# Patient Record
Sex: MALE | Race: OTHER | Employment: PART TIME | URBAN - METROPOLITAN AREA
[De-identification: names, ages, dates, MRNs, and addresses within clinical notes are randomized per-mention and may not be internally consistent; named-entity substitution may affect disease eponyms.]

---

## 2018-02-15 ENCOUNTER — OFFICE VISIT (OUTPATIENT)
Dept: FAMILY MEDICINE CLINIC | Age: 48
End: 2018-02-15

## 2018-02-15 VITALS
BODY MASS INDEX: 25.06 KG/M2 | DIASTOLIC BLOOD PRESSURE: 76 MMHG | TEMPERATURE: 98.2 F | RESPIRATION RATE: 20 BRPM | HEIGHT: 71 IN | SYSTOLIC BLOOD PRESSURE: 102 MMHG | WEIGHT: 179 LBS | HEART RATE: 86 BPM | OXYGEN SATURATION: 96 %

## 2018-02-15 DIAGNOSIS — R09.81 NASAL CONGESTION: ICD-10-CM

## 2018-02-15 DIAGNOSIS — F33.9 RECURRENT MAJOR DEPRESSIVE DISORDER, REMISSION STATUS UNSPECIFIED (HCC): ICD-10-CM

## 2018-02-15 DIAGNOSIS — J06.9 URTI (ACUTE UPPER RESPIRATORY INFECTION): Primary | ICD-10-CM

## 2018-02-15 DIAGNOSIS — R05.9 COUGH: ICD-10-CM

## 2018-02-15 RX ORDER — DIPHENHYDRAMINE HCL 25 MG
25 CAPSULE ORAL DAILY
COMMUNITY
End: 2018-04-12

## 2018-02-15 RX ORDER — BISMUTH SUBSALICYLATE 262 MG
1 TABLET,CHEWABLE ORAL DAILY
COMMUNITY
End: 2018-12-05 | Stop reason: SDUPTHER

## 2018-02-15 RX ORDER — ARIPIPRAZOLE 10 MG/1
TABLET ORAL
Refills: 5 | COMMUNITY
Start: 2018-01-23 | End: 2020-09-10

## 2018-02-15 RX ORDER — TEMAZEPAM 30 MG/1
15 CAPSULE ORAL
Refills: 5 | COMMUNITY
Start: 2017-12-20 | End: 2020-09-10 | Stop reason: SDUPTHER

## 2018-02-15 RX ORDER — GUAIFENESIN 600 MG/1
600 TABLET, EXTENDED RELEASE ORAL 2 TIMES DAILY
Qty: 20 TAB | Refills: 0 | Status: SHIPPED | OUTPATIENT
Start: 2018-02-15 | End: 2018-06-13

## 2018-02-15 RX ORDER — FLUOXETINE HYDROCHLORIDE 20 MG/1
CAPSULE ORAL
Refills: 5 | COMMUNITY
Start: 2018-01-23

## 2018-02-15 NOTE — PROGRESS NOTES
Marita Brochure     Chief Complaint   Patient presents with   [facility] Establish Care    Cold Symptoms     Vitals:    02/15/18 1552   BP: 102/76   Pulse: 86   Resp: 20   Temp: 98.2 °F (36.8 °C)   TempSrc: Oral   SpO2: 96%   Weight: 179 lb (81.2 kg)   Height: 5' 11\" (1.803 m)   PainSc:   0 - No pain         HPI:***    Past Medical History:   Diagnosis Date    Brain anoxic injury ([address]     Depression 02/01/2005    recurrent episode, Major Depression    Psychotic disorder 12/07/2007    Intellectual disability      Past Surgical History:   Procedure Laterality Date    HX TRACHEOSTOMY N/A 1985     Social History   Substance Use Topics    Smoking status: Never Smoker    Smokeless tobacco: Never Used    Alcohol use Yes       History reviewed. No pertinent family history. Review of Systems   Constitutional: Negative for chills, fever, malaise/fatigue and weight loss. HENT: Positive for congestion. Negative for ear discharge, ear pain, hearing loss, nosebleeds, sinus pain and sore throat. Eyes: Negative for blurred vision, double vision and discharge. Respiratory: Positive for cough and sputum production. Negative for hemoptysis, shortness of breath and wheezing. Cardiovascular: Negative for chest pain, palpitations, claudication and leg swelling. Gastrointestinal: Negative for abdominal pain, constipation, diarrhea, nausea and vomiting. Genitourinary: Negative for dysuria, frequency and urgency. Musculoskeletal: Negative for back pain, falls, joint pain, myalgias and neck pain. Skin: Negative for itching and rash. Neurological: Negative for dizziness, tingling, sensory change, speech change, focal weakness, weakness and headaches. Psychiatric/Behavioral: Positive for depression. Negative for hallucinations, substance abuse and suicidal ideas. The patient is nervous/anxious. The patient does not have insomnia. Physical Exam   Constitutional: He is oriented to person, place, and time.  He appears well-developed and well-nourished. No distress. HENT:   Head: Normocephalic and atraumatic. Mouth/Throat: No oropharyngeal exudate. Eyes: Conjunctivae are normal. Pupils are equal, round, and reactive to light. Right eye exhibits no discharge. Left eye exhibits no discharge. No scleral icterus. Neck: Normal range of motion. Neck supple. No thyromegaly present. Cardiovascular: Normal rate, regular rhythm and normal heart sounds. Pulmonary/Chest: Effort normal and breath sounds normal. No respiratory distress. He has no rales. Abdominal: Soft. Bowel sounds are normal. He exhibits no distension and no mass. There is no tenderness. There is no rebound. Musculoskeletal: Normal range of motion. He exhibits no edema, tenderness or deformity. Lymphadenopathy:     He has no cervical adenopathy. Neurological: He is alert and oriented to person, place, and time. No cranial nerve deficit. Coordination normal.   Skin: Skin is warm and dry. No rash noted. He is not diaphoretic. No erythema. Psychiatric: He has a normal mood and affect. Judgment and thought content normal.        Assessment and plan       Current Outpatient Prescriptions   Medication Sig Dispense Refill    temazepam (RESTORIL) 30 mg capsule TK ONE C PO QHS PRN  5    FLUoxetine (PROZAC) 20 mg capsule TK 1 C PO QD  5    ARIPiprazole (ABILIFY) 10 mg tablet TK 1 T PO QAM  5    diphenhydrAMINE (BENADRYL) 25 mg capsule Take 25 mg by mouth daily. Indications: Allergic Conjunctivitis      multivitamin (ONE A DAY) tablet Take 1 Tab by mouth daily. There are no active problems to display for this patient. No results found for this or any previous visit. No results found for any previous visit.        Follow-up Disposition: Not on File

## 2018-02-15 NOTE — PROGRESS NOTES
Shameka Skelton     Chief Complaint   Patient presents with    [facility] Care    Cold Symptoms     Vitals:    02/15/18 1552   BP: 102/76   Pulse: 86   Resp: 20   Temp: 98.2 °F (36.8 °C)   TempSrc: Oral   SpO2: 96%   Weight: 179 lb (81.2 kg)   Height: 5' 11\" (1.803 m)   PainSc:   0 - No pain         HPI: Is coming for the first time to complain about cough and cold for the day, no fever no chills he has some nasal congestion which she is using nasal spray for, no shortness of breath no chest pain no nausea no vomiting no diarrhea no abdominal pain. He does have anxiety and depression and occasionally depressed and anxious according to his caregiver. He did have a  Traumatic  Brain injury over 30 years ago resulted in slurred speech and affect is also his memory and balance  Past Medical History:   Diagnosis Date    [address] an[facility] ([facility]     Depression 02/01/2005    recurrent episode, Major Depression    Psychotic disorder 12/07/2007    Intellectual disability      Past Surgical History:   Procedure Laterality Date    HX TRACHEOSTOMY N/A 1985     Social History   Substance Use Topics    Smoking status: Never Smoker    Smokeless tobacco: Never Used    Alcohol use Yes       History reviewed. No pertinent family history. Review of Systems   Constitutional: Negative for chills, fever, malaise/fatigue and weight loss. HENT: Positive for congestion. Negative for ear discharge, ear pain, hearing loss, nosebleeds, sinus pain and sore throat. Eyes: Negative for blurred vision, double vision and discharge. Respiratory: Positive for cough and sputum production. Negative for shortness of breath and wheezing. Cardiovascular: Negative for chest pain, palpitations, claudication and leg swelling. Gastrointestinal: Negative for abdominal pain, constipation, diarrhea, nausea and vomiting. Genitourinary: Negative for dysuria, frequency and urgency.    Musculoskeletal: Negative for back pain, falls, joint pain, myalgias and neck pain. Skin: Negative for itching and rash. Neurological: Negative for dizziness, tingling, sensory change, speech change, focal weakness, weakness and headaches. Psychiatric/Behavioral: Positive for depression. Negative for suicidal ideas. Physical Exam   Constitutional: He is oriented to person, place, and time. He appears well-developed and well-nourished. No distress. HENT:   Head: Normocephalic and atraumatic. Mouth/Throat: No oropharyngeal exudate. Eyes: Conjunctivae are normal. Pupils are equal, round, and reactive to light. Right eye exhibits no discharge. Left eye exhibits no discharge. No scleral icterus. Neck: Normal range of motion. Neck supple. No thyromegaly present. Cardiovascular: Normal rate, regular rhythm and normal heart sounds. Pulmonary/Chest: Effort normal and breath sounds normal. No respiratory distress. He has no rales. Abdominal: Soft. Bowel sounds are normal. He exhibits no distension and no mass. There is no tenderness. There is no rebound. Musculoskeletal: Normal range of motion. He exhibits no edema, tenderness or deformity. Lymphadenopathy:     He has no cervical adenopathy. Neurological: He is alert and oriented to person, place, and time. No cranial nerve deficit. Coordination normal.   Skin: Skin is warm and dry. No rash noted. He is not diaphoretic. No erythema. Psychiatric: He has a normal mood and affect. Judgment and thought content normal.        Assessment and plan     1. Recurrent major depressive disorder, remission status unspecified ([facility]  Stable continue same medication    2. URTI (acute upper respiratory infection)    - guaiFENesin ER (MUCINEX) 600 mg ER tablet; Take 1 Tab by mouth two (2) times a day. Dispense: 20 Tab; Refill: 0    3. Cough    - guaiFENesin ER (MUCINEX) 600 mg ER tablet; Take 1 Tab by mouth two (2) times a day. Dispense: 20 Tab; Refill: 0    4.  Nasal congestion  Treatment symptomatic, increase hydration    Current Outpatient Prescriptions   Medication Sig Dispense Refill    temazepam (RESTORIL) 30 mg capsule TK ONE C PO QHS PRN  5    FLUoxetine (PROZAC) 20 mg capsule TK 1 C PO QD  5    ARIPiprazole (ABILIFY) 10 mg tablet TK 1 T PO QAM  5    diphenhydrAMINE (BENADRYL) 25 mg capsule Take 25 mg by mouth daily. Indications: Allergic Conjunctivitis      multivitamin (ONE A DAY) tablet Take 1 Tab by mouth daily.  guaiFENesin ER (MUCINEX) 600 mg ER tablet Take 1 Tab by mouth two (2) times a day. 20 Tab 0       There are no active problems to display for this patient. No results found for this or any previous visit. No results found for any previous visit.        Follow-up Disposition: Not on File

## 2018-02-15 NOTE — PROGRESS NOTES
Dandre Hwang is a 49 y.o. male (: 1970) presenting to address:    Chief Complaint   Patient presents with    Establish Care    Cold Symptoms       Vitals:    02/15/18 1552   Weight: 179 lb (81.2 kg)   Height: 5' 11\" (1.803 m)   PainSc:   0 - No pain       Hearing/Vision:   No exam data present    Learning Assessment:   No flowsheet data found. Depression Screening:     PHQ over the last two weeks 2/15/2018   PHQ Not Done Active Diagnosis of Depression or Bipolar Disorder   Little interest or pleasure in doing things Not at all   Feeling down, depressed or hopeless Not at all   Total Score PHQ 2 0     Fall Risk Assessment:   No flowsheet data found. Abuse Screening:   No flowsheet data found. Coordination of Care Questionaire:   1. Have you been to the ER, urgent care clinic since your last visit? Hospitalized since your last visit? No    2. Have you seen or consulted any other health care providers outside of the [address] [place] [address] since your last visit? Include any pap smears or colon screening. [facility]    Advanced Directive:   1. Do you have an Advanced Directive? no  2. Would you like information on Advanced Directives?  no

## 2018-03-13 ENCOUNTER — HOSPITAL ENCOUNTER (OUTPATIENT)
Dept: LAB | Age: 48
Discharge: HOME | End: 2018-03-13
Payer: COMMERCIAL

## 2018-03-13 ENCOUNTER — OFFICE VISIT (OUTPATIENT)
Dept: FAMILY MEDICINE CLINIC | Age: 48
End: 2018-03-13

## 2018-03-13 VITALS
SYSTOLIC BLOOD PRESSURE: 103 MMHG | DIASTOLIC BLOOD PRESSURE: 71 MMHG | TEMPERATURE: 97.7 F | RESPIRATION RATE: 12 BRPM | WEIGHT: 189 LBS | HEIGHT: 71 IN | BODY MASS INDEX: 26.46 KG/M2

## 2018-03-13 DIAGNOSIS — R29.6 FREQUENT FALLS: ICD-10-CM

## 2018-03-13 DIAGNOSIS — R42 DIZZINESS: ICD-10-CM

## 2018-03-13 DIAGNOSIS — R42 DIZZINESS: Primary | ICD-10-CM

## 2018-03-13 DIAGNOSIS — R63.1 POLYDIPSIA: ICD-10-CM

## 2018-03-13 DIAGNOSIS — Z87.820 HISTORY OF TRAUMATIC BRAIN INJURY: ICD-10-CM

## 2018-03-13 LAB
ALBUMIN SERPL-MCNC: 4.1 G/DL (ref 3.4–5)
ALBUMIN/GLOB SERPL: 1.2 {RATIO} (ref 0.8–1.7)
ALP SERPL-CCNC: 52 U/L (ref 45–117)
ALT SERPL-CCNC: 24 U/L (ref 16–61)
ANION GAP SERPL CALC-SCNC: 7 MMOL/L (ref 3–18)
AST SERPL-CCNC: 19 U/L (ref 15–37)
BASOPHILS # BLD: 0 K/UL (ref 0–0.06)
BASOPHILS NFR BLD: 0 % (ref 0–2)
BILIRUB SERPL-MCNC: 0.5 MG/DL (ref 0.2–1)
BUN SERPL-MCNC: 16 MG/DL (ref 7–18)
BUN/CREAT SERPL: 15 (ref 12–20)
CALCIUM SERPL-MCNC: 8.9 MG/DL (ref 8.5–10.1)
CHLORIDE SERPL-SCNC: 105 MMOL/L (ref 100–108)
CO2 SERPL-SCNC: 29 MMOL/L (ref 21–32)
CREAT SERPL-MCNC: 1.06 MG/DL (ref 0.6–1.3)
DIFFERENTIAL METHOD BLD: NORMAL
EOSINOPHIL # BLD: 0.1 K/UL (ref 0–0.4)
EOSINOPHIL NFR BLD: 1 % (ref 0–5)
ERYTHROCYTE [DISTWIDTH] IN BLOOD BY AUTOMATED COUNT: 13 % (ref 11.6–14.5)
GLOBULIN SER CALC-MCNC: 3.4 G/DL (ref 2–4)
GLUCOSE SERPL-MCNC: 87 MG/DL (ref 74–99)
HCT VFR BLD AUTO: 44.5 % (ref 36–48)
HGB BLD-MCNC: 15 G/DL (ref 13–16)
LYMPHOCYTES # BLD: 2 K/UL (ref 0.9–3.6)
LYMPHOCYTES NFR BLD: 35 % (ref 21–52)
MCH RBC QN AUTO: 31.8 PG (ref 24–34)
MCHC RBC AUTO-ENTMCNC: 33.7 G/DL (ref 31–37)
MCV RBC AUTO: 94.5 FL (ref 74–97)
MONOCYTES # BLD: 0.5 K/UL (ref 0.05–1.2)
MONOCYTES NFR BLD: 8 % (ref 3–10)
NEUTS SEG # BLD: 3.2 K/UL (ref 1.8–8)
NEUTS SEG NFR BLD: 56 % (ref 40–73)
PLATELET # BLD AUTO: 253 K/UL (ref 135–420)
PMV BLD AUTO: 9.6 FL (ref 9.2–11.8)
POTASSIUM SERPL-SCNC: 4.3 MMOL/L (ref 3.5–5.5)
PROT SERPL-MCNC: 7.5 G/DL (ref 6.4–8.2)
RBC # BLD AUTO: 4.71 M/UL (ref 4.7–5.5)
SODIUM SERPL-SCNC: 141 MMOL/L (ref 136–145)
TSH SERPL DL<=0.05 MIU/L-ACNC: 3.79 UIU/ML (ref 0.36–3.74)
WBC # BLD AUTO: 5.7 K/UL (ref 4.6–13.2)

## 2018-03-13 PROCEDURE — 80053 COMPREHEN METABOLIC PANEL: CPT | Performed by: LEGAL MEDICINE

## 2018-03-13 PROCEDURE — 36415 COLL VENOUS BLD VENIPUNCTURE: CPT | Performed by: LEGAL MEDICINE

## 2018-03-13 PROCEDURE — 84443 ASSAY THYROID STIM HORMONE: CPT | Performed by: LEGAL MEDICINE

## 2018-03-13 PROCEDURE — 85025 COMPLETE CBC W/AUTO DIFF WBC: CPT | Performed by: LEGAL MEDICINE

## 2018-03-13 RX ORDER — HYDROCORTISONE 1 %
CREAM (GRAM) TOPICAL
COMMUNITY
End: 2018-06-13

## 2018-03-13 RX ORDER — BENZTROPINE MESYLATE 1 MG/1
TABLET ORAL
Refills: 3 | COMMUNITY
Start: 2018-03-07 | End: 2018-04-12

## 2018-03-13 RX ORDER — TETRAHYDROZOLINE HCL 0.05 %
DROPS OPHTHALMIC (EYE)
COMMUNITY
End: 2018-06-13

## 2018-03-13 RX ORDER — IBUPROFEN 200 MG
200 TABLET ORAL
COMMUNITY
End: 2018-06-13

## 2018-03-13 NOTE — PROGRESS NOTES
Juliana Carballo is a [address]  y.o. male (: 1970) presenting to address:    Chief Complaint   Patient presents with    Brain Injury     depth perception and balance off after taking Benztropine    Polyuria       Vitals:    18 1059   BP: 103/71   Resp: 12   Temp: 97.7 °F (36.5 °C)   TempSrc: Oral   Weight: 189 lb (85.7 kg)   Height: 5' 11\" (1.803 m)   PainSc:   0 - No pain       Hearing/Vision:   No exam data present    Learning Assessment:     Learning Assessment 3/13/2018   PRIMARY LEARNER Patient   HIGHEST LEVEL OF EDUCATION - PRIMARY LEARNER  GRADUATED HIGH SCHOOL OR GED   BARRIERS PRIMARY LEARNER COGNITIVE   CO-LEARNER CAREGIVER Yes   PRIMARY LANGUAGE ENGLISH   LEARNER PREFERENCE PRIMARY LISTENING     DEMONSTRATION     READING   ANSWERED BY patient   RELATIONSHIP SELF     Depression Screening:     PHQ over the last two weeks 3/13/2018   PHQ Not Done Active Diagnosis of Depression or Bipolar Disorder   Little interest or pleasure in doing things -   Feeling down, depressed or hopeless -   Total Score PHQ 2 -     Fall Risk Assessment:     Fall Risk Assessment, last 12 mths 3/13/2018   Able to walk? Yes   Fall in past 12 months? Yes   Fall with injury? Yes   Number of falls in past 12 months 3   Fall Risk Score 4     Abuse Screening:     Abuse Screening Questionnaire 3/13/2018   Do you ever feel afraid of your partner? N   Are you in a relationship with someone who physically or mentally threatens you? N   Is it safe for you to go home? Y     Coordination of Care Questionaire:   1. Have you been to the ER, urgent care clinic since your last visit? Hospitalized since your last visit? NO    2. Have you seen or consulted any other health care providers outside of the [address] [place] [address] since your last visit? Include any pap smears or colon screening.  NO

## 2018-03-14 ENCOUNTER — TELEPHONE (OUTPATIENT)
Dept: FAMILY MEDICINE CLINIC | Age: 48
End: 2018-03-14

## 2018-03-14 NOTE — TELEPHONE ENCOUNTER
----- Message from Vickie Brennan MD sent at 3/14/2018 10:06 AM EDT -----  Results are normal ,only minimal elevation of TSH might need to be checked in 6 month

## 2018-03-14 NOTE — LETTER
3/14/2018 4:58 PM 
 
Mr. Meek King [address] [facility] [address] [facility] [id number] Dear Meek King I have reviewed your recent lab tests and have found the results to be within normal ranges. Please call if you have any questions [phone] . Sincerely, Leticia Tobar MD

## 2018-03-22 ENCOUNTER — OFFICE VISIT (OUTPATIENT)
Dept: FAMILY MEDICINE CLINIC | Age: 48
End: 2018-03-22

## 2018-03-22 VITALS
HEIGHT: 71 IN | TEMPERATURE: 98.9 F | SYSTOLIC BLOOD PRESSURE: 101 MMHG | RESPIRATION RATE: 20 BRPM | DIASTOLIC BLOOD PRESSURE: 64 MMHG | OXYGEN SATURATION: 98 % | WEIGHT: 178.8 LBS | HEART RATE: 89 BPM | BODY MASS INDEX: 25.03 KG/M2

## 2018-03-22 DIAGNOSIS — G93.1 ANOXIC BRAIN DAMAGE (HCC): ICD-10-CM

## 2018-03-22 DIAGNOSIS — R26.81 UNSTEADY GAIT: ICD-10-CM

## 2018-03-22 DIAGNOSIS — R42 DIZZINESS: Primary | ICD-10-CM

## 2018-03-22 RX ORDER — MECLIZINE HCL 12.5 MG 12.5 MG/1
12.5 TABLET ORAL
COMMUNITY
Start: 2018-03-17 | End: 2018-08-27 | Stop reason: SDUPTHER

## 2018-03-22 NOTE — PROGRESS NOTES
Tariq Delgado is a 49 y.o. male (: 1970) presenting to address:    Chief Complaint   Patient presents with   [facility] Follow Up     discharged from [place] [address] 3/17 after fall       Vitals:    18 1317   BP: 101/64   Pulse: 89   Resp: 20   Temp: 98.9 °F (37.2 °C)   TempSrc: Oral   SpO2: 98%   Weight: 178 lb 12.8 oz (81.1 kg)   Height: 5' 11\" (1.803 m)   PainSc:   0 - No pain       Hearing/Vision:   No exam data present    Learning Assessment:     Learning Assessment 3/13/2018   PRIMARY LEARNER Patient   HIGHEST LEVEL OF EDUCATION - PRIMARY LEARNER  GRADUATED HIGH SCHOOL OR GED   BARRIERS PRIMARY LEARNER COGNITIVE   CO-LEARNER CAREGIVER Yes   PRIMARY LANGUAGE ENGLISH   LEARNER PREFERENCE PRIMARY LISTENING     DEMONSTRATION     READING   ANSWERED BY patient   RELATIONSHIP SELF     Depression Screening:     PHQ over the last two weeks 3/22/2018   PHQ Not Done -   Little interest or pleasure in doing things Not at all   Feeling down, depressed or hopeless Not at all   Total Score PHQ 2 0     Fall Risk Assessment:     Fall Risk Assessment, last 12 mths 3/22/2018   Able to walk? Yes   Fall in past 12 months? Yes   Fall with injury? Yes   Number of falls in past 12 months 4   Fall Risk Score 5     Abuse Screening:     Abuse Screening Questionnaire 3/22/2018   Do you ever feel afraid of your partner? N   Are you in a relationship with someone who physically or mentally threatens you? N   Is it safe for you to go home? Y     Coordination of Care Questionaire:   1. Have you been to the ER, urgent care clinic since your last visit? Hospitalized since your last visit? YES; 3/1/18 [facility], Vertigo    2. Have you seen or consulted any other health care providers outside of the [address] [place] [address] since your last visit? Include any pap smears or colon screening.  NO

## 2018-03-22 NOTE — PROGRESS NOTES
Lucia Carranza     Chief Complaint   Patient presents with   [facility] Follow Up     discharged from [place] [id number] 3/17 after fall     Vitals:    03/22/18 1317   BP: 101/64   Pulse: 89   Resp: 20   Temp: 98.9 °F (37.2 °C)   TempSrc: Oral   SpO2: 98%   Weight: 178 lb 12.8 oz (81.1 kg)   Height: 5' 11\" (1.803 m)   PainSc:   0 - No pain         HPI: Patient is here to follow-up after admission to the hospital for 1 day for possible dizziness or vertigo he fell at home and then ambulance was called he was transferred to the hospital.  MRI was done and other blood work there is no acute diagnosis was found discharge diagnosis was dizziness and ataxia. Last visit patient was referred to neurology because of same reason that patient has multiple fall he need to be evaluated for possible seizure disorder. Today the patient does not have a dizziness he took meclizine that was prescribed him in the ER this morning, he is not nausea does not have any vomiting no chest pain no no other symptoms. He came today with his caregiver when she was wondering what meclizine treatment with is able to continue it or not ! Patient lives at his home with a caregiver that comes in June today but nowadays because of his falling situation they are doing a 24 hours patient family does not want him to move to skilled nursing facility because he gets very agitated around new environment. Patient is known to have anoxic brain injury. Past Medical History:   Diagnosis Date    [facility] ([facility]     Depression 02/01/2005    recurrent episode, Major Depression    Psychotic disorder 12/07/2007    Intellectual disability     Vertigo 03/17/2018     Past Surgical History:   Procedure Laterality Date    HX TRACHEOSTOMY N/A 1985     Social History   Substance Use Topics    Smoking status: Never Smoker    Smokeless tobacco: Never Used    Alcohol use Yes       No family history on file.     Review of Systems Constitutional: Negative for chills, fever, malaise/fatigue and weight loss. HENT: Negative for congestion, ear discharge, ear pain, hearing loss and nosebleeds. Eyes: Negative for blurred vision, double vision and discharge. Respiratory: Negative for cough, hemoptysis, sputum production and shortness of breath. Cardiovascular: Negative for chest pain, palpitations, claudication and leg swelling. Gastrointestinal: Negative for abdominal pain, constipation, diarrhea, nausea and vomiting. Genitourinary: Negative for dysuria, frequency and urgency. Musculoskeletal: Negative for joint pain and myalgias. Skin: Negative for itching and rash. Neurological: Positive for dizziness. Negative for tingling, sensory change, speech change, focal weakness, weakness and headaches. Psychiatric/Behavioral: Negative for substance abuse and suicidal ideas. Physical Exam   Constitutional: He is oriented to person, place, and time. He appears well-developed and well-nourished. No distress. HENT:   Head: Normocephalic and atraumatic. Mouth/Throat: No oropharyngeal exudate. Eyes: Conjunctivae are normal. Pupils are equal, round, and reactive to light. Right eye exhibits no discharge. Left eye exhibits no discharge. No scleral icterus. Neck: Normal range of motion. Neck supple. No thyromegaly present. Cardiovascular: Normal rate, regular rhythm and normal heart sounds. Pulmonary/Chest: Effort normal and breath sounds normal. No respiratory distress. He has no rales. Abdominal: Soft. Bowel sounds are normal. He exhibits no distension and no mass. There is no tenderness. There is no rebound. Musculoskeletal: Normal range of motion. He exhibits no edema, tenderness or deformity. Lymphadenopathy:     He has no cervical adenopathy. Neurological: He is alert and oriented to person, place, and time. No cranial nerve deficit.    Patient is able to squat and get up able to bend down and when he closes his eyes and structures are he feels slightly dizzy   Skin: Skin is warm and dry. No rash noted. He is not diaphoretic. No erythema. Psychiatric: He has a normal mood and affect. Judgment and thought content normal.        Assessment and plan     1. Dizziness  Continue meclizine only as needed    2. Unsteady gait  Continue physical therapy and    Patient was seen the neurologist on April 11 for evaluation for seizure or any other neurological disorder    3. Anoxic brain damage (HCC)      Current Outpatient Prescriptions   Medication Sig Dispense Refill    meclizine (ANTIVERT) 12.5 mg tablet 12.5 mg.      hydrocortisone (CORTAID) 1 % topical cream Use 1 Application to affected area Twice Daily.  hydrocortisone probutate (PANDEL) 0.1 % topical cream Use 1 Application to affected area Once a Day.  ibuprofen (MOTRIN) 200 mg tablet 200 mg.  tetrahydrozoline (OPTI-CLEAR) 0.05 % ophthalmic solution Instill 1 Drop in affected eye(s) 4 Times Daily.  temazepam (RESTORIL) 30 mg capsule TK ONE C PO QHS PRN  5    FLUoxetine (PROZAC) 20 mg capsule TK 1 C PO QD  5    ARIPiprazole (ABILIFY) 10 mg tablet TK 1 T PO QAM  5    multivitamin (ONE A DAY) tablet Take 1 Tab by mouth daily.  guaiFENesin ER (MUCINEX) 600 mg ER tablet Take 1 Tab by mouth two (2) times a day. 20 Tab 0    benztropine (COGENTIN) 1 mg tablet TK 1 T PO BID  3    diphenhydrAMINE (BENADRYL) 25 mg capsule Take 25 mg by mouth daily. Indications: Allergic Conjunctivitis         There are no active problems to display for this patient.     Results for orders placed or performed during the hospital encounter of 03/13/18   CBC WITH AUTOMATED DIFF   Result Value Ref Range    WBC 5.7 4.6 - 13.2 K/uL    RBC 4.71 4.70 - 5.50 M/uL    HGB 15.0 13.0 - 16.0 g/dL    HCT 44.5 36.0 - 48.0 %    MCV 94.5 74.0 - 97.0 FL    MCH 31.8 24.0 - 34.0 PG    MCHC 33.7 31.0 - 37.0 g/dL    RDW 13.0 11.6 - 14.5 %    PLATELET 970 454 - 437 K/uL    MPV 9.6 9.2 - 11.8 FL    NEUTROPHILS 56 40 - 73 %    LYMPHOCYTES 35 21 - 52 %    MONOCYTES 8 3 - 10 %    EOSINOPHILS 1 0 - 5 %    BASOPHILS 0 0 - 2 %    ABS. NEUTROPHILS 3.2 1.8 - 8.0 K/UL    ABS. LYMPHOCYTES 2.0 0.9 - 3.6 K/UL    ABS. MONOCYTES 0.5 0.05 - 1.2 K/UL    ABS. EOSINOPHILS 0.1 0.0 - 0.4 K/UL    ABS. BASOPHILS 0.0 0.0 - 0.06 K/UL    DF AUTOMATED     METABOLIC PANEL, COMPREHENSIVE   Result Value Ref Range    Sodium 141 136 - 145 mmol/L    Potassium 4.3 3.5 - 5.5 mmol/L    Chloride 105 100 - 108 mmol/L    CO2 29 21 - 32 mmol/L    Anion gap 7 3.0 - 18 mmol/L    Glucose 87 74 - 99 mg/dL    BUN 16 7.0 - 18 MG/DL    Creatinine 1.06 0.6 - 1.3 MG/DL    BUN/Creatinine ratio 15 12 - 20      GFR est AA >60 >60 ml/min/1.73m2    GFR est non-AA >60 >60 ml/min/1.73m2    Calcium 8.9 8.5 - 10.1 MG/DL    Bilirubin, total 0.5 0.2 - 1.0 MG/DL    ALT (SGPT) 24 16 - 61 U/L    AST (SGOT) 19 15 - 37 U/L    Alk. phosphatase 52 45 - 117 U/L    Protein, total 7.5 6.4 - 8.2 g/dL    Albumin 4.1 3.4 - 5.0 g/dL    Globulin 3.4 2.0 - 4.0 g/dL    A-G Ratio 1.2 0.8 - 1.7     TSH 3RD GENERATION   Result Value Ref Range    TSH 3.79 (H) 0.36 - 3.74 uIU/mL     Hospital Outpatient Visit on 03/13/2018   Component Date Value Ref Range Status    WBC 03/13/2018 5.7  4.6 - 13.2 K/uL Final    RBC 03/13/2018 4.71  4.70 - 5.50 M/uL Final    HGB 03/13/2018 15.0  13.0 - 16.0 g/dL Final    HCT 03/13/2018 44.5  36.0 - 48.0 % Final    MCV 03/13/2018 94.5  74.0 - 97.0 FL Final    MCH 03/13/2018 31.8  24.0 - 34.0 PG Final    MCHC 03/13/2018 33.7  31.0 - 37.0 g/dL Final    RDW 03/13/2018 13.0  11.6 - 14.5 % Final    PLATELET 63/31/8972 161  135 - 420 K/uL Final    MPV 03/13/2018 9.6  9.2 - 11.8 FL Final    NEUTROPHILS 03/13/2018 56  40 - 73 % Final    LYMPHOCYTES 03/13/2018 35  21 - 52 % Final    MONOCYTES 03/13/2018 8  3 - 10 % Final    EOSINOPHILS 03/13/2018 1  0 - 5 % Final    BASOPHILS 03/13/2018 0  0 - 2 % Final    ABS.  NEUTROPHILS 03/13/2018 3.2 1.8 - 8.0 K/UL Final    ABS. LYMPHOCYTES 03/13/2018 2.0  0.9 - 3.6 K/UL Final    ABS. MONOCYTES 03/13/2018 0.5  0.05 - 1.2 K/UL Final    ABS. EOSINOPHILS 03/13/2018 0.1  0.0 - 0.4 K/UL Final    ABS. BASOPHILS 03/13/2018 0.0  0.0 - 0.06 K/UL Final    DF 03/13/2018 AUTOMATED    Final    Sodium 03/13/2018 141  136 - 145 mmol/L Final    Potassium 03/13/2018 4.3  3.5 - 5.5 mmol/L Final    Chloride 03/13/2018 105  100 - 108 mmol/L Final    CO2 03/13/2018 29  21 - 32 mmol/L Final    Anion gap 03/13/2018 7  3.0 - 18 mmol/L Final    Glucose 03/13/2018 87  74 - 99 mg/dL Final    BUN 03/13/2018 16  7.0 - 18 MG/DL Final    Creatinine 03/13/2018 1.06  0.6 - 1.3 MG/DL Final    BUN/Creatinine ratio 03/13/2018 15  12 - 20   Final    GFR est AA 03/13/2018 >60  >60 ml/min/1.73m2 Final    GFR est non-AA 03/13/2018 >60  >60 ml/min/1.73m2 Final    Comment: (NOTE)  Estimated GFR is calculated using the Modification of Diet in Renal   Disease (MDRD) Study equation, reported for both  Americans   (GFRAA) and non- Americans (GFRNA), and normalized to 1.73m2   body surface area. The physician must decide which value applies to   the patient. The MDRD study equation should only be used in   individuals age 25 or older. It has not been validated for the   following: pregnant women, patients with serious comorbid conditions,   or on certain medications, or persons with extremes of body size,   muscle mass, or nutritional status.  Calcium 03/13/2018 8.9  8.5 - 10.1 MG/DL Final    Bilirubin, total 03/13/2018 0.5  0.2 - 1.0 MG/DL Final    ALT (SGPT) 03/13/2018 24  16 - 61 U/L Final    AST (SGOT) 03/13/2018 19  15 - 37 U/L Final    Alk.  phosphatase 03/13/2018 52  45 - 117 U/L Final    Protein, total 03/13/2018 7.5  6.4 - 8.2 g/dL Final    Albumin 03/13/2018 4.1  3.4 - 5.0 g/dL Final    Globulin 03/13/2018 3.4  2.0 - 4.0 g/dL Final    A-G Ratio 03/13/2018 1.2  0.8 - 1.7   Final    TSH 03/13/2018 3.79* 0.36 - 3.74 uIU/mL Final          Follow-up Disposition:  Return if symptoms worsen or fail to improve.

## 2018-04-12 ENCOUNTER — OFFICE VISIT (OUTPATIENT)
Dept: FAMILY MEDICINE CLINIC | Age: 48
End: 2018-04-12

## 2018-04-12 VITALS
TEMPERATURE: 96.1 F | WEIGHT: 175.6 LBS | BODY MASS INDEX: 24.58 KG/M2 | HEIGHT: 71 IN | SYSTOLIC BLOOD PRESSURE: 111 MMHG | HEART RATE: 85 BPM | DIASTOLIC BLOOD PRESSURE: 82 MMHG

## 2018-04-12 DIAGNOSIS — G93.1 ANOXIC BRAIN DAMAGE (HCC): ICD-10-CM

## 2018-04-12 DIAGNOSIS — G47.8 SLEEP AROUSAL DISORDER: Primary | ICD-10-CM

## 2018-04-12 DIAGNOSIS — R42 DIZZINESS: ICD-10-CM

## 2018-04-12 DIAGNOSIS — R55 SYNCOPE, UNSPECIFIED SYNCOPE TYPE: ICD-10-CM

## 2018-04-12 NOTE — MR AVS SNAPSHOT
[address] [facility] 
 
 
 [address] [facility] [address] [place] [address] 
[phone] Patient: Linda Michelle MRN: [id number] CAH:[birth year] Visit Information Date & Time Provider Department Dept. Phone Encounter #  
 4/12/2018 11:00 AM Conchita Busby MD [facility] [phone] [id number] Your Appointments 5/29/2018 10:00 AM  
PHYSICAL with Conchita Busby MD  
[facility] Appt Note: possibly a MWV (pt will call once ins is straight) [address] [facility] [address] [place] [address]  
[phone]  
  
   
 [address] [place] [id number] Upcoming Health Maintenance Date Due DTaP/Tdap/Td series (1 - Tdap) 7/5/1990 MEDICARE YEARLY EXAM 3/22/2018 Allergies as of 4/12/2018  Review Complete On: 4/12/2018 By: Dwayne Holley No Known Allergies Current Immunizations  Never Reviewed No immunizations on file. Not reviewed this visit You Were Diagnosed With   
  
 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 
ICD-9-CM: 780.2 Dizziness     ICD-10-CM: M73 ICD-9-CM: 780.4 Anoxic brain damage (HCC)     ICD-10-CM: G93.1 ICD-9-CM: 390. 1 Vitals BP Pulse Temp Height(growth percentile) Weight(growth percentile) BMI  
 111/82 (BP 1 Location: Left arm, BP Patient Position: Sitting) 85 96.1 °F (35.6 °C) (Oral) 5' 11\" (1.803 m) 175 lb 9.6 oz (79.7 kg) 24.49 kg/m2 Smoking Status Never Smoker Vitals History BMI and BSA Data Body Mass Index Body Surface Area  
 24.49 kg/m 2 2 m 2 Your Updated Medication List  
  
   
This list is accurate as of 4/12/18 12:02 PM.  Always use your most recent med list.  
  
  
  
  
 ARIPiprazole 10 mg tablet Commonly known as:  ABILIFY TK 1 T PO QAM  
  
 BENADRYL 25 mg capsule Generic drug:  diphenhydrAMINE Take 25 mg by mouth daily. Indications: Allergic Conjunctivitis  
  
 benztropine 1 mg tablet Commonly known as:  COGENTIN  
TK 1 T PO BID FLUoxetine 20 mg capsule Commonly known as:  PROzac TK 1 C PO QD  
  
 guaiFENesin  mg ER tablet Commonly known as:  Theo & Theo Take 1 Tab by mouth two (2) times a day. hydrocortisone 1 % topical cream  
Commonly known as:  CORTAID Use 1 Application to affected area Twice Daily. hydrocortisone probutate 0.1 % topical cream  
Commonly known as:  PANDEL Use 1 Application to affected area Once a Day. ibuprofen 200 mg tablet Commonly known as:  MOTRIN  
200 mg.  
  
 meclizine 12.5 mg tablet Commonly known as:  ANTIVERT  
12.5 mg.  
  
 multivitamin tablet Commonly known as:  ONE A DAY Take 1 Tab by mouth daily. temazepam 30 mg capsule Commonly known as:  RESTORIL TK ONE C PO QHS PRN  
  
 tetrahydrozoline 0.05 % ophthalmic solution Commonly known as:  OPTI-CLEAR Instill 1 Drop in affected eye(s) 4 Times Daily. We Performed the Following SLEEP MEDICINE REFERRAL [GCF300 Custom] Comments:  
 Having interrupted sleep ,and he wakes up frequently Referral Information Referral ID Referred By Referred To  
  
 [id number] Bam SIMON Not Available Visits Status Start Date End Date 1 New Request 4/12/18 4/12/19 If your referral has a status of pending review or denied, additional information will be sent to support the outcome of this decision. Patient Instructions Home Blood Pressure Test: About This Test 
What is it? A home blood pressure test allows you to keep track of your blood pressure at home. Blood pressure is a measure of the force of blood against the walls of your arteries. Blood pressure readings include two numbers, such as 130/80 (say \"130 over 80\"). The first number is the systolic pressure. The second number is the diastolic pressure. Why is this test done? You may do this test at home to: · Find out if you have high blood pressure. · Track your blood pressure if you have high blood pressure. · Track how well medicine is working to reduce high blood pressure. · Check how lifestyle changes, such as weight loss and exercise, are affecting blood pressure. How can you prepare for the test? 
· Do not use caffeine, tobacco, or medicines known to raise blood pressure (such as nasal decongestant sprays) for at least 30 minutes before taking your blood pressure. · Do not exercise for at least 30 minutes before taking your blood pressure. What happens before the test? 
Take your blood pressure while you feel comfortable and relaxed. Sit quietly with both feet on the floor for at least 5 minutes before the test. 
What happens during the test? 
· Sit with your arm slightly bent and resting on a table so that your upper arm is at the same level as your heart. · Roll up your sleeve or take off your shirt to expose your upper arm. · Wrap the blood pressure cuff around your upper arm so that the lower edge of the cuff is about 1 inch above the bend of your elbow. Proceed with the following steps depending on if you are using an automatic or manual pressure monitor. Automatic blood pressure monitors · Press the on/off button on the automatic monitor and wait until the ready-to-measure \"heart\" symbol appears next to zero in the display window. · Press the start button. The cuff will inflate and deflate by itself. · Your blood pressure numbers will appear on the screen. · Write your numbers in your log book, along with the date and time. Manual blood pressure monitors · Place the earpieces of a stethoscope in your ears, and place the bell of the stethoscope over the artery, just below the cuff. · Close the valve on the rubber inflating bulb. · Squeeze the bulb rapidly with your opposite hand to inflate the cuff until the dial or column of mercury reads about 30 mm Hg higher than your usual systolic pressure. If you do not know your usual pressure, inflate the cuff to 210 mm Hg or until the pulse at your wrist disappears. · Open the pressure valve just slightly by twisting or pressing the valve on the bulb. · As you watch the pressure slowly fall, note the level on the dial at which you first start to hear a pulsing or tapping sound through the stethoscope. This is your systolic blood pressure. · Continue letting the air out slowly. The sounds will become muffled and will finally disappear. Note the pressure when the sounds completely disappear. This is your diastolic blood pressure. Let out all the remaining air. · Write your numbers in your log book, along with the date and time. What else should you know about the test? 
Results for adults ages 25 and older (mm Hg): · Normal (ideal): Systolic 071 or below. Diastolic 79 or below. · Prehypertension: Systolic 085 to 191. Diastolic 80 to 89. · Hypertension: Systolic 247 or above. Diastolic 90 or above. Follow-up care is a key part of your treatment and safety. Be sure to make and go to all appointments, and call your doctor if you are having problems. It's also a good idea to keep a list of the medicines you take. Where can you learn more? Go to http://edilma-hemant.info/. Enter C427 in the search box to learn more about \"Home Blood Pressure Test: About This Test.\" Current as of: September 21, 2016 Content Version: 11.4 © 3443-3540 Healthwise, [employer]. Care instructions adapted under license by [employer] (which disclaims liability or warranty for this information). If you have questions about a medical condition or this instruction, always ask your healthcare professional. [address] any warranty or liability for your use of this information. Elevated Blood Pressure: Care Instructions Your Care Instructions Blood pressure is a measure of how hard the blood pushes against the walls of your arteries. It's normal for blood pressure to go up and down throughout the day. But if it stays up over time, you have high blood pressure. Two numbers tell you your blood pressure. The first number is the systolic pressure. It shows how hard the blood pushes when your heart is pumping. The second number is the diastolic pressure. It shows how hard the blood pushes between heartbeats, when your heart is relaxed and filling with blood. An ideal blood pressure in adults is less than 120/80 (say \"120 over 80\"). High blood pressure is 140/90 or higher. You have high blood pressure if your top number is 140 or higher or your bottom number is 90 or higher, or both. The main test for high blood pressure is simple, fast, and painless. To diagnose high blood pressure, your doctor will test your blood pressure at different times. After testing your blood pressure, your doctor may ask you to test it again when you are home. If you are diagnosed with high blood pressure, you can work with your doctor to make a long-term plan to manage it. Follow-up care is a key part of your treatment and safety. Be sure to make and go to all appointments, and call your doctor if you are having problems. It's also a good idea to know your test results and keep a list of the medicines you take. How can you care for yourself at home? · Do not smoke. Smoking increases your risk for heart attack and stroke. If you need help quitting, talk to your doctor about stop-smoking programs and medicines. These can increase your chances of quitting for good. · Stay at a healthy weight. · Try to limit how much sodium you eat to less than 2,300 milligrams (mg) a day. Your doctor may ask you to try to eat less than 1,500 mg a day. · Be physically active. Get at least 30 minutes of exercise on most days of the week. Walking is a good choice. You also may want to do other activities, such as running, swimming, cycling, or playing tennis or team sports. · Avoid or limit alcohol. Talk to your doctor about whether you can drink any alcohol. · Eat plenty of fruits, vegetables, and low-fat dairy products. Eat less saturated and total fats. · Learn how to check your blood pressure at home. When should you call for help? Call your doctor now or seek immediate medical care if: 
? · Your blood pressure is much higher than normal (such as 180/110 or higher). ? · You think high blood pressure is causing symptoms such as: ¨ Severe headache. ¨ Blurry vision. ? Watch closely for changes in your health, and be sure to contact your doctor if: 
? · You do not get better as expected. Where can you learn more? Go to http://edilma-hemant.info/. Enter V717 in the search box to learn more about \"Elevated Blood Pressure: Care Instructions. \" Current as of: September 21, 2016 Content Version: 11.4 © 7354-8314 PostBeyond. Care instructions adapted under license by [employer] (which disclaims liability or warranty for this information). If you have questions about a medical condition or this instruction, always ask your healthcare professional. [address] any warranty or liability for your use of this information. High Blood Pressure: Care Instructions Your Care Instructions If your blood pressure is usually above 140/90, you have high blood pressure, or hypertension. That means the top number is 140 or higher or the bottom number is 90 or higher, or both. Despite what a lot of people think, high blood pressure usually doesn't cause headaches or make you feel dizzy or lightheaded. It usually has no symptoms. But it does increase your risk for heart attack, stroke, and kidney or eye damage. The higher your blood pressure, the more your risk increases. Your doctor will give you a goal for your blood pressure. Your goal will be based on your health and your age. An example of a goal is to keep your blood pressure below 140/90. Lifestyle changes, such as eating healthy and being active, are always important to help lower blood pressure. You might also take medicine to reach your blood pressure goal. 
Follow-up care is a key part of your treatment and safety. Be sure to make and go to all appointments, and call your doctor if you are having problems. It's also a good idea to know your test results and keep a list of the medicines you take. How can you care for yourself at home? Medical treatment · If you stop taking your medicine, your blood pressure will go back up. You may take one or more types of medicine to lower your blood pressure. Be safe with medicines. Take your medicine exactly as prescribed. Call your doctor if you think you are having a problem with your medicine. · Talk to your doctor before you start taking aspirin every day. Aspirin can help certain people lower their risk of a heart attack or stroke. But taking aspirin isn't right for everyone, because it can cause serious bleeding. · See your doctor regularly. You may need to see the doctor more often at first or until your blood pressure comes down. · If you are taking blood pressure medicine, talk to your doctor before you take decongestants or anti-inflammatory medicine, such as ibuprofen. Some of these medicines can raise blood pressure. · Learn how to check your blood pressure at home. Lifestyle changes · Stay at a healthy weight. This is especially important if you put on weight around the waist. Losing even 10 pounds can help you lower your blood pressure. · If your doctor recommends it, get more exercise. Walking is a good choice. Bit by bit, increase the amount you walk every day. Try for at least 30 minutes on most days of the week. You also may want to swim, bike, or do other activities. · Avoid or limit alcohol. Talk to your doctor about whether you can drink any alcohol. · Try to limit how much sodium you eat to less than 2,300 milligrams (mg) a day. Your doctor may ask you to try to eat less than 1,500 mg a day. · Eat plenty of fruits (such as bananas and oranges), vegetables, legumes, whole grains, and low-fat dairy products. · Lower the amount of saturated fat in your diet. Saturated fat is found in animal products such as milk, cheese, and meat. Limiting these foods may help you lose weight and also lower your risk for heart disease. · Do not smoke. Smoking increases your risk for heart attack and stroke. If you need help quitting, talk to your doctor about stop-smoking programs and medicines. These can increase your chances of quitting for good. When should you call for help? Call 911 anytime you think you may need emergency care. This may mean having symptoms that suggest that your blood pressure is causing a serious heart or blood vessel problem. Your blood pressure may be over 180/110. ? For example, call 911 if: 
? · You have symptoms of a heart attack. These may include: ¨ Chest pain or pressure, or a strange feeling in the chest. 
¨ Sweating. ¨ Shortness of breath. ¨ Nausea or vomiting. ¨ Pain, pressure, or a strange feeling in the back, neck, jaw, or upper belly or in one or both shoulders or arms. ¨ Lightheadedness or sudden weakness. ¨ A fast or irregular heartbeat. ? · You have symptoms of a stroke. These may include: 
¨ Sudden numbness, tingling, weakness, or loss of movement in your face, arm, or leg, especially on only one side of your body. ¨ Sudden vision changes. ¨ Sudden trouble speaking. ¨ Sudden confusion or trouble understanding simple statements. ¨ Sudden problems with walking or balance. ¨ A sudden, severe headache that is different from past headaches. ? · You have severe back or belly pain. ?Do not wait until your blood pressure comes down on its own. Get help right away. ?Call your doctor now or seek immediate care if: 
? · Your blood pressure is much higher than normal (such as 180/110 or higher), but you don't have symptoms. ? · You think high blood pressure is causing symptoms, such as: ¨ Severe headache. ¨ Blurry vision. ? Watch closely for changes in your health, and be sure to contact your doctor if: 
? · Your blood pressure measures 140/90 or higher at least 2 times. That means the top number is 140 or higher or the bottom number is 90 or higher, or both. ? · You think you may be having side effects from your blood pressure medicine. ? · Your blood pressure is usually normal, but it goes above normal at least 2 times. Where can you learn more? Go to http://edilma-hemant.info/. Enter W823 in the search box to learn more about \"High Blood Pressure: Care Instructions. \" Current as of: September 21, 2016 Content Version: 11.4 © 9882-2968 Healthwise, Incorporated. Care instructions adapted under license by [employer] (which disclaims liability or warranty for this information). If you have questions about a medical condition or this instruction, always ask your healthcare professional. [address] any warranty or liability for your use of this information. Introducing [address] Nicholas Barajas introduces [employer] patient portal. Now you can access parts of your medical record, email your doctor's office, and request medication refills online. 1. In your internet browser, go to https://"iReTron[employer] Inc". EdPuzzle/"[employer]Re[employer]ron[employer] Inc" 2. Click on the First Time User? Click Here link in the Sign In box. You will see the New Member Sign Up page. 3. Enter your [employer] Access Code exactly as it appears below. You will not need to use this code after youve completed the sign-up process. If you do not sign up before the expiration date, you must request a new code. · [employer] Access Code: [email or web address] Expires: 5/16/2018  5:22 PM 
 
4. Enter the last four digits of your Social Security Number (xxxx) and Date of Birth (mm/dd/yyyy) as indicated and click Submit. You will be taken to the next sign-up page. 5. Create a [employer] ID. This will be your [employer] login ID and cannot be changed, so think of one that is secure and easy to remember. 6. Create a [employer] password. You can change your password at any time. 7. Enter your Password Reset Question and Answer. This can be used at a later time if you forget your password. 8. Enter your e-mail address. You will receive e-mail notification when new information is available in [address] 9. Click Sign Up. You can now view and download portions of your medical record. 10. Click the Download Summary menu link to download a portable copy of your medical information. If you have questions, please visit the Frequently Asked Questions section of the ThirdSpaceLearningt website. Remember, [employer] is NOT to be used for urgent needs. For medical emergencies, dial 911. Now available from your iPhone and Android! Please provide this summary of care documentation to your next provider. Your primary care clinician is listed as Fay An. If you have any questions after today's visit, please call [phone].

## 2018-04-12 NOTE — PATIENT INSTRUCTIONS
Home Blood Pressure Test: About This Test  What is it? A home blood pressure test allows you to keep track of your blood pressure at home. Blood pressure is a measure of the force of blood against the walls of your arteries. Blood pressure readings include two numbers, such as 130/80 (say \"130 over 80\"). The first number is the systolic pressure. The second number is the diastolic pressure. Why is this test done? You may do this test at home to:  · Find out if you have high blood pressure. · Track your blood pressure if you have high blood pressure. · Track how well medicine is working to reduce high blood pressure. · Check how lifestyle changes, such as weight loss and exercise, are affecting blood pressure. How can you prepare for the test?  · Do not use caffeine, tobacco, or medicines known to raise blood pressure (such as nasal decongestant sprays) for at least 30 minutes before taking your blood pressure. · Do not exercise for at least 30 minutes before taking your blood pressure. What happens before the test?  Take your blood pressure while you feel comfortable and relaxed. Sit quietly with both feet on the floor for at least 5 minutes before the test.  What happens during the test?  · Sit with your arm slightly bent and resting on a table so that your upper arm is at the same level as your heart. · Roll up your sleeve or take off your shirt to expose your upper arm. · Wrap the blood pressure cuff around your upper arm so that the lower edge of the cuff is about 1 inch above the bend of your elbow. Proceed with the following steps depending on if you are using an automatic or manual pressure monitor. Automatic blood pressure monitors  · Press the on/off button on the automatic monitor and wait until the ready-to-measure \"heart\" symbol appears next to zero in the display window. · Press the start button. The cuff will inflate and deflate by itself.   · Your blood pressure numbers will appear on the screen. · Write your numbers in your log book, along with the date and time. Manual blood pressure monitors  · Place the earpieces of a stethoscope in your ears, and place the bell of the stethoscope over the artery, just below the cuff. · Close the valve on the rubber inflating bulb. · Squeeze the bulb rapidly with your opposite hand to inflate the cuff until the dial or column of mercury reads about 30 mm Hg higher than your usual systolic pressure. If you do not know your usual pressure, inflate the cuff to 210 mm Hg or until the pulse at your wrist disappears. · Open the pressure valve just slightly by twisting or pressing the valve on the bulb. · As you watch the pressure slowly fall, note the level on the dial at which you first start to hear a pulsing or tapping sound through the stethoscope. This is your systolic blood pressure. · Continue letting the air out slowly. The sounds will become muffled and will finally disappear. Note the pressure when the sounds completely disappear. This is your diastolic blood pressure. Let out all the remaining air. · Write your numbers in your log book, along with the date and time. What else should you know about the test?  Results for adults ages 25 and older (mm Hg):  · Normal (ideal): Systolic 209 or below. Diastolic 79 or below. · Prehypertension: Systolic 366 to 336. Diastolic 80 to 89. · Hypertension: Systolic 065 or above. Diastolic 90 or above. Follow-up care is a key part of your treatment and safety. Be sure to make and go to all appointments, and call your doctor if you are having problems. It's also a good idea to keep a list of the medicines you take. Where can you learn more? Go to http://edilma-hemant.info/. Enter C427 in the search box to learn more about \"Home Blood Pressure Test: About This Test.\"  Current as of: September 21, 2016  Content Version: 11.4  © 8048-1109 [employer].  Care instructions adapted under license by [employer] (which disclaims liability or warranty for this information). If you have questions about a medical condition or this instruction, always ask your healthcare professional. [address] any warranty or liability for your use of this information. Elevated Blood Pressure: Care Instructions  Your Care Instructions    Blood pressure is a measure of how hard the blood pushes against the walls of your arteries. It's normal for blood pressure to go up and down throughout the day. But if it stays up over time, you have high blood pressure. Two numbers tell you your blood pressure. The first number is the systolic pressure. It shows how hard the blood pushes when your heart is pumping. The second number is the diastolic pressure. It shows how hard the blood pushes between heartbeats, when your heart is relaxed and filling with blood. An ideal blood pressure in adults is less than 120/80 (say \"120 over 80\"). High blood pressure is 140/90 or higher. You have high blood pressure if your top number is 140 or higher or your bottom number is 90 or higher, or both. The main test for high blood pressure is simple, fast, and painless. To diagnose high blood pressure, your doctor will test your blood pressure at different times. After testing your blood pressure, your doctor may ask you to test it again when you are home. If you are diagnosed with high blood pressure, you can work with your doctor to make a long-term plan to manage it. Follow-up care is a key part of your treatment and safety. Be sure to make and go to all appointments, and call your doctor if you are having problems. It's also a good idea to know your test results and keep a list of the medicines you take. How can you care for yourself at home? · Do not smoke. Smoking increases your risk for heart attack and stroke.  If you need help quitting, talk to your doctor about stop-smoking programs and medicines. These can increase your chances of quitting for good. · Stay at a healthy weight. · Try to limit how much sodium you eat to less than 2,300 milligrams (mg) a day. Your doctor may ask you to try to eat less than 1,500 mg a day. · Be physically active. Get at least 30 minutes of exercise on most days of the week. Walking is a good choice. You also may want to do other activities, such as running, swimming, cycling, or playing tennis or team sports. · Avoid or limit alcohol. Talk to your doctor about whether you can drink any alcohol. · Eat plenty of fruits, vegetables, and low-fat dairy products. Eat less saturated and total fats. · Learn how to check your blood pressure at home. When should you call for help? Call your doctor now or seek immediate medical care if:  ? · Your blood pressure is much higher than normal (such as 180/110 or higher). ? · You think high blood pressure is causing symptoms such as:  ¨ Severe headache. ¨ Blurry vision. ? Watch closely for changes in your health, and be sure to contact your doctor if:  ? · You do not get better as expected. Where can you learn more? Go to http://edilma-hemant.info/. Enter P867 in the search box to learn more about \"Elevated Blood Pressure: Care Instructions. \"  Current as of: September 21, 2016  Content Version: 11.4  © 6407-3928 "[employer] Care instructions adapted under license by [employer] (which disclaims liability or warranty for this information). If you have questions about a medical condition or this instruction, always ask your healthcare professional. [place] [address] any warranty or liability for your use of this information. High Blood Pressure: Care Instructions  Your Care Instructions    If your blood pressure is usually above 140/90, you have high blood pressure, or hypertension.  That means the top number is 140 or higher or the bottom number is 90 or higher, or both. Despite what a lot of people think, high blood pressure usually doesn't cause headaches or make you feel dizzy or lightheaded. It usually has no symptoms. But it does increase your risk for heart attack, stroke, and kidney or eye damage. The higher your blood pressure, the more your risk increases. Your doctor will give you a goal for your blood pressure. Your goal will be based on your health and your age. An example of a goal is to keep your blood pressure below 140/90. Lifestyle changes, such as eating healthy and being active, are always important to help lower blood pressure. You might also take medicine to reach your blood pressure goal.  Follow-up care is a key part of your treatment and safety. Be sure to make and go to all appointments, and call your doctor if you are having problems. It's also a good idea to know your test results and keep a list of the medicines you take. How can you care for yourself at home? Medical treatment  · If you stop taking your medicine, your blood pressure will go back up. You may take one or more types of medicine to lower your blood pressure. Be safe with medicines. Take your medicine exactly as prescribed. Call your doctor if you think you are having a problem with your medicine. · Talk to your doctor before you start taking aspirin every day. Aspirin can help certain people lower their risk of a heart attack or stroke. But taking aspirin isn't right for everyone, because it can cause serious bleeding. · See your doctor regularly. You may need to see the doctor more often at first or until your blood pressure comes down. · If you are taking blood pressure medicine, talk to your doctor before you take decongestants or anti-inflammatory medicine, such as ibuprofen. Some of these medicines can raise blood pressure. · Learn how to check your blood pressure at home. Lifestyle changes  · Stay at a healthy weight.  This is especially important if you put on weight around the waist. Losing even 10 pounds can help you lower your blood pressure. · If your doctor recommends it, get more exercise. Walking is a good choice. Bit by bit, increase the amount you walk every day. Try for at least 30 minutes on most days of the week. You also may want to swim, bike, or do other activities. · Avoid or limit alcohol. Talk to your doctor about whether you can drink any alcohol. · Try to limit how much sodium you eat to less than 2,300 milligrams (mg) a day. Your doctor may ask you to try to eat less than 1,500 mg a day. · Eat plenty of fruits (such as bananas and oranges), vegetables, legumes, whole grains, and low-fat dairy products. · Lower the amount of saturated fat in your diet. Saturated fat is found in animal products such as milk, cheese, and meat. Limiting these foods may help you lose weight and also lower your risk for heart disease. · Do not smoke. Smoking increases your risk for heart attack and stroke. If you need help quitting, talk to your doctor about stop-smoking programs and medicines. These can increase your chances of quitting for good. When should you call for help? Call 911 anytime you think you may need emergency care. This may mean having symptoms that suggest that your blood pressure is causing a serious heart or blood vessel problem. Your blood pressure may be over 180/110. ? For example, call 911 if:  ? · You have symptoms of a heart attack. These may include:  ¨ Chest pain or pressure, or a strange feeling in the chest.  ¨ Sweating. ¨ Shortness of breath. ¨ Nausea or vomiting. ¨ Pain, pressure, or a strange feeling in the back, neck, jaw, or upper belly or in one or both shoulders or arms. ¨ Lightheadedness or sudden weakness. ¨ A fast or irregular heartbeat. ? · You have symptoms of a stroke.  These may include:  ¨ Sudden numbness, tingling, weakness, or loss of movement in your face, arm, or leg, especially on only one side of your body. ¨ Sudden vision changes. ¨ Sudden trouble speaking. ¨ Sudden confusion or trouble understanding simple statements. ¨ Sudden problems with walking or balance. ¨ A sudden, severe headache that is different from past headaches. ? · You have severe back or belly pain. ?Do not wait until your blood pressure comes down on its own. Get help right away. ?Call your doctor now or seek immediate care if:  ? · Your blood pressure is much higher than normal (such as 180/110 or higher), but you don't have symptoms. ? · You think high blood pressure is causing symptoms, such as:  ¨ Severe headache. ¨ Blurry vision. ? Watch closely for changes in your health, and be sure to contact your doctor if:  ? · Your blood pressure measures 140/90 or higher at least 2 times. That means the top number is 140 or higher or the bottom number is 90 or higher, or both. ? · You think you may be having side effects from your blood pressure medicine. ? · Your blood pressure is usually normal, but it goes above normal at least 2 times. Where can you learn more? Go to http://edilma-hemant.info/. Enter G855 in the search box to learn more about \"High Blood Pressure: Care Instructions. \"  Current as of: September 21, 2016  Content Version: 11.4  © 0663-6784 [employer]. Care instructions adapted under license by [employer] (which disclaims liability or warranty for this information). If you have questions about a medical condition or this instruction, always ask your healthcare professional. [place] [address] any warranty or liability for your use of this information.

## 2018-04-12 NOTE — PROGRESS NOTES
Roxana Saldana is a 49 y.o. male (: 1970) presenting to address:    Chief Complaint   Patient presents with    Hypertension     bp protocol    Sleep Study       Vitals:    18 1122   BP: 111/82   Pulse: 85   Temp: 96.1 °F (35.6 °C)   TempSrc: Oral   SpO2: (!) 85%   Weight: 175 lb 9.6 oz (79.7 kg)   Height: 5' 11\" (1.803 m)   PainSc:   0 - No pain       Hearing/Vision:   No exam data present    Learning Assessment:     Learning Assessment 3/13/2018   PRIMARY LEARNER Patient   HIGHEST LEVEL OF EDUCATION - PRIMARY LEARNER  GRADUATED HIGH SCHOOL OR GED   BARRIERS PRIMARY LEARNER COGNITIVE   CO-LEARNER CAREGIVER Yes   PRIMARY LANGUAGE ENGLISH   LEARNER PREFERENCE PRIMARY LISTENING     DEMONSTRATION     READING   ANSWERED BY patient   RELATIONSHIP SELF     Depression Screening:     PHQ over the last two weeks 2018   PHQ Not Done Active Diagnosis of Depression or Bipolar Disorder   Little interest or pleasure in doing things -   Feeling down, depressed or hopeless -   Total Score PHQ 2 -     Fall Risk Assessment:     Fall Risk Assessment, last 12 mths 3/22/2018   Able to walk? Yes   Fall in past 12 months? Yes   Fall with injury? Yes   Number of falls in past 12 months 4   Fall Risk Score 5     Abuse Screening:     Abuse Screening Questionnaire 3/22/2018   Do you ever feel afraid of your partner? N   Are you in a relationship with someone who physically or mentally threatens you? N   Is it safe for you to go home? Y     Coordination of Care Questionaire:   1. Have you been to the ER, urgent care clinic since your last visit? Hospitalized since your last visit? NO    2. Have you seen or consulted any other health care providers outside of the [address] [place] [address] since your last visit? Include any pap smears or colon screening. NO    Advanced Directive:   1. Do you have an Advanced Directive? YES    2. Would you like information on Advanced Directives?  NO

## 2018-04-12 NOTE — PROGRESS NOTES
Deepak Rodrigez     Chief Complaint   Patient presents with    Hypertension     bp protocol    Sleep Study     Vitals:    04/12/18 1122   BP: 111/82   Pulse: 85   Temp: 96.1 °F (35.6 °C)   TempSrc: Oral   Weight: 175 lb 9.6 oz (79.7 kg)   Height: 5' 11\" (1.803 m)   PainSc:   0 - No pain         HPI: Mr. Edilberto Uribe he is here for follow-up. He is a 49years old [de-identified] man with a history of anoxic brain injury when he was a teenager, he is functioning well he works at [employer] place as a , he lives in his own home with nursing help, he can do his non-and instrumental ADL. Today he has no complaint just to follow-up the nurse following him up noticed that variation his blood pressure. His systolic blood pressure can range anywhere from     And his diastolic blood pressure is ranging from 64 to  89. I discussed with the staff keep monitoring the blood pressure if the diastolic was above 554 or systolic of 744 or if the patient have any symptoms of dizziness shortness of breath chest pain blurred vision or headaches he need to go to ER. I do not feel at this motion point the patient should be on treatment for blood pressure medication. Past Medical History:   Diagnosis Date    Brain anoxic injury ([facility] [address]     Depression 02/01/2005    recurrent episode, Major Depression    Psychotic disorder 12/07/2007    Intellectual disability     Vertigo 03/17/2018     Past Surgical History:   Procedure Laterality Date    HX TRACHEOSTOMY N/A 1985     Social History   Substance Use Topics    Smoking status: Never Smoker    Smokeless tobacco: Never Used    Alcohol use Yes       History reviewed. No pertinent family history. Review of Systems   Constitutional: Negative for chills, fever, malaise/fatigue and weight loss. HENT: Negative for congestion, ear discharge, ear pain, hearing loss, nosebleeds, sinus pain and sore throat.     Eyes: Negative for blurred vision, double vision and discharge. Respiratory: Negative for cough, sputum production, shortness of breath and wheezing. Cardiovascular: Negative for chest pain, palpitations, claudication and leg swelling. Gastrointestinal: Negative for abdominal pain, constipation, diarrhea, nausea and vomiting. Genitourinary: Negative for dysuria, frequency and urgency. Musculoskeletal: Negative for back pain, joint pain, myalgias and neck pain. Skin: Negative for itching and rash. Neurological: Negative for dizziness, tingling, sensory change, speech change, focal weakness, weakness and headaches. Psychiatric/Behavioral: Negative for depression, hallucinations and suicidal ideas. The patient is nervous/anxious and has insomnia. Physical Exam   Constitutional: He is oriented to person, place, and time. He appears well-developed and well-nourished. No distress. HENT:   Head: Normocephalic and atraumatic. Mouth/Throat: No oropharyngeal exudate. Eyes: Conjunctivae are normal. Pupils are equal, round, and reactive to light. Right eye exhibits no discharge. Left eye exhibits no discharge. No scleral icterus. Neck: Normal range of motion. Neck supple. No thyromegaly present. Cardiovascular: Normal rate, regular rhythm and normal heart sounds. Pulmonary/Chest: Effort normal and breath sounds normal. No respiratory distress. He has no rales. Abdominal: Soft. Bowel sounds are normal. He exhibits no distension and no mass. There is no tenderness. There is no rebound. Musculoskeletal: Normal range of motion. He exhibits no edema, tenderness or deformity. Lymphadenopathy:     He has no cervical adenopathy. Neurological: He is alert and oriented to person, place, and time. No cranial nerve deficit. Coordination abnormal.   Skin: Skin is warm and dry. No rash noted. He is not diaphoretic. No erythema. Psychiatric: He has a normal mood and affect. Judgment and thought content normal.        Assessment and plan     1. Sleep arousal disorder  The nursing staff noted that he wakes up a lot from sleep and when he wakes up does not look like a normal waking up from sleep and is multiple times at night and he is being tested for sleep apnea, he would be referred to sleep medicine.  - SLEEP MEDICINE REFERRAL    2. Syncope, unspecified syncope type  He is undergoing workup by the neurologist  3. Dizziness  Dizziness is much better improved and he takes meclizine as needed    4. Anoxic brain damage [facility])  Patient condition has been stable    Current Outpatient Prescriptions   Medication Sig Dispense Refill    meclizine (ANTIVERT) 12.5 mg tablet 12.5 mg.      hydrocortisone (CORTAID) 1 % topical cream Use 1 Application to affected area Twice Daily.  hydrocortisone probutate (PANDEL) 0.1 % topical cream Use 1 Application to affected area Once a Day.  ibuprofen (MOTRIN) 200 mg tablet 200 mg.  tetrahydrozoline (OPTI-CLEAR) 0.05 % ophthalmic solution Instill 1 Drop in affected eye(s) 4 Times Daily.  temazepam (RESTORIL) 30 mg capsule TK ONE C PO QHS PRN  5    FLUoxetine (PROZAC) 20 mg capsule TK 1 C PO QD  5    ARIPiprazole (ABILIFY) 10 mg tablet TK 1 T PO QAM  5    multivitamin (ONE A DAY) tablet Take 1 Tab by mouth daily.  guaiFENesin ER (MUCINEX) 600 mg ER tablet Take 1 Tab by mouth two (2) times a day. 20 Tab 0       There are no active problems to display for this patient.     Results for orders placed or performed during the hospital encounter of 03/13/18   CBC WITH AUTOMATED DIFF   Result Value Ref Range    WBC 5.7 4.6 - 13.2 K/uL    RBC 4.71 4.70 - 5.50 M/uL    HGB 15.0 13.0 - 16.0 g/dL    HCT 44.5 36.0 - 48.0 %    MCV 94.5 74.0 - 97.0 FL    MCH 31.8 24.0 - 34.0 PG    MCHC 33.7 31.0 - 37.0 g/dL    RDW 13.0 11.6 - 14.5 %    PLATELET 687 050 - 648 K/uL    MPV 9.6 9.2 - 11.8 FL    NEUTROPHILS 56 40 - 73 %    LYMPHOCYTES 35 21 - 52 %    MONOCYTES 8 3 - 10 %    EOSINOPHILS 1 0 - 5 %    BASOPHILS 0 0 - 2 % ABS. NEUTROPHILS 3.2 1.8 - 8.0 K/UL    ABS. LYMPHOCYTES 2.0 0.9 - 3.6 K/UL    ABS. MONOCYTES 0.5 0.05 - 1.2 K/UL    ABS. EOSINOPHILS 0.1 0.0 - 0.4 K/UL    ABS. BASOPHILS 0.0 0.0 - 0.06 K/UL    DF AUTOMATED     METABOLIC PANEL, COMPREHENSIVE   Result Value Ref Range    Sodium 141 136 - 145 mmol/L    Potassium 4.3 3.5 - 5.5 mmol/L    Chloride 105 100 - 108 mmol/L    CO2 29 21 - 32 mmol/L    Anion gap 7 3.0 - 18 mmol/L    Glucose 87 74 - 99 mg/dL    BUN 16 7.0 - 18 MG/DL    Creatinine 1.06 0.6 - 1.3 MG/DL    BUN/Creatinine ratio 15 12 - 20      GFR est AA >60 >60 ml/min/1.73m2    GFR est non-AA >60 >60 ml/min/1.73m2    Calcium 8.9 8.5 - 10.1 MG/DL    Bilirubin, total 0.5 0.2 - 1.0 MG/DL    ALT (SGPT) 24 16 - 61 U/L    AST (SGOT) 19 15 - 37 U/L    Alk. phosphatase 52 45 - 117 U/L    Protein, total 7.5 6.4 - 8.2 g/dL    Albumin 4.1 3.4 - 5.0 g/dL    Globulin 3.4 2.0 - 4.0 g/dL    A-G Ratio 1.2 0.8 - 1.7     TSH 3RD GENERATION   Result Value Ref Range    TSH 3.79 (H) 0.36 - 3.74 uIU/mL     Hospital Outpatient Visit on 03/13/2018   Component Date Value Ref Range Status    WBC 03/13/2018 5.7  4.6 - 13.2 K/uL Final    RBC 03/13/2018 4.71  4.70 - 5.50 M/uL Final    HGB 03/13/2018 15.0  13.0 - 16.0 g/dL Final    HCT 03/13/2018 44.5  36.0 - 48.0 % Final    MCV 03/13/2018 94.5  74.0 - 97.0 FL Final    MCH 03/13/2018 31.8  24.0 - 34.0 PG Final    MCHC 03/13/2018 33.7  31.0 - 37.0 g/dL Final    RDW 03/13/2018 13.0  11.6 - 14.5 % Final    PLATELET 99/49/2501 644  135 - 420 K/uL Final    MPV 03/13/2018 9.6  9.2 - 11.8 FL Final    NEUTROPHILS 03/13/2018 56  40 - 73 % Final    LYMPHOCYTES 03/13/2018 35  21 - 52 % Final    MONOCYTES 03/13/2018 8  3 - 10 % Final    EOSINOPHILS 03/13/2018 1  0 - 5 % Final    BASOPHILS 03/13/2018 0  0 - 2 % Final    ABS. NEUTROPHILS 03/13/2018 3.2  1.8 - 8.0 K/UL Final    ABS. LYMPHOCYTES 03/13/2018 2.0  0.9 - 3.6 K/UL Final    ABS.  MONOCYTES 03/13/2018 0.5  0.05 - 1.2 K/UL Final    ABS. EOSINOPHILS 03/13/2018 0.1  0.0 - 0.4 K/UL Final    ABS. BASOPHILS 03/13/2018 0.0  0.0 - 0.06 K/UL Final    DF 03/13/2018 AUTOMATED    Final    Sodium 03/13/2018 141  136 - 145 mmol/L Final    Potassium 03/13/2018 4.3  3.5 - 5.5 mmol/L Final    Chloride 03/13/2018 105  100 - 108 mmol/L Final    CO2 03/13/2018 29  21 - 32 mmol/L Final    Anion gap 03/13/2018 7  3.0 - 18 mmol/L Final    Glucose 03/13/2018 87  74 - 99 mg/dL Final    BUN 03/13/2018 16  7.0 - 18 MG/DL Final    Creatinine 03/13/2018 1.06  0.6 - 1.3 MG/DL Final    BUN/Creatinine ratio 03/13/2018 15  12 - 20   Final    GFR est AA 03/13/2018 >60  >60 ml/min/1.73m2 Final    GFR est non-AA 03/13/2018 >60  >60 ml/min/1.73m2 Final    Comment: (NOTE)  Estimated GFR is calculated using the Modification of Diet in Renal   Disease (MDRD) Study equation, reported for both  Americans   (GFRAA) and non- Americans (GFRNA), and normalized to 1.73m2   body surface area. The physician must decide which value applies to   the patient. The MDRD study equation should only be used in   individuals age 25 or older. It has not been validated for the   following: pregnant women, patients with serious comorbid conditions,   or on certain medications, or persons with extremes of body size,   muscle mass, or nutritional status.  Calcium 03/13/2018 8.9  8.5 - 10.1 MG/DL Final    Bilirubin, total 03/13/2018 0.5  0.2 - 1.0 MG/DL Final    ALT (SGPT) 03/13/2018 24  16 - 61 U/L Final    AST (SGOT) 03/13/2018 19  15 - 37 U/L Final    Alk. phosphatase 03/13/2018 52  45 - 117 U/L Final    Protein, total 03/13/2018 7.5  6.4 - 8.2 g/dL Final    Albumin 03/13/2018 4.1  3.4 - 5.0 g/dL Final    Globulin 03/13/2018 3.4  2.0 - 4.0 g/dL Final    A-G Ratio 03/13/2018 1.2  0.8 - 1.7   Final    TSH 03/13/2018 3.79* 0.36 - 3.74 uIU/mL Final          Follow-up Disposition:  Return in about 2 days (around 4/14/2018).

## 2018-04-18 ENCOUNTER — TELEPHONE (OUTPATIENT)
Dept: FAMILY MEDICINE CLINIC | Age: 48
End: 2018-04-18

## 2018-04-18 NOTE — TELEPHONE ENCOUNTER
Received a call from Marycruz Nice 1936 Mrs. Jean Cole [phone] following up on the status on 2 home health orders that were faxed last week. Please advise.

## 2018-04-26 ENCOUNTER — TELEPHONE (OUTPATIENT)
Dept: FAMILY MEDICINE CLINIC | Age: 48
End: 2018-04-26

## 2018-04-26 NOTE — TELEPHONE ENCOUNTER
Mrs. Lauren Biggs has been called with non answer.  [facility] in regard to patient

## 2018-04-26 NOTE — TELEPHONE ENCOUNTER
Ms Darryl Rios called back today asking for Gabrielle because she received a call but was unclear what it was in regards to. Cristiano Matias was unavailable and relayed this to ms. Andrews, began to read through encounter about orders when she cut me off and stated \" I already have my signed orders so I dont know why she is calling me so when she figures it out she can call me back, okay. \"   and hung up.

## 2018-05-10 ENCOUNTER — HOSPITAL ENCOUNTER (OUTPATIENT)
Dept: PHYSICAL THERAPY | Age: 48
Discharge: HOME | End: 2018-05-10
Payer: MEDICARE

## 2018-05-10 PROCEDURE — 97112 NEUROMUSCULAR REEDUCATION: CPT

## 2018-05-10 PROCEDURE — 97161 PT EVAL LOW COMPLEX 20 MIN: CPT

## 2018-05-10 PROCEDURE — G8978 MOBILITY CURRENT STATUS: HCPCS

## 2018-05-10 PROCEDURE — G8979 MOBILITY GOAL STATUS: HCPCS

## 2018-05-10 NOTE — PROGRESS NOTES
PHYSICAL THERAPY - DAILY TREATMENT NOTE    Patient Name: Adrián Nice        Date: 5/10/2018  : 1970   yes Patient  Verified  Visit #:   1   of   8  Insurance: Payor: Branden Oelrichs / Plan: [employer] / Product Type: HMO /      In time: 1110 Out time: 1150   Total Treatment Time: 40     Medicare Time Tracking (below)   Total Timed Codes (min):  40 1:1 Treatment Time:  40     TREATMENT AREA =  Mobility poor [Z74.09]    SUBJECTIVE  Pain Level (on 0 to 10 scale):  0  / 10   Medication Changes/New allergies or changes in medical history, any new surgeries or procedures?    no  If yes, update Summary List   Subjective Functional Status/Changes:  []  No changes reported     See POC          OBJECTIVE    See exam on chart for details on objective findings        10 min Neuromuscular Re-ed: See flow sheet   Rationale:    improve coordination, improve balance and increase proprioception to improve the patients ability to perform functional mobility/ADLs and attain goals. min Patient Education:  yes  Reviewed HEP   []  Progressed/Changed HEP based on: Other Objective/Functional Measures:    See POC     Post Treatment Pain Level (on 0 to 10) scale:   0 / 10     ASSESSMENT  Assessment/Changes in Function:     See POC     []  See Progress Note/Recertification   Patient will continue to benefit from skilled PT services to modify and progress therapeutic interventions, address functional mobility deficits, analyze and cue movement patterns and address imbalance/dizziness to attain remaining goals. Progress toward goals / Updated goals:    See POC     PLAN  []  Upgrade activities as tolerated yes Continue plan of care   []  Discharge due to :    []  Other:      Therapist: Arpita Herbert, PT    Date: 5/10/2018 Time: 7:49 AM     Future Appointments  Date Time Provider Jem Ruiz   5/10/2018 11:00 AM Chantal Herbert, [facility]   2018 10:00 AM Viki SIMON Renny Tay, [address]   6/13/2018 10:30 AM Pily Peralta  [facility]

## 2018-05-10 NOTE — PROGRESS NOTES
[address] [place] [address], [address], [address] [facility] - Phone: [phone]  Fax: [phone] OF CARE / [address]  Patient Name: Gwen Medrano : 1970   Medical   Diagnosis: Mobility poor [Z74.09] Treatment Diagnosis: Unsteadiness on feet [R26.81]   Onset Date: 3/2018     Referral Source: Anup Contreras* Start of Care [facility]): 5/10/2018   Prior Hospitalization: See medical history Provider #: [id number]   Prior Level of Function: Lives in group home d/t cognitive deficits. Independent with ambulation without AD. Comorbidities: Depression, TBI (age 12)   Medications: Verified on Patient Summary List   The Plan of Care and following information is based on the information from the initial evaluation.   ===========================================================================================  Assessment / key information:  Pt is a 49y.o. year old male with subjective complaints of acute decline in standing balance/unsteadiness with gait. Pt's staff member is present and provides history due to pt with baseline cognitive deficits related to TBI as child. Pt's staff member states he has had acute decline in balance which led to a fall in 2018. Pt was unable to stand from floor with staff assistance and was taken to ER where he was observation x 48 hours. (-) EEG, MRI. Pt was started on Meclizine and there was a ? Of vertigo. Pt has had HH PT/OT x 3 weeks and presents today for progression of care. Current functional limitations: standing, walking. FOTO score= 89/100 indicating 11* impairment to functional activities.   Today's evaulation is significant for   1) Gait impairment: widened KAREN, increased DF at heel strike b/l.      2) Standing balance impairment, STATIC: Romberg EO/EC floor 60/5\", foam 20/1\"; pt reporting fear/dizziness/also appears to be limited due to cognitive impairments limiting true assessment of balance tolerance (decreased command following). DYNAMIC: as per DGI= 13/24 (though score decrease also related to cognitive difficulty processing instructions); Youngblood Balance Assessment= 45 (fall risk). 3) Additional findings: intact light touch BUE/LE. LE strength grossly 5/5. Mega-Hallpike (-). Oculo-motor exam: (+) convergence at 14 cm; unable to accurately assess saccades/VOR due to decreased command following. FNF and smooth pursuit WNL       These findings are supportive for diagnosis of unsteadiness on feet with impaired use of vestibulo-sensory and somatosensory balance systems. Pt will be a good candidate for skilled PT to address these impairments and promote return to normal ADLs and functional mobility for improved quality of life.    ===========================================================================================  Eval Complexity: History MEDIUM  Complexity : 1-2 comorbidities / personal factors will impact the outcome/ POC ;  Examination  MEDIUM Complexity : 3 Standardized tests and measures addressing body structure, function, activity limitation and / or participation in recreation ; Presentation MEDIUM Complexity : Evolving with changing characteristics ;   Decision Making LOW Complexity : FOTO score of ; Overall Complexity LOW   Problem List: impaired gait/ balance, decrease ADL/ functional abilitiies, decrease flexibility/ joint mobility and decrease transfer abilities   Treatment Plan may include any combination of the following: Therapeutic exercise, Therapeutic activities, Neuromuscular re-education, Physical agent/modality, Gait/balance training, Manual therapy, Patient education, Self Care training and Functional mobility training  Patient / Family readiness to learn indicated by: asking questions, trying to perform skills and interest  Persons(s) to be included in education: patient (P)  Barriers to Learning/Limitations: yes;  cognitive  Measures taken:    Patient Goal (s): \"improve balance, shifting of weight, not walking on heels\"   Patient self reported health status: good  Rehabilitation Potential: good   Short Term Goals: To be accomplished in  2-3  weeks:  1. Pt will be educated in appropriate HEP to improve LE strength/stability for amb, standing, and transfers. 2. Pt will maintain SLS bilaterally >/= 10\" for increased safety with ambulation.  Long Term Goals: To be accomplished in  4-6  weeks:  1. Pt will improve Youngblood balance score to >/= 49 to demo a significant improvement in decreasing risk for fall. 2. Pt will achieve >/= +4 GROC in order to promote increased activity tolerance. 3. Pt will maintain Romberg, EC on floor >/= 10 seconds for reduced fall risk with ambulation. Frequency / Duration:   Patient to be seen  2  times per week for 4-6  weeks:  Patient / Caregiver education and instruction: activity modification and exercises  G-Codes (GP): Mobility [id number] Current  CJ= 20-39%   Goal  CI= 1-19%. The severity rating is based on the Other Youngblood Balance Assessment  Therapist Signature: Ignacio Ceron. Andrew, PT Date: 1/64/3045   Certification Period: 05/10/18 to 8/8/2018 Time: 7:49 AM   ===========================================================================================  I certify that the above Physical Therapy Services are being furnished while the patient is under my care. I agree with the treatment plan and certify that this therapy is necessary. Physician Signature:        Date:       Time:     Please sign and return to [facility], [facility]. or you may fax the signed copy to [phone]. Thank you.

## 2018-05-15 ENCOUNTER — HOSPITAL ENCOUNTER (OUTPATIENT)
Dept: PHYSICAL THERAPY | Age: 48
Discharge: HOME | End: 2018-05-15
Payer: MEDICARE

## 2018-05-15 PROCEDURE — 97112 NEUROMUSCULAR REEDUCATION: CPT

## 2018-05-15 PROCEDURE — 97110 THERAPEUTIC EXERCISES: CPT

## 2018-05-15 NOTE — PROGRESS NOTES
PHYSICAL THERAPY - DAILY TREATMENT NOTE    Patient Name: Gwen Medrano        Date: 5/15/2018  : 1970   yes Patient  Verified  Visit #:   2   of   8  Insurance: Payor: Yun Pierre / Plan: [place] PART [place] & B / Product Type: Medicare /      In time: 1438 Out time: 1100   Total Treatment Time: 30     Medicare Time Tracking (below)   Total Timed Codes (min):  30 1:1 Treatment Time:  30     TREATMENT AREA =  Unsteadiness on feet [R26.81]    SUBJECTIVE  Pain Level (on 0 to 10 scale):  0  / 10   Medication Changes/New allergies or changes in medical history, any new surgeries or procedures?    no  If yes, update Summary List   Subjective Functional Status/Changes:  []  No changes reported     \"Im fine\". OBJECTIVE    15 min Therapeutic Exercise:  [x]  See flow sheet   Rationale:      increase ROM, increase strength, improve coordination and improve balance to improve the patients ability to perform functional ADL's     15 min Neuromuscular Re-ed: SEE FLOW SHEET   Rationale:    improve coordination and improve balance to improve the patients ability to perform functional ADL's     min Patient Education:  yes  Reviewed HEP   []  Progressed/Changed HEP based on: Other Objective/Functional Measures:    Initiated therex today per flow sheet, requiring vc and demo 100% of the time for proper form and technique. Required verbal and tactile cuing, responded best to tactile cuing and mirroring today, well as out loud counting     Difficulty with eyes closed today- so held due to high levels of apprehension with perfoming     Post Treatment Pain Level (on 0 to 10) scale:   0  / 10     ASSESSMENT  Assessment/Changes in Function:     Will continue to progress therex within current POC as patient is able.        []  See Progress Note/Recertification   Patient will continue to benefit from skilled PT services to modify and progress therapeutic interventions, address functional mobility deficits, address ROM deficits, address strength deficits, analyze and address soft tissue restrictions, analyze and cue movement patterns, analyze and modify body mechanics/ergonomics and assess and modify postural abnormalities to attain remaining goals. Progress toward goals / Updated goals:    First visit since IE     PLAN  []  Upgrade activities as tolerated yes Continue plan of care   []  Discharge due to :    []  Other:      Therapist: Darion Fox PT, DPT    Date: 5/15/2018 Time: 9:45 AM     Future Appointments  Date Time Provider Jem Ruiz   5/15/2018 10:30 AM Ana Maria Velasquez, PT [facility]   5/22/2018 10:30 AM Ana Maria Velasquez PT [facility]   5/24/2018 10:30 AM Ana Maria Velasquez, PT [address] [place] [address]   5/29/2018 10:00 AM [address] [place] [address],  [facility]   5/29/2018 2:00 PM Ana Maria Velasquez PT [facility]   5/31/2018 9:30 AM Ana Maria Velasquez PT [facility]   6/5/2018 1:00 PM Chantal Cornelius, PT [facility]   6/7/2018 12:00 PM Chantal Moreau Aas [facility]   6/13/2018 10:30 AM Viki Heller MD [address] [place] [address]

## 2018-05-22 ENCOUNTER — HOSPITAL ENCOUNTER (OUTPATIENT)
Dept: PHYSICAL THERAPY | Age: 48
Discharge: HOME | End: 2018-05-22
Payer: MEDICARE

## 2018-05-22 PROCEDURE — 97112 NEUROMUSCULAR REEDUCATION: CPT

## 2018-05-22 PROCEDURE — 97110 THERAPEUTIC EXERCISES: CPT

## 2018-05-22 NOTE — PROGRESS NOTES
PHYSICAL THERAPY - DAILY TREATMENT NOTE    Patient Name: Oseas Gill        Date: 2018  : 1970   yes Patient  Verified  Visit #:   3   of   8  Insurance: Payor: Tariq Borden / Plan: [place] MED[place] & B / Product Type: Medicare /      In time: 7713 Out time: 1100   Total Treatment Time: 30     Medicare Time Tracking (below)   Total Timed Codes (min):  30 1:1 Treatment Time: 30     TREATMENT AREA =  Unsteadiness on feet [R26.81]    SUBJECTIVE  Pain Level (on 0 to 10 scale): 0 / 10   Medication Changes/New allergies or changes in medical history, any new surgeries or procedures?    no  If yes, update Summary List   Subjective Functional Status/Changes:  []  No changes reported     Pt's aid denies any changes since last session. Pt denies any pain at this time. OBJECTIVE    15 min Therapeutic Exercise:  [x]  See flow sheet   Rationale:      increase ROM, increase strength, improve coordination and improve balance to improve the patients ability to perform functional ADL's     15 min Neuromuscular Re-ed: SEE FLOW SHEET   Rationale:    improve coordination and improve balance to improve the patients ability to perform functional ADL's     min Patient Education:  yes  Reviewed HEP   []  Progressed/Changed HEP based on: Other Objective/Functional Measures:    See therex per flow sheet  Pt denies any sharp pains or red flags following tx    Added sidesteps in //bars- demonstrate moderate L toe out even with constant verbal and tactile cuing for toe forward, able to perform without UE support    Progressed hurdles to outside the //bars with CGA. Pt able to perform with ample cuing for stepping over hurdles instead of circumducting the hip. Difficulty following commands even with tactile cuing and use of mirror demo. Continue to hold on EC exercises on foam due to increase pt anxiety and repotted dizziness with EC.         Post Treatment Pain Level (on 0 to 10) scale:   0  / 10 ASSESSMENT  Assessment/Changes in Function:     Will continue to progress therex within current POC as patient is able. []  See Progress Note/Recertification   Patient will continue to benefit from skilled PT services to modify and progress therapeutic interventions, address functional mobility deficits, address ROM deficits, address strength deficits, analyze and address soft tissue restrictions, analyze and cue movement patterns, analyze and modify body mechanics/ergonomics and assess and modify postural abnormalities to attain remaining goals. Progress toward goals / Updated goals:    Slow progress towards goals. PLAN  []  Upgrade activities as tolerated yes Continue plan of care   []  Discharge due to :    []  Other:      Therapist: Luis Alberto Lerma, PT, DPT    Date: 5/22/2018 Time: 9:45 AM     Future Appointments  Date Time Provider Jem Ruiz   5/22/2018 10:30 AM Pauline Velasquez PT [facility]   5/24/2018 10:30 AM Pauline Velasquez PT [facility]   5/29/2018 10:00 AM [facility]   5/31/2018 9:30 AM Pauline Velasquez PT [facility]   6/5/2018 1:00 PM Chantal Hicks, PT [facility]   6/7/2018 12:00 PM Chantal Iverson [facility]   6/13/2018 10:30 AM Viki Antonio  N [facility]

## 2018-05-24 ENCOUNTER — HOSPITAL ENCOUNTER (OUTPATIENT)
Dept: PHYSICAL THERAPY | Age: 48
Discharge: HOME | End: 2018-05-24
Payer: MEDICARE

## 2018-05-24 PROCEDURE — 97110 THERAPEUTIC EXERCISES: CPT

## 2018-05-24 PROCEDURE — 97112 NEUROMUSCULAR REEDUCATION: CPT

## 2018-05-24 NOTE — PROGRESS NOTES
PHYSICAL THERAPY - DAILY TREATMENT NOTE    Patient Name: Lauryn Freeman        Date: 2018  : 1970   yes Patient  Verified  Visit #:   3   of   8  Insurance: Payor: Vincent Font / Plan: [place] MEDICARE PART A & B / Product Type: Medicare /      In time: 7867 Out time: 1100   Total Treatment Time: 30     Medicare Time Tracking (below)   Total Timed Codes (min):  30 1:1 Treatment Time: 30     TREATMENT AREA =  Unsteadiness on feet [R26.81]    SUBJECTIVE  Pain Level (on 0 to 10 scale): 0 / 10   Medication Changes/New allergies or changes in medical history, any new surgeries or procedures?    no  If yes, update Summary List   Subjective Functional Status/Changes:  []  No changes reported     Pt's aid denies any changes since last session. Pt denies any pain at this time. OBJECTIVE    15 min Therapeutic Exercise:  [x]  See flow sheet   Rationale:      increase ROM, increase strength, improve coordination and improve balance to improve the patients ability to perform functional ADL's     15 min Neuromuscular Re-ed: SEE FLOW SHEET   Rationale:    improve coordination and improve balance to improve the patients ability to perform functional ADL's     min Patient Education:  yes  Reviewed HEP   []  Progressed/Changed HEP based on: Other Objective/Functional Measures:    See therex per flow sheet  Pt denies any sharp pains or red flags following tx    Pt's aid reports pt's main difficulty at this time is walking with head turns, as well as walking while carrying objects. Added amb in the clinic while holding onto ball- pt demonstrated slow joel and decreased step length with frequent stops. Pt also tends to perform all balance with high guard position and demonstrated increased anxiety with tandem standing.       Post Treatment Pain Level (on 0 to 10) scale:   0  / 10     ASSESSMENT  Assessment/Changes in Function:     Will continue to progress therex within current POC as patient is able.         []  See Progress Note/Recertification   Patient will continue to benefit from skilled PT services to modify and progress therapeutic interventions, address functional mobility deficits, address ROM deficits, address strength deficits, analyze and address soft tissue restrictions, analyze and cue movement patterns, analyze and modify body mechanics/ergonomics and assess and modify postural abnormalities to attain remaining goals. Progress toward goals / Updated goals:    Slow progress towards goals.       PLAN  []  Upgrade activities as tolerated yes Continue plan of care   []  Discharge due to :    []  Other:      Therapist: Vicki Lemus PT, DPT    Date: 5/24/2018 Time: [id number]     Future Appointments  Date Time Provider Jem Ruiz   5/29/2018 10:00 AM Louise William  N [facility]   5/31/2018 9:30 AM Neo Velasquez, PT [facility]   6/5/2018 11:30 AM Neo Velasquez PT [facility]   6/7/2018 11:30 AM Neo Velasquez PT [facility]   6/13/2018 10:30 AM Viki Harman  N [facility]

## 2018-05-29 ENCOUNTER — OFFICE VISIT (OUTPATIENT)
Dept: FAMILY MEDICINE CLINIC | Age: 48
End: 2018-05-29

## 2018-05-29 ENCOUNTER — HOSPITAL ENCOUNTER (OUTPATIENT)
Dept: LAB | Age: 48
Discharge: HOME | End: 2018-05-29
Payer: MEDICARE

## 2018-05-29 ENCOUNTER — APPOINTMENT (OUTPATIENT)
Dept: PHYSICAL THERAPY | Age: 48
End: 2018-05-29
Payer: MEDICARE

## 2018-05-29 VITALS
WEIGHT: 177 LBS | HEIGHT: 71 IN | RESPIRATION RATE: 22 BRPM | SYSTOLIC BLOOD PRESSURE: 124 MMHG | TEMPERATURE: 97.2 F | BODY MASS INDEX: 24.78 KG/M2 | DIASTOLIC BLOOD PRESSURE: 73 MMHG | HEART RATE: 88 BPM | OXYGEN SATURATION: 96 %

## 2018-05-29 DIAGNOSIS — Z00.00 MEDICARE ANNUAL WELLNESS VISIT, INITIAL: Primary | ICD-10-CM

## 2018-05-29 DIAGNOSIS — E78.2 MIXED HYPERLIPIDEMIA: ICD-10-CM

## 2018-05-29 DIAGNOSIS — H61.23 BILATERAL IMPACTED CERUMEN: ICD-10-CM

## 2018-05-29 LAB
CHOLEST SERPL-MCNC: 162 MG/DL
HDLC SERPL-MCNC: 41 MG/DL (ref 40–60)
HDLC SERPL: 4 {RATIO} (ref 0–5)
LDLC SERPL CALC-MCNC: 96.6 MG/DL (ref 0–100)
LIPID PROFILE,FLP: NORMAL
TRIGL SERPL-MCNC: 122 MG/DL (ref ?–150)
VLDLC SERPL CALC-MCNC: 24.4 MG/DL

## 2018-05-29 PROCEDURE — 80061 LIPID PANEL: CPT | Performed by: LEGAL MEDICINE

## 2018-05-29 PROCEDURE — 36415 COLL VENOUS BLD VENIPUNCTURE: CPT | Performed by: LEGAL MEDICINE

## 2018-05-29 NOTE — MR AVS SNAPSHOT
[facility] 
 
 
 [address] [facility] [address] [place] [address] 
[phone] Patient: Jayda Tucker MRN: [id number] GOC:0/9/9852 Visit Information Date & Time Provider Department Dept. Phone Encounter #  
 5/29/2018 10:00 AM Michael Rouse MD [facility] [phone] [id number] Follow-up Instructions Return as needed. Your Appointments 6/13/2018 10:30 AM  
Office Visit with Michael Rouse MD  
[facility]) Appt Note: 2 month f/u from 4/12/18  
 [facility] [place] [address]  
[phone]  
  
   
 [facility] [address] [place], [place] [id number] Upcoming Health Maintenance Date Due DTaP/Tdap/Td series (1 - Tdap) 7/5/1990 MEDICARE YEARLY EXAM 3/22/2018 Influenza Age 5 to Adult 8/1/2018 Allergies as of 5/29/2018  Review Complete On: 5/10/2018 By: Alon Eric. MARY Morales No Known Allergies Current Immunizations  Never Reviewed No immunizations on file. Not reviewed this visit You Were Diagnosed With   
  
 Codes Comments Medicare annual wellness visit, initial    -  Primary ICD-10-CM: Z00.00 ICD-9-CM: V70.0 Bilateral impacted cerumen     ICD-10-CM: H61.23 
ICD-9-CM: 380.4 Mixed hyperlipidemia     ICD-10-CM: E78.2 ICD-9-CM: 272.2 Vitals BP Pulse Temp Resp Height(growth percentile) Weight(growth percentile) 124/73 (BP 1 Location: Left arm, BP Patient Position: Sitting) 88 97.2 °F (36.2 °C) (Oral) 22 5' 11\" (1.803 m) 177 lb (80.3 kg) SpO2 BMI Smoking Status 96% 24.69 kg/m2 Never Smoker BMI and BSA Data Body Mass Index Body Surface Area  
 24.69 kg/m 2 2.01 m 2 Your Updated Medication List  
  
   
This list is accurate as of 5/29/18 10:39 AM.  Always use your most recent med list.  
  
  
  
  
 ARIPiprazole 10 mg tablet Commonly known as:  ABILIFY TK 1 T PO QAM  
  
 FLUoxetine 20 mg capsule Commonly known as:  PROzac TK 1 C PO QD  
  
 guaiFENesin  mg ER tablet Commonly known as:  Theo & Theo Take 1 Tab by mouth two (2) times a day. hydrocortisone 1 % topical cream  
Commonly known as:  CORTAID Use 1 Application to affected area Twice Daily. ibuprofen 200 mg tablet Commonly known as:  MOTRIN  
200 mg.  
  
 meclizine 12.5 mg tablet Commonly known as:  ANTIVERT  
12.5 mg.  
  
 multivitamin tablet Commonly known as:  ONE A DAY Take 1 Tab by mouth daily. temazepam 30 mg capsule Commonly known as:  RESTORIL TK ONE C PO QHS PRN  
  
 tetrahydrozoline 0.05 % ophthalmic solution Commonly known as:  OPTI-CLEAR Instill 1 Drop in affected eye(s) 4 Times Daily. We Performed the Following REMOVAL IMPACTED CERUMEN IRRIGATION/LVG UNILAT [id number] CPT(R)] Follow-up Instructions Return as needed. To-Do List   
 05/31/2018 9:30 AM  
  Appointment with Ryanne Alvares PT at [facility] [address] ([phone])  
  
 06/05/2018 11:30 AM  
  Appointment with Ryanne Alvares PT at [facility] ([phone])  
  
 06/07/2018 11:30 AM  
  Appointment with Ryanne Alvares PT at [address] [place] [address] ([phone]) Introducing [facility]! [facility] introduces [employer] patient portal. Now you can access parts of your medical record, email your doctor's office, and request medication refills online. 1. In your internet browser, go to [email or web address] 2. Click on the First Time User? Click Here link in the Sign In box. You will see the New Member Sign Up page. 3. Enter your [employer] Access Code exactly as it appears below. You will not need to use this code after youve completed the sign-up process. If you do not sign up before the expiration date, you must request a new code. · [employer] Access Code: [email or web address] Expires: 8/20/2018 10:18 AM 
 
4. Enter the last four digits of your Social Security Number (xxxx) and Date of Birth (mm/dd/yyyy) as indicated and click Submit. You will be taken to the next sign-up page. 5. Create a [employer] ID. This will be your [employer] login ID and cannot be changed, so think of one that is secure and easy to remember. 6. Create a [employer] password. You can change your password at any time. 7. Enter your Password Reset Question and Answer. This can be used at a later time if you forget your password. 8. Enter your e-mail address. You will receive e-mail notification when new information is available in [address] 9. Click Sign Up. You can now view and download portions of your medical record. 10. Click the Download Summary menu link to download a portable copy of your medical information. If you have questions, please visit the Frequently Asked Questions section of the [employer] website. Remember, [employer] is NOT to be used for urgent needs. For medical emergencies, dial 911. Now available from your iPhone and Android! Please provide this summary of care documentation to your next provider. Your primary care clinician is listed as Alon Quiroga. If you have any questions after today's visit, please call [phone].

## 2018-05-29 NOTE — PROGRESS NOTES
(AWV) The Initial Medicare Annual Wellness Exam PROGRESS NOTE    This is an Initial Medicare Annual Wellness Exam (AWV) (Performed 12 months after IPPE or effective date of Medicare Part B enrollment, Once in a lifetime)    I have reviewed the patient's medical history in detail and updated the computerized patient record. Re Fierro is a 49 y.o.  male and presents for an annual wellness exam       There is no problem list on file for this patient. There are no active problems to display for this patient. Current Outpatient Prescriptions   Medication Sig Dispense Refill    meclizine (ANTIVERT) 12.5 mg tablet 12.5 mg.      hydrocortisone (CORTAID) 1 % topical cream Use 1 Application to affected area Twice Daily.  ibuprofen (MOTRIN) 200 mg tablet 200 mg.  tetrahydrozoline (OPTI-CLEAR) 0.05 % ophthalmic solution Instill 1 Drop in affected eye(s) 4 Times Daily.  temazepam (RESTORIL) 30 mg capsule TK ONE C PO QHS PRN  5    FLUoxetine (PROZAC) 20 mg capsule TK 1 C PO QD  5    ARIPiprazole (ABILIFY) 10 mg tablet TK 1 T PO QAM  5    multivitamin (ONE A DAY) tablet Take 1 Tab by mouth daily.  guaiFENesin ER (MUCINEX) 600 mg ER tablet Take 1 Tab by mouth two (2) times a day. 20 Tab 0     No Known Allergies  Past Medical History:   Diagnosis Date    Brain anoxic injury ([address]     Depression 02/01/2005    recurrent episode, Major Depression    Psychotic disorder 12/07/2007    Intellectual disability     Vertigo 03/17/2018     Past Surgical History:   Procedure Laterality Date    HX TRACHEOSTOMY N/A 1985     No family history on file.   Social History   Substance Use Topics    Smoking status: Never Smoker    Smokeless tobacco: Never Used    Alcohol use Yes       ROS   History obtained from mother and guardian and the patient  General ROS: negative  Psychological ROS: negative  Ophthalmic ROS: negative  ENT ROS: positive for - hearing change  Respiratory ROS: no cough, shortness of breath, or wheezing  Cardiovascular ROS: no chest pain or dyspnea on exertion  Gastrointestinal ROS: no abdominal pain, change in bowel habits, or black or bloody stools  Genito-Urinary ROS: no dysuria, trouble voiding, or hematuria  Musculoskeletal ROS: negative  Neurological ROS: no TIA or stroke symptoms  Dermatological ROS: negative    All other systems reviewed and are negative. History     Past Medical History:   Diagnosis Date    [facility]     Depression 02/01/2005    recurrent episode, Major Depression    Psychotic disorder 12/07/2007    Intellectual disability     Vertigo 03/17/2018      Past Surgical History:   Procedure Laterality Date    HX TRACHEOSTOMY N/A 1985     Current Outpatient Prescriptions   Medication Sig Dispense Refill    meclizine (ANTIVERT) 12.5 mg tablet 12.5 mg.      hydrocortisone (CORTAID) 1 % topical cream Use 1 Application to affected area Twice Daily.  ibuprofen (MOTRIN) 200 mg tablet 200 mg.  tetrahydrozoline (OPTI-CLEAR) 0.05 % ophthalmic solution Instill 1 Drop in affected eye(s) 4 Times Daily.  temazepam (RESTORIL) 30 mg capsule TK ONE C PO QHS PRN  5    FLUoxetine (PROZAC) 20 mg capsule TK 1 C PO QD  5    ARIPiprazole (ABILIFY) 10 mg tablet TK 1 T PO QAM  5    multivitamin (ONE A DAY) tablet Take 1 Tab by mouth daily.  guaiFENesin ER (MUCINEX) 600 mg ER tablet Take 1 Tab by mouth two (2) times a day. 20 Tab 0     No Known Allergies  No family history on file. Social History   Substance Use Topics    Smoking status: Never Smoker    Smokeless tobacco: Never Used    Alcohol use Yes     There is no problem list on file for this patient.       Health Maintenance History  Immunizations reviewed,  Colonoscopy:in 2 years ,   Chest CT never smoked   Eye exam: Normal         Depression Risk Factor Screening:      Patient Health Questionnaire (PHQ-2)   Over the last 2 weeks, how often have you been bothered by any of the following problems? · Little interest or pleasure in doing things? · Not at all. [0]  · Feeling down, depressed, or hopeless? · Not at all. [0]    Total Score: 0/6  PHQ-2 Assessment Scoring:   A score of 2 or more requires further screening with the PHQ-9    Alcohol Risk Factor Screening:     Women: On any occasion during the past 3 months, have you had more than 3 drinks containing alcohol? Do you average more than 7 drinks per week? Men: On any occasion during the past 3 months, have you had more than 4 drinks containing alcohol? Do you average more than 14 drinks per week? Functional Ability and Level of Safety:     Hearing Loss    Hearing is good. Whisper Test done with abnormal results. Hearing loss secondary to cerumen impaction. Activities of Daily Living   Partial assistance. Requires assistance with: no ADLs    Fall Risk   History of fall(s) in the past 3 months (25 pts)    Abuse Screen   Patient is not abused  None    Examination   Physical Examination  Vitals:    05/29/18 0939   BP: 124/73   Pulse: 88   Resp: 22   Temp: 97.2 °F (36.2 °C)   TempSrc: Oral   SpO2: 96%   Weight: 177 lb (80.3 kg)   Height: 5' 11\" (1.803 m)   PainSc:   0 - No pain      Body mass index is 24.69 kg/(m^2). Evaluation of Cognitive Function:  Mood/affect:     Appearance:normal   Family member/caregiver input he is stable no acute changes  No behaviors changes.     alert, well appearing, and in no distress    Patient Care Team:  Topher Freedman MD as PCP - General (Internal Medicine)    End-of-life planning  Advanced Directive in the case than an injury or illness causes the patient to be unable to make health care decisions    Health Care Directive or Living Will: NO was given to care giver to give to his sister who is the power of      Advice/Referrals/Counselling/Plan:   Education and counseling provided:  Are appropriate based on today's review and evaluation  Healthy diet and appropriate exercise were discussed with the patient on the caregiver      ICD-10-CM ICD-9-CM    1. Medicare annual wellness visit, initial Z00.00 V70.0    2. Bilateral impacted cerumen H61.23 380.4    3. Mixed hyperlipidemia E78.2 272.2      Encounter Diagnoses   Name Primary?  Medicare annual wellness visit, initial Yes    Bilateral impacted cerumen     Mixed hyperlipidemia    . Brief written plan, checklist    I have discussed the diagnosis with the patient and the intended plan as seen in the above orders. The patient has received an after-visit summary and questions were answered concerning future plans. I have discussed medication side effects and warnings with the patient as well. I have reviewed the plan of care with the patient, accepted their input and they are in agreement with the treatment goals. Follow-up Disposition:  Return if symptoms worsen or fail to improve.      ____________________________________________________________    Problem Assessment    for treatment of   Chief Complaint   Patient presents with    Sleep Problem     follow up    Complete Physical    Annual Wellness Visit         SUBJECTIVE    Dyslipidemia  Patient presents for evaluation of lipids. Compliance with treatment thus far has been was not on any treatment . A repeat fasting lipid profile was not done. The patient does not use medications that may worsen dyslipidemias (corticosteroids, progestins, anabolic steroids, diuretics, beta-blockers, amiodarone, cyclosporine, olanzapine). The patient exercises three times a week. The patient is not known to have coexisting coronary artery disease.            General appearance - alert, well appearing, and in no distress  Mental status - alert, oriented to person, place, and time  Eyes - pupils equal and reactive, extraocular eye movements intact  Ears - bilateral TM's and external ear canals normal, cerumen removed  Nose - normal and patent, no erythema, discharge or polyps  Mouth - mucous membranes moist, pharynx normal without lesions  Neck - supple, no significant adenopathy  Chest - clear to auscultation, no wheezes, rales or rhonchi, symmetric air entry  Heart - normal rate, regular rhythm, normal S1, S2, no murmurs, rubs, clicks or gallops  Abdomen - soft, nontender, nondistended, no masses or organomegaly  Back exam - full range of motion, no tenderness, palpable spasm or pain on motion  Musculoskeletal - no joint tenderness, deformity or swelling  Extremities - peripheral pulses normal, no pedal edema, no clubbing or cyanosis    LABS     TESTS      Assessment/Plan:      History of elevated blood pressure. The nurse provided a chart of his blood pressure follow-up and it has been systolic running in the average [id number] and diastolic running between 70 and 60. Was no dizziness or shortness of breath or headache    Patient does have he did have a history of a fall he had been seen by neurologist and he is undergoing a sleep study next month. Hyperlipidemia - control uncertain    Diagnoses and all orders for this visit:    1. Medicare annual wellness visit, initial    2. Bilateral impacted cerumen  Bilateral ear lavage done  3.  Mixed hyperlipidemia  Patient was not on any medication we will check his lipid profile today        Lab review: labs are reviewed, up to date and normal

## 2018-05-29 NOTE — PROGRESS NOTES
Kathrine Rojo is a 49 y.o. male (: 1970) presenting to address:    Chief Complaint   Patient presents with    Sleep Problem     follow up    Complete Physical    Annual Wellness Visit       Vitals:    18 0939   BP: 124/73   Pulse: 88   Resp: 22   Temp: 97.2 °F (36.2 °C)   TempSrc: Oral   SpO2: 96%   Weight: 177 lb (80.3 kg)   Height: 5' 11\" (1.803 m)   PainSc:   0 - No pain       Hearing/Vision:      Hearing Screening    125Hz 250Hz 500Hz 1000Hz 2000Hz 3000Hz 4000Hz 6000Hz 8000Hz   Right ear:   40 40 40  40     Left ear:                Learning Assessment:     Learning Assessment 3/13/2018   PRIMARY LEARNER Patient   HIGHEST LEVEL OF EDUCATION - PRIMARY LEARNER  GRADUATED HIGH SCHOOL OR GED   BARRIERS PRIMARY LEARNER COGNITIVE   CO-LEARNER CAREGIVER Yes   PRIMARY LANGUAGE ENGLISH   LEARNER PREFERENCE PRIMARY LISTENING     DEMONSTRATION     READING   ANSWERED BY patient   RELATIONSHIP SELF     Depression Screening:     PHQ over the last two weeks 2018   PHQ Not Done Active Diagnosis of Depression or Bipolar Disorder   Little interest or pleasure in doing things -   Feeling down, depressed or hopeless -   Total Score PHQ 2 -     Fall Risk Assessment:     Fall Risk Assessment, last 12 mths 2018   Able to walk? Yes   Fall in past 12 months? Yes   Fall with injury? No   Number of falls in past 12 months 2   Fall Risk Score 2     Abuse Screening:     Abuse Screening Questionnaire 2018   Do you ever feel afraid of your partner? N   Are you in a relationship with someone who physically or mentally threatens you? N   Is it safe for you to go home? Y     Coordination of Care Questionaire:   1. Have you been to the ER, urgent care clinic since your last visit? Hospitalized since your last visit? NO    2. Have you seen or consulted any other health care providers outside of the [address] [place] [address] since your last visit? Include any pap smears or colon screening. NO

## 2018-05-31 ENCOUNTER — HOSPITAL ENCOUNTER (OUTPATIENT)
Dept: PHYSICAL THERAPY | Age: 48
Discharge: HOME | End: 2018-05-31
Payer: MEDICARE

## 2018-05-31 PROCEDURE — 97110 THERAPEUTIC EXERCISES: CPT

## 2018-05-31 PROCEDURE — 97112 NEUROMUSCULAR REEDUCATION: CPT

## 2018-05-31 NOTE — PROGRESS NOTES
PHYSICAL THERAPY - DAILY TREATMENT NOTE    Patient Name: Kiley Echavarria        Date: 2018  : 1970   yes Patient  Verified  Visit #:   4   of   8  Insurance: Payor: Louisa Marcus / Plan: [place] MED[place] & B / Product Type: Medicare /      In time: 677 Out time: 1010   Total Treatment Time: 35     Medicare Time Tracking (below)   Total Timed Codes (min): 35 1:1 Treatment Time:35     TREATMENT AREA =  Unsteadiness on feet [R26.81]    SUBJECTIVE  Pain Level (on 0 to 10 scale): 0 / 10   Medication Changes/New allergies or changes in medical history, any new surgeries or procedures?    no  If yes, update Summary List   Subjective Functional Status/Changes:  []  No changes reported     Pt denies any changes since last visit at this time. Denies any falls. OBJECTIVE    15 min Therapeutic Exercise:  [x]  See flow sheet   Rationale:      increase ROM, increase strength, improve coordination and improve balance to improve the patients ability to perform functional ADL's     20 min Neuromuscular Re-ed: SEE FLOW SHEET   Rationale:    improve coordination and improve balance to improve the patients ability to perform functional ADL's     min Patient Education:  yes  Reviewed HEP   []  Progressed/Changed HEP based on: Other Objective/Functional Measures:    See therex per flow sheet  Pt denies any sharp pains or red flags following tx    Added functional gait activities today to include: side steps in ladder with verbal and mirror cuing for forward facing toe, walking with head turns using post-it on the wall for head turn cuing (moderate difficulty with continuing forward walking motion while turning head). Able to progress towards ROM on the foam as well as MSR on the floor today with minimal sway     Post Treatment Pain Level (on 0 to 10) scale:   0  / 10     ASSESSMENT  Assessment/Changes in Function:     Will continue to progress therex within current POC as patient is able. []  See Progress Note/Recertification   Patient will continue to benefit from skilled PT services to modify and progress therapeutic interventions, address functional mobility deficits, address ROM deficits, address strength deficits, analyze and address soft tissue restrictions, analyze and cue movement patterns, analyze and modify body mechanics/ergonomics and assess and modify postural abnormalities to attain remaining goals. Progress toward goals / Updated goals:    Slow progress towards goals, however pt has been able to progress therex each visit.       PLAN  []  Upgrade activities as tolerated yes Continue plan of care   []  Discharge due to :    []  Other:      Therapist: Jon Johnson, PT, DPT    Date: 5/31/2018 Time: 4124OC     Future Appointments  Date Time Provider Jem Ruiz   5/31/2018 9:30 AM Neftaly Meléndez PT [place] [address]   6/5/2018 11:30 AM Neftaly Meléndez, PT [address]   6/7/2018 11:30 AM Melani Velasquez, PT [address]   6/13/2018 1:30 PM Alon Quiroga  [facility]

## 2018-06-04 ENCOUNTER — TELEPHONE (OUTPATIENT)
Dept: FAMILY MEDICINE CLINIC | Age: 48
End: 2018-06-04

## 2018-06-04 NOTE — LETTER
6/4/2018 8:11 AM 
 
Mr. Ermelinda Mena [address] [facility] [address] [place] [address] Dear Ermelinda Mena I have reviewed your recent lab tests and have found the results to be within normal ranges. Please call if you have any questions [phone] . Sincerely, [address] [facility] MD Eda

## 2018-06-05 ENCOUNTER — HOSPITAL ENCOUNTER (OUTPATIENT)
Dept: PHYSICAL THERAPY | Age: 48
Discharge: HOME | End: 2018-06-05
Payer: MEDICARE

## 2018-06-05 PROCEDURE — 97110 THERAPEUTIC EXERCISES: CPT

## 2018-06-05 PROCEDURE — 97112 NEUROMUSCULAR REEDUCATION: CPT

## 2018-06-05 NOTE — PROGRESS NOTES
PHYSICAL THERAPY - DAILY TREATMENT NOTE    Patient Name: Amy Yang        Date: 2018  : 1970   yes Patient  Verified  Visit #:   6   of   8  Insurance: Payor: Budd Pore / Plan: [place] MEDICARE PART A & B / Product Type: Medicare /      In time: 8145 Out time: 1210   Total Treatment Time: 40     Medicare Time Tracking (below)   Total Timed Codes (min): 40 1:1 Treatment Time:40     TREATMENT AREA =  Unsteadiness on feet [R26.81]    SUBJECTIVE  Pain Level (on 0 to 10 scale): 0 / 10   Medication Changes/New allergies or changes in medical history, any new surgeries or procedures?    no  If yes, update Summary List   Subjective Functional Status/Changes:  []  No changes reported     Pt denies any changes since last visit at this time. Denies any falls. OBJECTIVE    10 min Therapeutic Exercise:  [x]  See flow sheet   Rationale:      increase ROM, increase strength, improve coordination and improve balance to improve the patients ability to perform functional ADL's     30 min Neuromuscular Re-ed: SEE FLOW SHEET   Rationale:    improve coordination and improve balance to improve the patients ability to perform functional ADL's     min Patient Education:  yes  Reviewed HEP   []  Progressed/Changed HEP based on: Other Objective/Functional Measures:    See therex per flow sheet  Pt denies any sharp pains or red flags following tx    Pt was better with performing head turn using the post-it notes and letters today. Improved tolerance to tandem standing today, as well as improved tolerance to verbal and mirror cuing. Post Treatment Pain Level (on 0 to 10) scale:   0  / 10     ASSESSMENT  Assessment/Changes in Function:     Will continue to progress therex within current POC as patient is able.          []  See Progress Note/Recertification   Patient will continue to benefit from skilled PT services to modify and progress therapeutic interventions, address functional mobility deficits, address ROM deficits, address strength deficits, analyze and address soft tissue restrictions, analyze and cue movement patterns, analyze and modify body mechanics/ergonomics and assess and modify postural abnormalities to attain remaining goals. Progress toward goals / Updated goals:    Slow progress towards goals, however pt has been able to progress therex each visit.       PLAN  []  Upgrade activities as tolerated yes Continue plan of care   []  Discharge due to :    []  Other:      Therapist: Joe Russell PT, DPT    Date: 6/5/2018 Time: 9131YX     Future Appointments  Date Time Provider Jem Ruiz   6/7/2018 11:30 AM Lynne Velasquez PT [facility]   6/13/2018 1:30 PM Divya Hernandez  N [facility]

## 2018-06-07 ENCOUNTER — HOSPITAL ENCOUNTER (OUTPATIENT)
Dept: PHYSICAL THERAPY | Age: 48
Discharge: HOME | End: 2018-06-07
Payer: MEDICARE

## 2018-06-07 PROCEDURE — 97112 NEUROMUSCULAR REEDUCATION: CPT

## 2018-06-07 PROCEDURE — 97110 THERAPEUTIC EXERCISES: CPT

## 2018-06-07 NOTE — PROGRESS NOTES
PHYSICAL THERAPY - DAILY TREATMENT NOTE    Patient Name: Adrián Nice        Date: 2018  : 1970   yes Patient  Verified  Visit #:  7      8  Insurance: Payor: Darron Gallegos / Plan: [place] MEDICARE PART A & B / Product Type: Medicare /      In time: 3077 Out time: 5264   Total Treatment Time: 35     Medicare Time Tracking (below)   Total Timed Codes (min): 35 1:1 Treatment Time:35     TREATMENT AREA =  Unsteadiness on feet [R26.81]    SUBJECTIVE  Pain Level (on 0 to 10 scale): 0 / 10   Medication Changes/New allergies or changes in medical history, any new surgeries or procedures?    no  If yes, update Summary List   Subjective Functional Status/Changes:  []  No changes reported     Pt denies any changes since last visit at this time. Denies any falls. OBJECTIVE    10 min Therapeutic Exercise:  [x]  See flow sheet   Rationale:      increase ROM, increase strength, improve coordination and improve balance to improve the patients ability to perform functional ADL's     25 min Neuromuscular Re-ed: SEE FLOW SHEET   Rationale:    improve coordination and improve balance to improve the patients ability to perform functional ADL's     min Patient Education:  yes  Reviewed HEP   []  Progressed/Changed HEP based on: Other Objective/Functional Measures:    See therex per flow sheet  Pt denies any sharp pains or red flags following tx    Improved performance of hurdles today with less cuing required. Able to perform rocker board with no hand for side to side    Better tolerance to standing balance in tandem today with use of UE's 1x throughout the 30 seconds. Post Treatment Pain Level (on 0 to 10) scale:   0  / 10     ASSESSMENT  Assessment/Changes in Function:     Will continue to progress therex within current POC as patient is able.          []  See Progress Note/Recertification   Patient will continue to benefit from skilled PT services to modify and progress therapeutic interventions, address functional mobility deficits, address ROM deficits, address strength deficits, analyze and address soft tissue restrictions, analyze and cue movement patterns, analyze and modify body mechanics/ergonomics and assess and modify postural abnormalities to attain remaining goals. Progress toward goals / Updated goals:         PLAN  []  Upgrade activities as tolerated yes Continue plan of care   []  Discharge due to :    []  Other:      Therapist: Anjana Saleh PT, DPT    Date: 6/7/2018 Time: 12:50PM     Future Appointments  Date Time Provider Jem Ruiz   6/13/2018 1:30 PM Devaughn Contreras  N [facility]   6/14/2018 11:30 AM Adilson Velasquez [facility]   6/20/2018 1:30 PM Chantal Coronado [facility]   6/22/2018 1:30 PM Chantal Ireland [facility]   6/26/2018 11:00 AM Mundo Mcgregor [facility]   6/28/2018 11:00 AM Adilson Velasquez [facility]

## 2018-06-13 ENCOUNTER — OFFICE VISIT (OUTPATIENT)
Dept: FAMILY MEDICINE CLINIC | Age: 48
End: 2018-06-13

## 2018-06-13 VITALS
DIASTOLIC BLOOD PRESSURE: 66 MMHG | WEIGHT: 178.6 LBS | TEMPERATURE: 98 F | OXYGEN SATURATION: 96 % | HEIGHT: 71 IN | BODY MASS INDEX: 25 KG/M2 | RESPIRATION RATE: 18 BRPM | SYSTOLIC BLOOD PRESSURE: 108 MMHG | HEART RATE: 77 BPM

## 2018-06-13 DIAGNOSIS — R42 DIZZINESS: ICD-10-CM

## 2018-06-13 DIAGNOSIS — H61.22 LEFT EAR IMPACTED CERUMEN: Primary | ICD-10-CM

## 2018-06-13 DIAGNOSIS — Z87.820 H/O TRAUMATIC BRAIN INJURY: ICD-10-CM

## 2018-06-13 DIAGNOSIS — G93.1 ANOXIC BRAIN DAMAGE (HCC): ICD-10-CM

## 2018-06-13 NOTE — PROGRESS NOTES
Oseas Gill is a 49 y.o. male presents in office to have ears flushed. Health Maintenance Due   Topic Date Due    DTaP/Tdap/Td series (1 - Tdap) 07/05/1990       1. Have you been to the ER, urgent care clinic since your last visit? Hospitalized since your last visit? No    2. Have you seen or consulted any other health care providers outside of the [address] [place] [address] since your last visit? Include any pap smears or colon screening.  No

## 2018-06-13 NOTE — PROGRESS NOTES
[place]     Chief Complaint   Patient presents with    Ear Fullness     Vitals:    06/13/18 1321   BP: 108/66   Pulse: 77   Resp: 18   Temp: 98 °F (36.7 °C)   TempSrc: Oral   SpO2: 96%   Weight: 178 lb 9.6 oz (81 kg)   Height: 5' 11\" (1.803 m)   PainSc:   0 - No pain         HPI: Patient is here because of left ear wax he feel discomfort and he cannot hear well from the left ear, he is here to get his ear lavage. Patient has been doing well no dizziness spell, no hospitalization, and he had returned to his work 4 hours 3 times a week.  i accompanied the patient  Past Medical History:   Diagnosis Date    [facility] anoxic [facility] ([facility] [address]     Depression 02/01/2005    recurrent episode, Major Depression    Psychotic disorder 12/07/2007    Intellectual disability     Vertigo 03/17/2018     Past Surgical History:   Procedure Laterality Date    HX TRACHEOSTOMY N/A 1985     Social History   Substance Use Topics    Smoking status: Never Smoker    Smokeless tobacco: Never Used    Alcohol use Yes       No family history on file. Review of Systems   Constitutional: Negative for chills, fever, malaise/fatigue and weight loss. HENT: Negative for congestion, ear discharge, ear pain, hearing loss, nosebleeds, sinus pain and sore throat. Left ear discomfort   Eyes: Negative for blurred vision, double vision and discharge. Respiratory: Negative for cough, hemoptysis, shortness of breath and wheezing. Cardiovascular: Negative for chest pain, palpitations, claudication and leg swelling. Gastrointestinal: Negative for abdominal pain, constipation, diarrhea, nausea and vomiting. Genitourinary: Negative for dysuria, frequency and urgency. Musculoskeletal: Negative for back pain, joint pain, myalgias and neck pain. Skin: Negative for itching and rash. Neurological: Negative for dizziness, tingling, sensory change, speech change, focal weakness, weakness and headaches. Psychiatric/Behavioral: Negative for depression and suicidal ideas. Physical Exam   Constitutional: He is oriented to person, place, and time. He appears well-developed and well-nourished. No distress. HENT:   Head: Normocephalic and atraumatic. Right Ear: External ear normal.   Mouth/Throat: Oropharynx is clear and moist. No oropharyngeal exudate. Left ear cerumen impaction right ear exam is normal   Eyes: Conjunctivae are normal. Pupils are equal, round, and reactive to light. Right eye exhibits no discharge. Left eye exhibits no discharge. No scleral icterus. Neck: Normal range of motion. Neck supple. No thyromegaly present. Cardiovascular: Normal rate, regular rhythm and normal heart sounds. Pulmonary/Chest: Effort normal and breath sounds normal. No respiratory distress. He has no rales. Abdominal: Soft. Bowel sounds are normal. He exhibits no distension and no mass. There is no tenderness. There is no rebound. Musculoskeletal: Normal range of motion. He exhibits no edema, tenderness or deformity. Lymphadenopathy:     He has no cervical adenopathy. Neurological: He is alert and oriented to person, place, and time. Skin: Skin is warm and dry. No rash noted. He is not diaphoretic. No erythema. Psychiatric: He has a normal mood and affect. Judgment and thought content normal.        Assessment and plan     1. Left ear impacted cerumen  Left ear lavage was done today by Abiel Ford. 2. Anoxic brain damage (HCC)  Stable  3. H/O traumatic brain injury    Stable  4. Dizziness  Much improved no recurrence  Current Outpatient Prescriptions   Medication Sig Dispense Refill    meclizine (ANTIVERT) 12.5 mg tablet 12.5 mg.      temazepam (RESTORIL) 30 mg capsule TK ONE C PO QHS PRN  5    FLUoxetine (PROZAC) 20 mg capsule TK 1 C PO QD  5    ARIPiprazole (ABILIFY) 10 mg tablet TK 1 T PO QAM  5    multivitamin (ONE A DAY) tablet Take 1 Tab by mouth daily.          Patient Active Problem List Diagnosis Date Noted    Anoxic brain damage ([facility] [address] 06/13/2018    H/O traumatic brain injury 06/13/2018     Results for orders placed or performed during the hospital encounter of 05/29/18   LIPID PANEL   Result Value Ref Range    LIPID PROFILE          Cholesterol, total 162 <200 MG/DL    Triglyceride 122 <150 MG/DL    HDL Cholesterol 41 40 - 60 MG/DL    LDL, calculated 96.6 0 - 100 MG/DL    VLDL, calculated 24.4 MG/DL    CHOL/HDL Ratio 4.0 0 - 5.0       Hospital Outpatient Visit on 05/29/2018   Component Date Value Ref Range Status    LIPID PROFILE 05/29/2018        Final    Cholesterol, total 05/29/2018 162  <200 MG/DL Final    Triglyceride 05/29/2018 122  <150 MG/DL Final    Comment: The drugs N-acetylcysteine (NAC) and  Metamiszole have been found to cause falsely  low results in this chemical assay. Please  be sure to submit blood samples obtained  BEFORE administration of either of these  drugs to assure correct results.  HDL Cholesterol 05/29/2018 41  40 - 60 MG/DL Final    LDL, calculated 05/29/2018 96.6  0 - 100 MG/DL Final    VLDL, calculated 05/29/2018 24.4  MG/DL Final    CHOL/HDL Ratio 05/29/2018 4.0  0 - 5.0   Final          Follow-up Disposition:  Return as needed.

## 2018-06-14 ENCOUNTER — HOSPITAL ENCOUNTER (OUTPATIENT)
Dept: PHYSICAL THERAPY | Age: 48
Discharge: HOME | End: 2018-06-14
Payer: MEDICARE

## 2018-06-14 PROCEDURE — G8979 MOBILITY GOAL STATUS: HCPCS

## 2018-06-14 PROCEDURE — 97112 NEUROMUSCULAR REEDUCATION: CPT

## 2018-06-14 PROCEDURE — G8978 MOBILITY CURRENT STATUS: HCPCS

## 2018-06-14 PROCEDURE — 97110 THERAPEUTIC EXERCISES: CPT

## 2018-06-14 NOTE — PROGRESS NOTES
PHYSICAL THERAPY - DAILY TREATMENT NOTE    Patient Name: Cody Sauer        Date: 2018  : 1970   yes Patient  Verified  Visit #:     Insurance: Payor: Sunil Richter / Plan: [place] MEDICARE PART A & B / Product Type: Medicare /      In time: 1140 Out time: 1210   Total Treatment Time: 30     Medicare Time Tracking (below)   Total Timed Codes (min):  30 1:1 Treatment Time:  30     TREATMENT AREA =  Unsteadiness on feet [R26.81]    SUBJECTIVE  Pain Level (on 0 to 10 scale):  0  / 10   Medication Changes/New allergies or changes in medical history, any new surgeries or procedures?    no  If yes, update Summary List   Subjective Functional Status/Changes:  []  No changes reported     SEE PN         OBJECTIVE     ND min Therapeutic Exercise:  [x]  See flow sheet   Rationale:      improve coordination and improve balance to improve the patients ability to perform functional ADL's     30 min Neuromuscular Re-ed: GUTIERREZ balance test- balance reassessment   Rationale:    improve balance and increase proprioception to improve the patients ability to perform functional ADL's         min Patient Education:  yes  Reviewed HEP   []  Progressed/Changed HEP based on: Other Objective/Functional Measures:    SEE PN     Post Treatment Pain Level (on 0 to 10) scale:   0  / 10     ASSESSMENT  Assessment/Changes in Function:     SEE PN     []  See Progress Note/Recertification   Patient will continue to benefit from skilled PT services to modify and progress therapeutic interventions, address functional mobility deficits, address ROM deficits, address strength deficits, analyze and address soft tissue restrictions, analyze and cue movement patterns, analyze and modify body mechanics/ergonomics and assess and modify postural abnormalities to attain remaining goals.    Progress toward goals / Updated goals:    SEE PN     PLAN  [x]  Upgrade activities as tolerated yes Continue plan of care   []  Discharge due to :    [x]  Other: Continue skilled PT 2x/ week for 6-8 sessions     Therapist: Leeanna Atkinson, PT, DPT    Date: 6/14/2018 Time: 7:52 AM     Future Appointments  Date Time Provider Jem Ruiz   6/14/2018 11:30 AM Dulce Velasquez, PT [facility]   6/20/2018 1:30 PM Chantal Gallagher[address]   6/22/2018 1:30 PM Chantal Chow [facility]   6/26/2018 11:00 AM Abiel Murphy, PT [facility]   6/28/2018 11:00 AM Dulce Velasquez, PT [facility]

## 2018-06-14 NOTE — PROGRESS NOTES
[facility] PHYSICAL THERAPY  [address] [place] [address],[facility], [facility] - Phone: [phone]  Fax: [phone]  PROGRESS NOTE  Patient Name: Roxana Sadlana : 1970   Treatment/Medical Diagnosis: Unsteadiness on feet [R26.81]   Referral Source: Clara Munoz*     Date of Initial Visit: 5/10/18 Attended Visits: 8 Missed Visits: CX 0  NS 0     SUMMARY OF TREATMENT  Pt was seen on 5/10/18 for an initial evaluation for unsteadiness on feet and decreased balance. Pt has been seen for 8 visits and therapy has included: therapeutic exercise, gait training, neuromuscular re-education, and modalities for pain control, patient education and HEP. CURRENT STATUS  Pt has been seen for 8 visits since his IE on 5/10/18. Pt had been progressed through therex with focus on static and dynamic standing balance, improving ambulation and dynamic gait activities, as well as LE strengthening. Pt continues to require moderate visual and tactile cuing for all exercises. Pt continues to also have increased amounts of difficulty with all balance involving eyes closed as well as walking while turning his head. Pt has improved static standing balance and is able to perform tandem standing for 30 seconds with minimal sway. Pt would benefit from continues skilled PT 6-8 additional sessions in order to progress dynamic standing balance, as well as improve functional gait activities for safety at home and in the community. Reassessment of goals is listed below:    GOALS:  · Short Term Goals: To be accomplished in  2-3  weeks:  1. Pt will be educated in appropriate HEP to improve LE strength/stability for amb, standing, and transfers- goal met, will continue to progress  2. Pt will maintain SLS bilaterally >/= 10\" for increased safety with ambulation- goal not met, approximately 5 seconds with sway and use of UE for support. Will continue to progress     · Long Term Goals:  To be accomplished in  4-6  weeks:  1. Pt will improve Youngblood balance score to >/= 49 to demo a significant improvement in decreasing risk for fall- goal met 50/100  2. Pt will achieve >/= +4 GROC in order to promote increased activity tolerance- +1 goal not met  3. Pt will maintain Romberg, EC on floor >/= 10 seconds for reduced fall risk with ambulation- goal not met, difficulty with EC for more than 5 seconds. Will continue to address      G-CODES:  Mobility  Current  CJ= 20-39%   Goal  CI= 1-19%. The severity rating is based on the FOTO Score      RECOMMENDATIONS  Continue skilled PT 6-8 additional session in order to progress towards the above unmet goals and address safety with walking and ADL's. If you have any questions/comments please contact us directly at [phone]. Thank you for allowing us to assist in the care of your patient. Therapist Signature: Hiren Esposito PT, DPT Date: 6/14/2018   Reporting Period: 5/10/18-6/14/18 Time: 7:36 AM   NOTE TO PHYSICIAN:  PLEASE COMPLETE THE ORDERS BELOW AND FAX TO   [facility] Physical Therapy: [phone]  If you are unable to process this request in 24 hours please contact our office: [phone]    ___ I have read the above report and request that my patient continue as recommended.   ___ I have read the above report and request that my patient continue therapy with the following changes/special instructions:_________________________________________________________   ___ I have read the above report and request that my patient be discharged from therapy.      Physician Signature:        Date:       Time:

## 2018-06-20 ENCOUNTER — HOSPITAL ENCOUNTER (OUTPATIENT)
Dept: PHYSICAL THERAPY | Age: 48
Discharge: HOME | End: 2018-06-20
Payer: MEDICARE

## 2018-06-20 PROCEDURE — 97112 NEUROMUSCULAR REEDUCATION: CPT

## 2018-06-20 NOTE — PROGRESS NOTES
PHYSICAL THERAPY - DAILY TREATMENT NOTE    Patient Name: Kathrine Rojo        Date: 2018  : 1970   yes Patient  Verified  Visit #:     Insurance: Payor: Robert Marc / Plan: [place] MEDICARE PART A & B / Product Type: Medicare /      In time: 1:25 Out time: 2:05   Total Treatment Time: 40     Medicare/BCBS Time Tracking (below)   Total Timed Codes (min):  40 1:1 Treatment Time:  40     TREATMENT AREA =  Unsteadiness on feet [R26.81]    SUBJECTIVE  Pain Level (on 0 to 10 scale):  0  / 10   Medication Changes/New allergies or changes in medical history, any new surgeries or procedures?    no  If yes, update Summary List   Subjective Functional Status/Changes:  []  No changes reported     Pt and caregiver report no LOB or difficulty with ADL's related to balance. 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 OBJECTIVE      40 min Neuromuscular Re-ed: See flow sheet   Rationale:    improve coordination, improve balance and increase proprioception to improve the patients ability to perform functional mobility/ADLs and attain goals. min Patient Education:  yes  Reviewed HEP   []  Progressed/Changed HEP based on: Other Objective/Functional Measures:    -added fwd/backward steps over balance board with 1 UE suppport  -ambulated 40'x2 with horizontal head turns and moderate vc's.  -cues to maintain gaze at horizon with balance exercises; pt with poor maintenance.  -added ball toss with ambulation; significant decrease in gait speed     Post Treatment Pain Level (on 0 to 10) scale:   0  / 10     ASSESSMENT  Assessment/Changes in Function:     Pt demonstrates slow improvement to balance reactions with EO/EC in static standing and with horizontal head turns vs. First session with this therapist.  He does continue to be limited with ability to perform more than one cognitive/motor task simultaneously (i.e. Ball toss, or maintain gaze off floor with balance exercise).        [] See Progress Note/Recertification   Patient will continue to benefit from skilled PT services to modify and progress therapeutic interventions, address functional mobility deficits and address imbalance/dizziness to attain remaining goals. Progress toward goals / Updated goals:    Slow progression with SLS and EC goals; not yet consistent     PLAN  []  Upgrade activities as tolerated yes Continue plan of care   []  Discharge due to :    []  Other:      Therapist: Nida Dancer. Oumou Bynum, PT    Date: 6/20/2018 Time: 1:22 PM     Future Appointments  Date Time Provider Jem Ruiz   6/20/2018 1:30 PM Chantal Bynum, [address] [place] [address]   6/22/2018 1:30 PM Chantal Zuñiga [facility]   6/26/2018 11:00 AM Shayla Coughlin, PT [facility]   6/28/2018 11:00 AM Nyla Velasquez, PT [facility]

## 2018-06-22 ENCOUNTER — HOSPITAL ENCOUNTER (OUTPATIENT)
Dept: PHYSICAL THERAPY | Age: 48
Discharge: HOME | End: 2018-06-22
Payer: MEDICARE

## 2018-06-22 PROCEDURE — 97112 NEUROMUSCULAR REEDUCATION: CPT

## 2018-06-22 NOTE — PROGRESS NOTES
PHYSICAL THERAPY - DAILY TREATMENT NOTE    Patient Name: Ana Rosa Coyle        Date: 2018  : 1970   yes Patient  Verified  Visit #:   10   of   14-16  Insurance: Payor: Gio Villanueva / Plan: [place] MED[place] & B / Product Type: Medicare /      In time: 1:30 Out time: 2:00   Total Treatment Time: 30     Medicare/BCBS Time Tracking (below)   Total Timed Codes (min):  30 1:1 Treatment Time:  30     TREATMENT AREA =  Unsteadiness on feet [R26.81]    SUBJECTIVE  Pain Level (on 0 to 10 scale):  0  / 10   Medication Changes/New allergies or changes in medical history, any new surgeries or procedures?    no  If yes, update Summary List   Subjective Functional Status/Changes:  []  No changes reported     No changes reported per pt/caregiver. OBJECTIVE      30 min Neuromuscular Re-ed: See flow sheet   Rationale:    improve coordination, improve balance and increase proprioception to improve the patients ability to perform functional mobility/ADLs and attain goals. min Patient Education:  yes  Reviewed HEP   []  Progressed/Changed HEP based on: Other Objective/Functional Measures:    -added side staci negotiation in // bars with heavy manual/verbal cues   -added obstacle course: sm, and large staci, 6 in step, on/off Airex, 180 degrees, side stepping, and up/down 4 steps (supervision to ascend, descend with use of B rails). Post Treatment Pain Level (on 0 to 10) scale:   0  / 10     ASSESSMENT  Assessment/Changes in Function:     Pt demonstrates no overt LOB during today's session, however continues to require max encouragement to maintain EC balance. Ongoing difficulty with multi-tasking during activities limiting progression of dynamic balance exercises.         []  See Progress Note/Recertification   Patient will continue to benefit from skilled PT services to modify and progress therapeutic interventions, address functional mobility deficits and address imbalance/dizziness to attain remaining goals. Progress toward goals / Updated goals:    Slow progression with SLS and EC goals; not yet consistent      PLAN  []  Upgrade activities as tolerated yes Continue plan of care   []  Discharge due to :    []  Other:      Therapist: Meli Clark. Bekah January, PT    Date: 6/22/2018 Time: 1:22 PM     Future Appointments  Date Time Provider Jem Ruiz   [facility]2018 [facility]   6/26/2018 11:00 AM Satish Hamilton, PT [facility]   6/28/2018 11:00 AM Vitaly Velasquez, PT [facility]

## 2018-06-26 ENCOUNTER — HOSPITAL ENCOUNTER (OUTPATIENT)
Dept: PHYSICAL THERAPY | Age: 48
Discharge: HOME | End: 2018-06-26
Payer: MEDICARE

## 2018-06-26 PROCEDURE — 97112 NEUROMUSCULAR REEDUCATION: CPT

## 2018-06-28 ENCOUNTER — HOSPITAL ENCOUNTER (OUTPATIENT)
Dept: PHYSICAL THERAPY | Age: 48
Discharge: HOME | End: 2018-06-28
Payer: MEDICARE

## 2018-06-28 PROCEDURE — 97112 NEUROMUSCULAR REEDUCATION: CPT

## 2018-06-28 NOTE — PROGRESS NOTES
PHYSICAL THERAPY - DAILY TREATMENT NOTE    Patient Name: Litzy Pradhan        Date: 2018  : 1970   yes Patient  Verified  Visit #:     Insurance: Payor: Kenyon Jaeger / Plan: [place] MEDICARE PART A & B / Product Type: Medicare /      In time: 1100 Out time: 4339   Total Treatment Time: 35     Medicare/BCBS Time Tracking (below)   Total Timed Codes (min):  35 1:1 Treatment Time:  35     TREATMENT AREA =  Unsteadiness on feet [R26.81]    SUBJECTIVE  Pain Level (on 0 to 10 scale): 0 / 10   Medication Changes/New allergies or changes in medical history, any new surgeries or procedures?    no  If yes, update Summary List   Subjective Functional Status/Changes:  []  No changes reported     Pt denies any complaints at this time       OBJECTIVE      30 min Neuromuscular Re-ed: See flow sheet   Rationale:    improve coordination, improve balance and increase proprioception to improve the patients ability to perform functional mobility/ADLs and attain goals. min Patient Education:  yes  Reviewed HEP   []  Progressed/Changed HEP based on: Other Objective/Functional Measures:    See therex per flow sheet       Post Treatment Pain Level (on 0 to 10) scale:   0 / 10     ASSESSMENT  Assessment/Changes in Function:     Discussed with caregiver plan to continue 2 visits (either 2x next week or 1x a week for 2 weeks) then likely DC to long term HEP. Continues to have most difficulty with eyes closed activities with reports of \"I feel dizzy) with 10 seconds of EC. Pt declined attempting TM walking today. []  See Progress Note/Recertification   Patient will continue to benefit from skilled PT services to modify and progress therapeutic interventions, address functional mobility deficits and address imbalance/dizziness to attain remaining goals.    Progress toward goals / Updated goals:         PLAN  []  Upgrade activities as tolerated yes Continue plan of care   []  Discharge due to :    []  Other:      Therapist: Hilaria Gaucher, PT    Date: 6/28/2018 Time: 1100     Future Appointments  Date Time Provider Jem Ruiz   6/28/2018 11:00 AM Joanie Velasquez, PT [facility]

## 2018-07-09 ENCOUNTER — HOSPITAL ENCOUNTER (OUTPATIENT)
Dept: PHYSICAL THERAPY | Age: 48
Discharge: HOME | End: 2018-07-09
Payer: MEDICARE

## 2018-07-09 PROCEDURE — 97112 NEUROMUSCULAR REEDUCATION: CPT

## 2018-07-09 NOTE — PROGRESS NOTES
PHYSICAL THERAPY - DAILY TREATMENT NOTE    Patient Name: Hari Cartagena        Date: 2018  : 1970   yes Patient  Verified  Visit #:   15   of   14-16  Insurance: Payor: Daniel Back / Plan: [place] MEDICARE PART A & B / Product Type: Medicare /      In time: 430 Out time: 500   Total Treatment Time: 30     Medicare/BCBS Time Tracking (below)   Total Timed Codes (min): 30 1:1 Treatment Time:  30     TREATMENT AREA =  Unsteadiness on feet [R26.81]    SUBJECTIVE  Pain Level (on 0 to 10 scale): 0 / 10   Medication Changes/New allergies or changes in medical history, any new surgeries or procedures?    no  If yes, update Summary List   Subjective Functional Status/Changes:  []  No changes reported     No new complaints per pt report       OBJECTIVE      30 min Neuromuscular Re-ed: See flow sheet   Rationale:    improve coordination, improve balance and increase proprioception to improve the patients ability to perform functional mobility/ADLs and attain goals. min Patient Education:  yes  Reviewed HEP   []  Progressed/Changed HEP based on: Other Objective/Functional Measures:    See therex per flow sheet         Post Treatment Pain Level (on 0 to 10) scale:   0 / 10     ASSESSMENT  Assessment/Changes in Function:     Pt is approaching max benefit from therapy and will be DC to long term HEP NV. Pt continues to deny falls or LOB at home and community over the last several weeks. []  See Progress Note/Recertification   Patient will continue to benefit from skilled PT services to modify and progress therapeutic interventions, address functional mobility deficits and address imbalance/dizziness to attain remaining goals.    Progress toward goals / Updated goals:    Pt will be seen 1 remaining visits then DC to long term HEP     PLAN  []  Upgrade activities as tolerated yes Continue plan of care   []  Discharge due to :    []  Other:      Therapist: Kimberlyn Newman, PT    Date: 2018 Time: 600PM     Future Appointments  Date Time Provider Jem Ruiz   7/9/2018 4:30 PM Amaya Ochoa, PT [facility]   7/18/2018 2:00 PM John Velasquez, PT [facility]

## 2018-07-18 ENCOUNTER — HOSPITAL ENCOUNTER (OUTPATIENT)
Dept: PHYSICAL THERAPY | Age: 48
Discharge: HOME | End: 2018-07-18
Payer: MEDICARE

## 2018-07-18 PROCEDURE — 97112 NEUROMUSCULAR REEDUCATION: CPT

## 2018-07-18 NOTE — PROGRESS NOTES
PHYSICAL THERAPY - DAILY TREATMENT NOTE    Patient Name: Santosh Souza        Date: 2018  : 1970   yes Patient  Verified  Visit #:   15   of   14-16  Insurance: Payor: Shekhar Saldana / Plan: [place] MEDICARE PART A & B / Product Type: Medicare /      In time: 200 Out time: 230   Total Treatment Time: 30     Medicare/BCBS Time Tracking (below)   Total Timed Codes (min): 30 1:1 Treatment Time:  30     TREATMENT AREA =  Unsteadiness on feet [R26.81]    SUBJECTIVE  Pain Level (on 0 to 10 scale): 0 / 10   Medication Changes/New allergies or changes in medical history, any new surgeries or procedures?    no  If yes, update Summary List   Subjective Functional Status/Changes:  []  No changes reported     SEE DC SUMMARY       OBJECTIVE      30 min Neuromuscular Re-ed: See flow sheet   Rationale:    improve coordination, improve balance and increase proprioception to improve the patients ability to perform functional mobility/ADLs and attain goals. min Patient Education:  yes  Reviewed HEP   []  Progressed/Changed HEP based on:  Educated patient and caregiver on HEP continuation at home following DC with handouts given. Pt and caregiver verbalized understanding. Other Objective/Functional Measures:    SEE DC SUMMARY         Post Treatment Pain Level (on 0 to 10) scale:   0 / 10     ASSESSMENT  Assessment/Changes in Function:     SEE DC SUMMARY. []  See Progress Note/Recertification      Progress toward goals / Updated goals:    SEE DC SUMMARY     PLAN  []  Upgrade activities as tolerated no Continue plan of care   [x]  Discharge due to : Max benefit reached in PT at this time.     []  Other:      Therapist: Janis Mathis, PT    Date: 2018 Time: 200PM     Future Appointments  Date Time Provider Jem Ruiz   2018 2:00 PM Real Velasquez, PT [address]

## 2018-07-18 NOTE — PROGRESS NOTES
[address] [place] [address], [facility] - Phone: [phone]  Fax: [phone]  Marycruz Liamkhari Don Dawkins [facility], [facility] PHYSICAL THERAPY          Patient Name: Avtar Galindo : 1970   Treatment/Medical Diagnosis: Unsteadiness on feet [R26.81]   Onset Date: 3/20/18    Referral Source: Jayleen Bal* Start of Care [facility]): 5/10/18   Prior Hospitalization: See Medical History Provider #: [id number]   Prior Level of Function: Lives in group home d/t cognitive deficits. Independent with ambulation without AD. Comorbidities: Depression, TBI (age 12)   Medications: Verified on Patient Summary List   Visits from [facility]: 14 Missed Visits: 0     · Short Term Goals: To be accomplished in  2-3  weeks:  1.  Pt will be educated in appropriate HEP to improve LE strength/stability for amb, standing, and transfers- goal met  2. Pt will maintain SLS bilaterally >/= 10\" for increased safety with ambulation- goal not met, approximately 5 seconds with sway and use of UE for support. Will continue to progress     · Long Term Goals: To be accomplished in  4-6  weeks:  1. Pt will improve Youngblood balance score to >/= 49 to demo a significant improvement in decreasing risk for fall- goal met 50/100  2. Pt will achieve >/= +4 GROC in order to promote increased activity tolerance- +7, goal met  3. Pt will maintain Romberg, EC on floor >/= 10 seconds for reduced fall risk with ambulation- goal met, however required ample cuing for keeping EC due to increased anxiety and mild increase in sway.       Key Functional Changes/Progress:  Pt has been seen for 14 visits since IE on 5/10/18. Pt has denied any pain throughout therapy. Pt also denies any falls at home or in the community since beginning PT. Pt has progressed well through balance therex with focus on improving dynamic standing and walking balance with head turns and changes in directions.  Pt required moderate cuing for keeping on task throughout therapy, but was able to progress to SLS for approx. 5 seconds due to mild anxiety with EC activities. Pt will be DC to long term HEP at this time and has been educated along with caregiver as to appropriate continuation of HEP at home. G-Codes (GP): Mobility  [id number] Goal  CI= 1-19%  D/C  CI= 1-19%. The severity rating is based on the FOTO Score      Assessments/Recommendations: Discontinue therapy. Progressing towards or have reached established goals. If you have any questions/comments please contact us directly at [phone]. Thank you for allowing us to assist in the care of your patient. Therapist Signature: Margo Martinez PT, DPT   Date: 7/18/2018   Reporting Period: 6/14/18-7/18/2018 Time: 9:06 AM     NOTE TO PHYSICIAN:  Your patient's insurance requires this discharge note be signed and returned. PLEASE COMPLETE THE ORDERS BELOW AND RETURN TO:  [facility] PHYSICAL THERAPY    ___ I have read the above report and request that my patient be discharged from therapy.      Physician Signature:        Date:       Time:

## 2018-08-01 ENCOUNTER — TELEPHONE (OUTPATIENT)
Dept: FAMILY MEDICINE CLINIC | Age: 48
End: 2018-08-01

## 2018-08-01 NOTE — TELEPHONE ENCOUNTER
Pt is not feeling well since Saturday. Feeling groggy, congested, no other symptoms reported. Pt was given mucinex 600 mg 12hr release on Sunday at 10 am, Monday at 2 pm, then Monday again at 8pm. Since it is protocol to call the pcp for possible side effects, the [facility] called to inquire if there would be any side effects. Per SYLVAIN Gutiérrez, there should not be any detrimental s/e other than watching for grogginess or dizziness. Also make sure that pt will drink plenty of water and not give any more mucinex until appt with Dr. Brenda Carolina tomorrow.

## 2018-08-02 ENCOUNTER — OFFICE VISIT (OUTPATIENT)
Dept: FAMILY MEDICINE CLINIC | Age: 48
End: 2018-08-02

## 2018-08-02 VITALS
WEIGHT: 173 LBS | OXYGEN SATURATION: 97 % | BODY MASS INDEX: 24.22 KG/M2 | HEIGHT: 71 IN | HEART RATE: 92 BPM | SYSTOLIC BLOOD PRESSURE: 104 MMHG | RESPIRATION RATE: 18 BRPM | TEMPERATURE: 99.1 F | DIASTOLIC BLOOD PRESSURE: 74 MMHG

## 2018-08-02 DIAGNOSIS — Z87.820 H/O TRAUMATIC BRAIN INJURY: ICD-10-CM

## 2018-08-02 DIAGNOSIS — J06.9 UPPER RESPIRATORY TRACT INFECTION, UNSPECIFIED TYPE: Primary | ICD-10-CM

## 2018-08-02 DIAGNOSIS — R09.81 NASAL CONGESTION: ICD-10-CM

## 2018-08-02 DIAGNOSIS — G93.1 ANOXIC BRAIN DAMAGE (HCC): ICD-10-CM

## 2018-08-02 NOTE — PROGRESS NOTES
Jon Milner is a 51 y.o. male (: 1970) presenting to address:    Chief Complaint   Patient presents with    Cough     x 5 days       Vitals:    18 1344   BP: 104/74   Pulse: 92   Resp: 18   Temp: (!) 49.8 °F (9.9 °C)   TempSrc: Oral   SpO2: 97%   Weight: 173 lb (78.5 kg)   Height: 5' 11\" (1.803 m)   PainSc:   0 - No pain       Hearing/Vision:   No exam data present    Learning Assessment:     Learning Assessment 3/13/2018   PRIMARY LEARNER Patient   HIGHEST LEVEL OF EDUCATION - PRIMARY LEARNER  GRADUATED HIGH SCHOOL OR GED   BARRIERS PRIMARY LEARNER COGNITIVE   CO-LEARNER CAREGIVER Yes   PRIMARY LANGUAGE ENGLISH   LEARNER PREFERENCE PRIMARY LISTENING     DEMONSTRATION     READING   ANSWERED BY patient   RELATIONSHIP SELF     Depression Screening:     PHQ over the last two weeks 2018   PHQ Not Done Active Diagnosis of Depression or Bipolar Disorder   Little interest or pleasure in doing things -   Feeling down, depressed, irritable, or hopeless -   Total Score PHQ 2 -     Fall Risk Assessment:     Fall Risk Assessment, last 12 mths 2018   Able to walk? Yes   Fall in past 12 months? Yes   Fall with injury? No   Number of falls in past 12 months 2   Fall Risk Score 2     Abuse Screening:     Abuse Screening Questionnaire 2018   Do you ever feel afraid of your partner? N   Are you in a relationship with someone who physically or mentally threatens you? N   Is it safe for you to go home? Y     Coordination of Care Questionaire:   1. Have you been to the ER, urgent care clinic since your last visit? Hospitalized since your last visit? NO    2. Have you seen or consulted any other health care providers outside of the [address] [place] [address] since your last visit? Include any pap smears or colon screening.  NO

## 2018-08-02 NOTE — PROGRESS NOTES
Matthew Ragsdale     Chief Complaint   Patient presents with    Cough     x 5 days     Vitals:    08/02/18 1344   BP: 104/74   Pulse: 92   Resp: 18   Temp: 99.1 °F (37.3 °C)   TempSrc: Oral   SpO2: 97%   Weight: 173 lb (78.5 kg)   Height: 5' 11\" (1.803 m)   PainSc:   0 - No pain         HPI: Patient is brought in by his caregiver for cold symptoms has been  for 6 days, no fever no chills no nausea no vomiting normal appetite no dizziness no headaches,, he has nasal congestion and cough. No sore throat and no difficulty with swallowing or breathing. Patient has been on guaifenesin 600 mg twice daily as needed for cough. No other medications      Patient has anoxic brain injury and his memory is short-term, and he has a tendency to get lost and he has to be accompanied by somebody most of the time, he can ambulate independently can cross a street to himself and he can take private transportation to and from work. Patient does not take up with transportation because he might be lost.    This history has been reviewed with his /care giver. The above information is needed to complete his disability form, form was completed today. Past Medical History:   Diagnosis Date    Brain anoxic injury ([facility]     Depression 02/01/2005    recurrent episode, Major Depression    Psychotic disorder 12/07/2007    Intellectual disability     Vertigo 03/17/2018     Past Surgical History:   Procedure Laterality Date    HX TRACHEOSTOMY N/A 1985     Social History   Substance Use Topics    Smoking status: Never Smoker    Smokeless tobacco: Never Used    Alcohol use Yes       No family history on file. Review of Systems   Constitutional: Negative for chills, fever, malaise/fatigue and weight loss. HENT: Positive for congestion. Negative for ear discharge, ear pain, hearing loss, nosebleeds, sinus pain and sore throat. Eyes: Negative for blurred vision, double vision and discharge.    Respiratory: Positive for cough. Negative for hemoptysis, sputum production, shortness of breath and wheezing. Cardiovascular: Negative for chest pain, palpitations, claudication and leg swelling. Gastrointestinal: Negative for abdominal pain, constipation, diarrhea, nausea and vomiting. Genitourinary: Negative for dysuria, frequency and urgency. Musculoskeletal: Negative for back pain, joint pain, myalgias and neck pain. Skin: Negative for itching and rash. Neurological: Negative for dizziness, tingling, sensory change, speech change, focal weakness, weakness and headaches. Psychiatric/Behavioral: Negative for depression, hallucinations, substance abuse and suicidal ideas. The patient is not nervous/anxious and does not have insomnia. Physical Exam   Constitutional: He is oriented to person, place, and time. He appears well-developed and well-nourished. No distress. HENT:   Head: Normocephalic and atraumatic. Mouth/Throat: No oropharyngeal exudate. Eyes: Conjunctivae are normal. Pupils are equal, round, and reactive to light. Right eye exhibits no discharge. Left eye exhibits no discharge. No scleral icterus. Neck: Normal range of motion. Neck supple. No thyromegaly present. Cardiovascular: Normal rate, regular rhythm and normal heart sounds. Pulmonary/Chest: Effort normal and breath sounds normal. No respiratory distress. He has no rales. Abdominal: Soft. He exhibits no distension and no mass. There is no tenderness. There is no rebound. Musculoskeletal: Normal range of motion. He exhibits no edema, tenderness or deformity. Lymphadenopathy:     He has no cervical adenopathy. Neurological: He is alert and oriented to person, place, and time. Coordination abnormal.   Skin: Skin is warm and dry. No rash noted. He is not diaphoretic. No erythema. Psychiatric: He has a normal mood and affect. Judgment and thought content normal.        Assessment and plan     1.  Anoxic brain damage ([facility]  Stable no acute changes    2. H/O traumatic brain injury      3. Upper respiratory tract infection, unspecified type  Symptomatic treatment continue guaifenesin 600 mg as needed twice daily for cough    Plan of care has been discussed with his caregiver and written instruction were given as well. 4. Nasal congestion  Advised to get saline nasal spray        Form for disability was completed with the help of his caregiver/    Current Outpatient Prescriptions   Medication Sig Dispense Refill    meclizine (ANTIVERT) 12.5 mg tablet 12.5 mg.      temazepam (RESTORIL) 30 mg capsule TK ONE C PO QHS PRN  5    FLUoxetine (PROZAC) 20 mg capsule TK 1 C PO QD  5    ARIPiprazole (ABILIFY) 10 mg tablet TK 1 T PO QAM  5    multivitamin (ONE A DAY) tablet Take 1 Tab by mouth daily. Patient Active Problem List    Diagnosis Date Noted    Anoxic brain damage ([facility] 06/13/2018    H/O traumatic brain injury 06/13/2018     Results for orders placed or performed during the hospital encounter of 05/29/18   LIPID PANEL   Result Value Ref Range    LIPID PROFILE          Cholesterol, total 162 <200 MG/DL    Triglyceride 122 <150 MG/DL    HDL Cholesterol 41 40 - 60 MG/DL    LDL, calculated 96.6 0 - 100 MG/DL    VLDL, calculated 24.4 MG/DL    CHOL/HDL Ratio 4.0 0 - 5.0       Hospital Outpatient Visit on 05/29/2018   Component Date Value Ref Range Status    LIPID PROFILE 05/29/2018        Final    Cholesterol, total 05/29/2018 162  <200 MG/DL Final    Triglyceride 05/29/2018 122  <150 MG/DL Final    Comment: The drugs N-acetylcysteine (NAC) and  Metamiszole have been found to cause falsely  low results in this chemical assay. Please  be sure to submit blood samples obtained  BEFORE administration of either of these  drugs to assure correct results.       HDL Cholesterol 05/29/2018 41  40 - 60 MG/DL Final    LDL, calculated 05/29/2018 96.6  0 - 100 MG/DL Final    VLDL, calculated 05/29/2018 24.4  MG/DL Final    CHOL/HDL Ratio 05/29/2018 4.0  0 - 5.0   Final          Follow-up Disposition:  Return if symptoms worsen or fail to improve.

## 2018-08-27 ENCOUNTER — OFFICE VISIT (OUTPATIENT)
Dept: FAMILY MEDICINE CLINIC | Age: 48
End: 2018-08-27

## 2018-08-27 VITALS
HEART RATE: 94 BPM | SYSTOLIC BLOOD PRESSURE: 108 MMHG | DIASTOLIC BLOOD PRESSURE: 62 MMHG | OXYGEN SATURATION: 96 % | RESPIRATION RATE: 17 BRPM | TEMPERATURE: 97.2 F | BODY MASS INDEX: 24.5 KG/M2 | WEIGHT: 175 LBS | HEIGHT: 71 IN

## 2018-08-27 DIAGNOSIS — H61.22 LEFT EAR IMPACTED CERUMEN: Primary | ICD-10-CM

## 2018-08-27 DIAGNOSIS — R42 DIZZINESS: ICD-10-CM

## 2018-08-27 DIAGNOSIS — Z87.820 H/O TRAUMATIC BRAIN INJURY: ICD-10-CM

## 2018-08-27 DIAGNOSIS — G93.1 ANOXIC BRAIN DAMAGE (HCC): ICD-10-CM

## 2018-08-27 RX ORDER — MECLIZINE HCL 12.5 MG 12.5 MG/1
12.5 TABLET ORAL
Qty: 30 TAB | Refills: 2 | Status: SHIPPED | OUTPATIENT
Start: 2018-08-27 | End: 2019-12-10 | Stop reason: SDUPTHER

## 2018-08-27 RX ORDER — ARIPIPRAZOLE 5 MG/1
5 TABLET ORAL DAILY
COMMUNITY
End: 2020-09-10

## 2018-10-04 ENCOUNTER — TELEPHONE (OUTPATIENT)
Dept: FAMILY MEDICINE CLINIC | Age: 48
End: 2018-10-04

## 2018-10-04 NOTE — TELEPHONE ENCOUNTER
for patient called this AM and stated that the missed his morning dose of medication. Wanted to know if patient could still take it. Stated that he missed the 1 hour window. Medication list reviewed with  and notified that it would still be okay to take his medications. Verbal understanding given. Closing encounter.

## 2018-12-05 ENCOUNTER — OFFICE VISIT (OUTPATIENT)
Dept: FAMILY MEDICINE CLINIC | Age: 48
End: 2018-12-05

## 2018-12-05 VITALS
HEIGHT: 71 IN | BODY MASS INDEX: 24.36 KG/M2 | SYSTOLIC BLOOD PRESSURE: 108 MMHG | OXYGEN SATURATION: 100 % | HEART RATE: 88 BPM | WEIGHT: 174 LBS | TEMPERATURE: 98.3 F | DIASTOLIC BLOOD PRESSURE: 73 MMHG | RESPIRATION RATE: 17 BRPM

## 2018-12-05 DIAGNOSIS — R09.81 NASAL CONGESTION: ICD-10-CM

## 2018-12-05 DIAGNOSIS — R05.9 COUGH: ICD-10-CM

## 2018-12-05 DIAGNOSIS — J06.9 URTI (ACUTE UPPER RESPIRATORY INFECTION): Primary | ICD-10-CM

## 2018-12-05 DIAGNOSIS — A49.9 BACTERIAL INFECTION: ICD-10-CM

## 2018-12-05 RX ORDER — ARIPIPRAZOLE 2 MG/1
2 TABLET ORAL DAILY
COMMUNITY
End: 2021-10-01 | Stop reason: DRUGHIGH

## 2018-12-05 RX ORDER — BISMUTH SUBSALICYLATE 262 MG
1 TABLET,CHEWABLE ORAL DAILY
Qty: 90 TAB | Refills: 3 | Status: SHIPPED | OUTPATIENT
Start: 2018-12-05 | End: 2019-12-03 | Stop reason: SDUPTHER

## 2018-12-05 RX ORDER — BACITRACIN ZINC 500 UNIT/G
OINTMENT (GRAM) TOPICAL 2 TIMES DAILY
Qty: 15 G | Refills: 1 | Status: SHIPPED | OUTPATIENT
Start: 2018-12-05 | End: 2018-12-05 | Stop reason: SDUPTHER

## 2018-12-05 RX ORDER — GUAIFENESIN 600 MG/1
600 TABLET, EXTENDED RELEASE ORAL 2 TIMES DAILY
Qty: 20 TAB | Refills: 1 | Status: SHIPPED | OUTPATIENT
Start: 2018-12-05 | End: 2019-12-17 | Stop reason: SDUPTHER

## 2018-12-05 RX ORDER — BACITRACIN ZINC 500 UNIT/G
OINTMENT (GRAM) TOPICAL 2 TIMES DAILY
Qty: 15 G | Refills: 1 | Status: SHIPPED | OUTPATIENT
Start: 2018-12-05 | End: 2019-12-17 | Stop reason: SDUPTHER

## 2018-12-05 RX ORDER — ACETAMINOPHEN 500 MG
500 TABLET ORAL
Qty: 30 TAB | Refills: 2 | Status: SHIPPED | OUTPATIENT
Start: 2018-12-05 | End: 2019-12-10 | Stop reason: SDUPTHER

## 2018-12-05 RX ORDER — ACETAMINOPHEN 500 MG
500 TABLET ORAL
Qty: 30 TAB | Refills: 2 | Status: SHIPPED | OUTPATIENT
Start: 2018-12-05 | End: 2018-12-05 | Stop reason: SDUPTHER

## 2018-12-05 NOTE — PROGRESS NOTES
[facility]     Chief Complaint   Patient presents with    Cough    Nasal Congestion     Vitals:    12/05/18 0930   BP: 108/73   Pulse: 88   Resp: 17   Temp: 98.3 °F (36.8 °C)   TempSrc: Oral   SpO2: 100%   Weight: 174 lb (78.9 kg)   Height: 5' 11\" (1.803 m)          HPI: patient is here with his caregiver, he is complaining about cough and congestion for the last 4 days, no fever no chills, no nausea vomiting, no abdominal pain. He has not used any medication because now they need prescription for all over-the-counter medication at the facility where he stays. Past Medical History:   Diagnosis Date    [facility] an[facility] ([facility]     Depression 02/01/2005    recurrent episode, Major Depression    Psychotic disorder ([facility] 12/07/2007    Intellectual disability     Vertigo 03/17/2018     Past Surgical History:   Procedure Laterality Date    HX TRACHEOSTOMY N/A 1985     Social History     Tobacco Use    Smoking status: Never Smoker    Smokeless tobacco: Never Used   Substance Use Topics    Alcohol use: Yes       History reviewed. No pertinent family history. Review of Systems   Constitutional: Negative for chills, fever, malaise/fatigue and weight loss. HENT: Positive for congestion. Negative for ear discharge, ear pain, hearing loss, nosebleeds and sore throat. Eyes: Negative for blurred vision, double vision and discharge. Respiratory: Positive for cough and sputum production. Negative for shortness of breath and wheezing. Cardiovascular: Negative for chest pain, palpitations, claudication and leg swelling. Gastrointestinal: Negative for abdominal pain, blood in stool, constipation, diarrhea, heartburn, nausea and vomiting. Genitourinary: Negative for dysuria, frequency and urgency. Musculoskeletal: Negative for back pain, joint pain, myalgias and neck pain. Skin: Negative for itching and rash. Neurological: Positive for dizziness and headaches.  Negative for tingling, sensory change, speech change, focal weakness and weakness. Psychiatric/Behavioral: Negative for depression, hallucinations, substance abuse and suicidal ideas. Physical Exam   Constitutional: He is oriented to person, place, and time. He appears well-developed and well-nourished. No distress. HENT:   Head: Normocephalic and atraumatic. Mouth/Throat: No oropharyngeal exudate. Eyes: Conjunctivae are normal. Pupils are equal, round, and reactive to light. Right eye exhibits no discharge. Left eye exhibits no discharge. No scleral icterus. Neck: Normal range of motion. Neck supple. No thyromegaly present. Cardiovascular: Normal rate, regular rhythm and normal heart sounds. Pulmonary/Chest: Effort normal and breath sounds normal. No respiratory distress. He has no rales. Abdominal: Soft. Bowel sounds are normal. He exhibits no distension and no mass. There is no tenderness. There is no rebound. Musculoskeletal: Normal range of motion. He exhibits no edema, tenderness or deformity. Lymphadenopathy:     He has no cervical adenopathy. Neurological: He is alert and oriented to person, place, and time. No cranial nerve deficit. Coordination abnormal.   Skin: Skin is warm and dry. No rash noted. He is not diaphoretic. No erythema. Psychiatric: He has a normal mood and affect. Judgment and thought content normal.   Nursing note and vitals reviewed. Assessment and plan     Patient advised about increase water intake, vitamin C from natural resources like oranges and lemon. 1. URTI (acute upper respiratory infection)    - guaiFENesin ER (MUCINEX) 600 mg ER tablet; Take 1 Tab by mouth two (2) times a day. As needed for cough  Dispense: 20 Tab; Refill: 1  - acetaminophen (TYLENOL) 500 mg tablet; Take 1 Tab by mouth every six (6) hours as needed for Pain. And for fever or headaches  Dispense: 30 Tab; Refill: 2    2. Cough    - guaiFENesin ER (MUCINEX) 600 mg ER tablet;  Take 1 Tab by mouth two (2) times a day. As needed for cough  Dispense: 20 Tab; Refill: 1    3. Nasal congestion    - sodium chloride (OCEAN) 0.65 % nasal squeeze bottle; 0.05 mL by Both Nostrils route as needed for Congestion. Dispense: 104 mL; Refill: 1    4. Bacterial infection    - bacitracin zinc (BACITRACIN) ointment; Apply  to affected area two (2) times a day. As needed for simple superfecail wounds for 5 days  Dispense: 15 g; Refill: 1    Current Outpatient Medications   Medication Sig Dispense Refill    ARIPiprazole (ABILIFY) 2 mg tablet Take 2 mg by mouth daily.  multivitamin (ONE A DAY) tablet Take 1 Tab by mouth daily. 90 Tab 3    guaiFENesin ER (MUCINEX) 600 mg ER tablet Take 1 Tab by mouth two (2) times a day. As needed for cough 20 Tab 1    sodium chloride (OCEAN) 0.65 % nasal squeeze bottle 0.05 mL by Both Nostrils route as needed for Congestion. 104 mL 1    bacitracin zinc (BACITRACIN) ointment Apply  to affected area two (2) times a day. As needed for simple superfecail wounds for 5 days 15 g 1    acetaminophen (TYLENOL) 500 mg tablet Take 1 Tab by mouth every six (6) hours as needed for Pain. And for fever or headaches 30 Tab 2    meclizine (ANTIVERT) 12.5 mg tablet Take 1 Tab by mouth three (3) times daily as needed. For dizziness if blood pressure is normal 30 Tab 2    temazepam (RESTORIL) 30 mg capsule TK ONE C PO QHS  5    FLUoxetine (PROZAC) 20 mg capsule TK 1 C PO QD  5    ARIPiprazole (ABILIFY) 5 mg tablet Take 5 mg by mouth daily.       ARIPiprazole (ABILIFY) 10 mg tablet TK 1 T PO QAM  5       Patient Active Problem List    Diagnosis Date Noted    Anoxic brain damage ([facility] 06/13/2018    H/O traumatic brain injury 06/13/2018     Results for orders placed or performed during the hospital encounter of 05/29/18   LIPID PANEL   Result Value Ref Range    LIPID PROFILE          Cholesterol, total 162 <200 MG/DL    Triglyceride 122 <150 MG/DL    HDL Cholesterol 41 40 - 60 MG/DL    LDL, calculated 96.6 0 - 100 MG/DL    VLDL, calculated 24.4 MG/DL    CHOL/HDL Ratio 4.0 0 - 5.0       No visits with results within 3 Month(s) from this visit. Latest known visit with results is:   Hospital Outpatient Visit on 05/29/2018   Component Date Value Ref Range Status    LIPID PROFILE 05/29/2018        Final    Cholesterol, total 05/29/2018 162  <200 MG/DL Final    Triglyceride 05/29/2018 122  <150 MG/DL Final    Comment: The drugs N-acetylcysteine (NAC) and  Metamiszole have been found to cause falsely  low results in this chemical assay. Please  be sure to submit blood samples obtained  BEFORE administration of either of these  drugs to assure correct results.  HDL Cholesterol 05/29/2018 41  40 - 60 MG/DL Final    LDL, calculated 05/29/2018 96.6  0 - 100 MG/DL Final    VLDL, calculated 05/29/2018 24.4  MG/DL Final    CHOL/HDL Ratio 05/29/2018 4.0  0 - 5.0   Final          Follow-up Disposition:  Return if symptoms worsen or fail to improve.

## 2018-12-05 NOTE — PROGRESS NOTES
Wesley Marshall is a 51 y.o. male presents to office for cough and congestion  Medication list has been reviewed with Wesley Marshall and updated as of today's date     Health Maintenance items with a due date reviewed with patient:  There are no preventive care reminders to display for this patient.

## 2019-08-06 ENCOUNTER — OFFICE VISIT (OUTPATIENT)
Dept: FAMILY MEDICINE CLINIC | Age: 49
End: 2019-08-06

## 2019-08-06 VITALS
BODY MASS INDEX: 24.36 KG/M2 | OXYGEN SATURATION: 98 % | HEART RATE: 88 BPM | RESPIRATION RATE: 16 BRPM | TEMPERATURE: 97.6 F | WEIGHT: 174 LBS | SYSTOLIC BLOOD PRESSURE: 110 MMHG | DIASTOLIC BLOOD PRESSURE: 80 MMHG | HEIGHT: 71 IN

## 2019-08-06 DIAGNOSIS — R42 DIZZINESS: Primary | ICD-10-CM

## 2019-08-06 LAB — GLUCOSE POC: 87 MG/DL

## 2019-08-06 NOTE — PROGRESS NOTES
HISTORY OF PRESENT ILLNESS  Juju Hung is a 47 y.o. male here for evaluation of dizziness. Patient has been complaining of dizziness for the past month especially with standing up. It is noted that his blood pressure drops even lower with sitting down. He also complains of increased urination and thirst.  But sugar obtained today was 87. Dizziness    The history is provided by the patient. This is a new problem. The problem has not changed since onset. There was no loss of consciousness. The problem is associated with standing up. Associated symptoms include dizziness and light-headedness. Pertinent negatives include no visual change, no chest pain and no palpitations. He has tried nothing for the symptoms. His past medical history does not include no CVA, no TIA, no HTN or no syncope. No Known Allergies  Current Outpatient Medications on File Prior to Visit   Medication Sig Dispense Refill    ARIPiprazole (ABILIFY) 2 mg tablet Take 2 mg by mouth daily.  multivitamin (ONE A DAY) tablet Take 1 Tab by mouth daily. 90 Tab 3    guaiFENesin ER (MUCINEX) 600 mg ER tablet Take 1 Tab by mouth two (2) times a day. As needed for cough 20 Tab 1    sodium chloride (OCEAN) 0.65 % nasal squeeze bottle 0.05 mL by Both Nostrils route as needed for Congestion. 104 mL 1    bacitracin zinc (BACITRACIN) ointment Apply  to affected area two (2) times a day. As needed for simple superfecail wounds for 5 days 15 g 1    acetaminophen (TYLENOL) 500 mg tablet Take 1 Tab by mouth every six (6) hours as needed for Pain. And for fever or headaches 30 Tab 2    ARIPiprazole (ABILIFY) 5 mg tablet Take 5 mg by mouth daily.  meclizine (ANTIVERT) 12.5 mg tablet Take 1 Tab by mouth three (3) times daily as needed.  For dizziness if blood pressure is normal 30 Tab 2    temazepam (RESTORIL) 30 mg capsule TK ONE C PO QHS  5    FLUoxetine (PROZAC) 20 mg capsule TK 1 C PO QD  5    ARIPiprazole (ABILIFY) 10 mg tablet TK 1 T PO QAM  5     No current facility-administered medications on file prior to visit. Past Medical History:   Diagnosis Date    [facility] an[facility] injury ([facility]     Depression 02/01/2005    recurrent episode, Major Depression    Psychotic disorder ([facility] 12/07/2007    Intellectual disability     Vertigo 03/17/2018     Past Surgical History:   Procedure Laterality Date    HX TRACHEOSTOMY N/A 1985     No family history on file. Social History     Socioeconomic History    Marital status: SINGLE     Spouse name: Not on file    Number of children: Not on file    Years of education: Not on file    Highest education level: Not on file   Occupational History    Not on file   Social Needs    Financial resource strain: Not on file    Food insecurity:     Worry: Not on file     Inability: Not on file    Transportation needs:     Medical: Not on file     Non-medical: Not on file   Tobacco Use    Smoking status: Never Smoker    Smokeless tobacco: Never Used   Substance and Sexual Activity    Alcohol use: Yes    Drug use: No    Sexual activity: Never   Lifestyle    Physical activity:     Days per week: Not on file     Minutes per session: Not on file    Stress: Not on file   Relationships    Social connections:     Talks on phone: Not on file     Gets together: Not on file     Attends Shinto service: Not on file     Active member of club or organization: Not on file     Attends meetings of clubs or organizations: Not on file     Relationship status: Not on file    Intimate partner violence:     Fear of current or ex partner: Not on file     Emotionally abused: Not on file     Physically abused: Not on file     Forced sexual activity: Not on file   Other Topics Concern    Not on file   Social History Narrative    Not on file       Review of Systems   Constitutional: Negative. Eyes: Negative. Respiratory: Negative. Cardiovascular: Negative. Negative for chest pain and palpitations.    Musculoskeletal: Negative. Neurological: Positive for dizziness and light-headedness. Negative for syncope. Endo/Heme/Allergies: Positive for polydipsia. Psychiatric/Behavioral: Negative. Visit Vitals  /80 (BP 1 Location: Left arm, BP Patient Position: Standing)   Pulse 88   Temp 97.6 °F (36.4 °C) (Temporal)   Resp 16   Ht 5' 11\" (1.803 m)   Wt 174 lb (78.9 kg)   SpO2 98%   BMI 24.27 kg/m²         Physical Exam   Constitutional: He is oriented to person, place, and time. He appears well-developed and well-nourished. HENT:   Head: Normocephalic and atraumatic. Cardiovascular: Normal rate, regular rhythm, normal heart sounds and intact distal pulses. Exam reveals no gallop and no friction rub. No murmur heard. Pulmonary/Chest: Effort normal and breath sounds normal. No respiratory distress. He has no wheezes. He has no rales. Musculoskeletal: Normal range of motion. He exhibits no edema or tenderness. Neurological: He is alert and oriented to person, place, and time. No cranial nerve deficit. Coordination normal.   Skin: Skin is warm and dry. No rash noted. No erythema. No pallor. Psychiatric: He has a normal mood and affect. His behavior is normal. Thought content normal.   Nursing note and vitals reviewed. ASSESSMENT and PLAN    ICD-10-CM ICD-9-CM    1. Dizziness R42 780.4 AMB POC GLUCOSE BLOOD, BY GLUCOSE MONITORING DEVICE     Follow-up and Dispositions    · Return in about 1 month (around 9/3/2019) for Follow-up with Dr. Brigette Maza, Patient sent directly to the emergency room for fu.

## 2019-08-06 NOTE — PROGRESS NOTES
Marika Ferrer is a 47 y.o. male (: 1970) presenting to address:    Chief Complaint   Patient presents with    Dizziness       Vitals:    19 1740 19 1745 19 1750   BP: (!) 84/64 94/68 110/80   Pulse: 67 65 88   Resp: 16     Temp: 97.6 °F (36.4 °C)     TempSrc: Temporal     SpO2: 97% 98% 98%   Weight: 174 lb (78.9 kg)     Height: 5' 11\" (1.803 m)     PainSc:   0 - No pain         Hearing/Vision:   No exam data present    Learning Assessment:     Learning Assessment 3/13/2018   PRIMARY LEARNER Patient   HIGHEST LEVEL OF EDUCATION - PRIMARY LEARNER  GRADUATED HIGH SCHOOL OR GED   BARRIERS PRIMARY LEARNER COGNITIVE   CO-LEARNER CAREGIVER Yes   PRIMARY LANGUAGE ENGLISH   LEARNER PREFERENCE PRIMARY LISTENING     DEMONSTRATION     READING   ANSWERED BY patient   RELATIONSHIP SELF     Depression Screening:     3 most recent PHQ Screens 2018   PHQ Not Done Active Diagnosis of Depression or Bipolar Disorder   Little interest or pleasure in doing things -   Feeling down, depressed, irritable, or hopeless -   Total Score PHQ 2 -     Fall Risk Assessment:     Fall Risk Assessment, last 12 mths 2018   Able to walk? Yes   Fall in past 12 months? Yes   Fall with injury? No   Number of falls in past 12 months 2   Fall Risk Score 2     Abuse Screening:     Abuse Screening Questionnaire 2018   Do you ever feel afraid of your partner? N   Are you in a relationship with someone who physically or mentally threatens you? N   Is it safe for you to go home? Y     Coordination of Care Questionaire:   1. Have you been to the ER, urgent care clinic since your last visit? Hospitalized since your last visit? NO    2. Have you seen or consulted any other health care providers outside of the [address] [place] [address] since your last visit? Include any pap smears or colon screening.  NO

## 2019-09-10 ENCOUNTER — OFFICE VISIT (OUTPATIENT)
Dept: FAMILY MEDICINE CLINIC | Age: 49
End: 2019-09-10

## 2019-09-10 ENCOUNTER — HOSPITAL ENCOUNTER (OUTPATIENT)
Dept: LAB | Age: 49
Discharge: HOME | End: 2019-09-10
Payer: MEDICARE

## 2019-09-10 VITALS
SYSTOLIC BLOOD PRESSURE: 102 MMHG | TEMPERATURE: 97.2 F | WEIGHT: 169 LBS | DIASTOLIC BLOOD PRESSURE: 68 MMHG | HEART RATE: 76 BPM | HEIGHT: 71 IN | OXYGEN SATURATION: 96 % | BODY MASS INDEX: 23.66 KG/M2 | RESPIRATION RATE: 20 BRPM

## 2019-09-10 DIAGNOSIS — R42 DIZZINESS: Primary | ICD-10-CM

## 2019-09-10 DIAGNOSIS — R79.89 ABNORMAL TSH: ICD-10-CM

## 2019-09-10 DIAGNOSIS — R94.6 ABNORMAL RESULTS OF THYROID FUNCTION STUDIES: ICD-10-CM

## 2019-09-10 DIAGNOSIS — R35.0 URINE FREQUENCY: ICD-10-CM

## 2019-09-10 DIAGNOSIS — Z87.820 H/O TRAUMATIC BRAIN INJURY: ICD-10-CM

## 2019-09-10 DIAGNOSIS — R42 DIZZINESS: ICD-10-CM

## 2019-09-10 LAB
ALBUMIN SERPL-MCNC: 4.3 G/DL (ref 3.4–5)
ALBUMIN/GLOB SERPL: 1.4 {RATIO} (ref 0.8–1.7)
ALP SERPL-CCNC: 56 U/L (ref 45–117)
ALT SERPL-CCNC: 28 U/L (ref 16–61)
ANION GAP SERPL CALC-SCNC: 3 MMOL/L (ref 3–18)
AST SERPL-CCNC: 22 U/L (ref 10–38)
BASOPHILS # BLD: 0 K/UL (ref 0–0.1)
BASOPHILS NFR BLD: 0 % (ref 0–2)
BILIRUB SERPL-MCNC: 0.7 MG/DL (ref 0.2–1)
BUN SERPL-MCNC: 8 MG/DL (ref 7–18)
BUN/CREAT SERPL: 8 (ref 12–20)
CALCIUM SERPL-MCNC: 9.3 MG/DL (ref 8.5–10.1)
CHLORIDE SERPL-SCNC: 106 MMOL/L (ref 100–111)
CO2 SERPL-SCNC: 32 MMOL/L (ref 21–32)
CREAT SERPL-MCNC: 0.97 MG/DL (ref 0.6–1.3)
DIFFERENTIAL METHOD BLD: ABNORMAL
EOSINOPHIL # BLD: 0 K/UL (ref 0–0.4)
EOSINOPHIL NFR BLD: 1 % (ref 0–5)
ERYTHROCYTE [DISTWIDTH] IN BLOOD BY AUTOMATED COUNT: 12.9 % (ref 11.6–14.5)
GLOBULIN SER CALC-MCNC: 3.1 G/DL (ref 2–4)
GLUCOSE SERPL-MCNC: 95 MG/DL (ref 74–99)
HCT VFR BLD AUTO: 43 % (ref 36–48)
HGB BLD-MCNC: 14.4 G/DL (ref 13–16)
LYMPHOCYTES # BLD: 2 K/UL (ref 0.9–3.6)
LYMPHOCYTES NFR BLD: 39 % (ref 21–52)
MCH RBC QN AUTO: 31.4 PG (ref 24–34)
MCHC RBC AUTO-ENTMCNC: 33.5 G/DL (ref 31–37)
MCV RBC AUTO: 93.7 FL (ref 74–97)
MONOCYTES # BLD: 0.4 K/UL (ref 0.05–1.2)
MONOCYTES NFR BLD: 9 % (ref 3–10)
NEUTS SEG # BLD: 2.6 K/UL (ref 1.8–8)
NEUTS SEG NFR BLD: 51 % (ref 40–73)
PLATELET # BLD AUTO: 255 K/UL (ref 135–420)
PMV BLD AUTO: 9.3 FL (ref 9.2–11.8)
POTASSIUM SERPL-SCNC: 4.4 MMOL/L (ref 3.5–5.5)
PROT SERPL-MCNC: 7.4 G/DL (ref 6.4–8.2)
RBC # BLD AUTO: 4.59 M/UL (ref 4.7–5.5)
SODIUM SERPL-SCNC: 141 MMOL/L (ref 136–145)
T4 FREE SERPL-MCNC: 0.9 NG/DL (ref 0.7–1.5)
TSH SERPL DL<=0.05 MIU/L-ACNC: 3.31 UIU/ML (ref 0.36–3.74)
WBC # BLD AUTO: 5 K/UL (ref 4.6–13.2)

## 2019-09-10 PROCEDURE — 36415 COLL VENOUS BLD VENIPUNCTURE: CPT

## 2019-09-10 PROCEDURE — 80053 COMPREHEN METABOLIC PANEL: CPT

## 2019-09-10 PROCEDURE — 85025 COMPLETE CBC W/AUTO DIFF WBC: CPT

## 2019-09-10 PROCEDURE — 84439 ASSAY OF FREE THYROXINE: CPT

## 2019-09-10 NOTE — PROGRESS NOTES
Enoch Gleason     Chief Complaint   Patient presents with    Dizziness     f/u     Vitals:    09/10/19 1037   BP: 102/68   Pulse: 76   Resp: 20   Temp: 97.2 °F (36.2 °C)   TempSrc: Oral   SpO2: 96%   Weight: 169 lb (76.7 kg)   Height: 5' 11\" (1.803 m)   PainSc:   0 - No pain         HPI:  is here today with his direct care provider  Debra Melo , he need to be evaluated for dizziness that he has been having and especially at work in the morning, patient needs to have a lot of episode of dizziness that was investigated in the past he was seen by neurologist, and some of his psychiatric medication has been modified in the past.  And he had meclizine ordered as needed. Patient is not very good historian, but he states that he when he moves fast he gets dizziness and then had to sit down for a few minutes and then he was back to work, he works at [employer], there is no nausea no vomiting no blurred vision, patient ate breakfast every day before he goes to work which is cereal and orange juice. Patient has no other complaint    Patient does not get dizzy when he first get up in the morning from bed    Past Medical History:   Diagnosis Date    [facility] an[facility] ([facility]     Depression 02/01/2005    recurrent episode, Major Depression    [facility] 12/07/2007    Intellectual disability     Vertigo 03/17/2018     Past Surgical History:   Procedure Laterality Date    HX TRACHEOSTOMY N/A 1985     Social History     Tobacco Use    Smoking status: Never Smoker    Smokeless tobacco: Never Used   Substance Use Topics    Alcohol use: Yes       History reviewed. No pertinent family history. Review of Systems   Constitutional: Negative for chills, fever, malaise/fatigue and weight loss. HENT: Negative for congestion, ear discharge, ear pain, hearing loss and nosebleeds. Eyes: Negative for blurred vision, double vision and discharge. Respiratory: Negative for cough. Cardiovascular: Negative for chest pain, palpitations, claudication and leg swelling. Gastrointestinal: Negative for abdominal pain, constipation, diarrhea, nausea and vomiting. Genitourinary: Positive for frequency. Negative for dysuria and urgency. Musculoskeletal: Negative for back pain, joint pain, myalgias and neck pain. Skin: Negative for itching and rash. Neurological: Positive for dizziness. Negative for tingling, sensory change, speech change, focal weakness, weakness and headaches. Psychiatric/Behavioral: Negative for depression, hallucinations, substance abuse and suicidal ideas. The patient is not nervous/anxious. Physical Exam   Constitutional: He is oriented to person, place, and time. He appears well-developed and well-nourished. No distress. HENT:   Head: Normocephalic and atraumatic. Mouth/Throat: No oropharyngeal exudate. Eyes: Pupils are equal, round, and reactive to light. Conjunctivae are normal. Right eye exhibits no discharge. Left eye exhibits no discharge. No scleral icterus. Neck: Normal range of motion. Neck supple. No thyromegaly present. Cardiovascular: Normal rate, regular rhythm and normal heart sounds. Pulmonary/Chest: Effort normal and breath sounds normal. No respiratory distress. He has no rales. Abdominal: Soft. Bowel sounds are normal. He exhibits no distension and no mass. There is no tenderness. There is no rebound. Musculoskeletal: Normal range of motion. He exhibits no edema, tenderness or deformity. Lymphadenopathy:     He has no cervical adenopathy. Neurological: He is alert and oriented to person, place, and time. No cranial nerve deficit. Coordination normal.   Skin: Skin is warm and dry. No rash noted. He is not diaphoretic. No erythema. Psychiatric: He has a normal mood and affect. Judgment and thought content normal.   Nursing note and vitals reviewed.        Assessment and plan     Plan of care has been discussed with the patient, he agrees to the plan and verbalized understanding. All his questions were answered  More than 50% of the time spent in this visit was counseling the patient about  illness and treatment options         1. Dizziness  Patient electrolytes will be checked today as well as hemoglobin    I requested an order for blood pressure check daily at the facility that his living    As well as recording of his dizziness episodes      We will wait for blood work to be back if all is negative consider referral to physical therapy    - METABOLIC PANEL, COMPREHENSIVE; Future  - CBC WITH AUTOMATED DIFF; Future    2. H/O traumatic brain injury  Stable    3. Abnormal TSH    - TSH AND FREE T4; Future    4. Urine frequency    - URINALYSIS W/MICROSCOPIC; Future    5. Abnormal results of thyroid function studies     - TSH AND FREE T4; Future    Current Outpatient Medications   Medication Sig Dispense Refill    ARIPiprazole (ABILIFY) 2 mg tablet Take 2 mg by mouth daily.  multivitamin (ONE A DAY) tablet Take 1 Tab by mouth daily. 90 Tab 3    guaiFENesin ER (MUCINEX) 600 mg ER tablet Take 1 Tab by mouth two (2) times a day. As needed for cough 20 Tab 1    sodium chloride (OCEAN) 0.65 % nasal squeeze bottle 0.05 mL by Both Nostrils route as needed for Congestion. 104 mL 1    bacitracin zinc (BACITRACIN) ointment Apply  to affected area two (2) times a day. As needed for simple superfecail wounds for 5 days 15 g 1    acetaminophen (TYLENOL) 500 mg tablet Take 1 Tab by mouth every six (6) hours as needed for Pain. And for fever or headaches 30 Tab 2    meclizine (ANTIVERT) 12.5 mg tablet Take 1 Tab by mouth three (3) times daily as needed. For dizziness if blood pressure is normal 30 Tab 2    temazepam (RESTORIL) 30 mg capsule 15 mg.  5    FLUoxetine (PROZAC) 20 mg capsule TK 1 C PO QD  5    ARIPiprazole (ABILIFY) 5 mg tablet Take 5 mg by mouth daily.       ARIPiprazole (ABILIFY) 10 mg tablet TK 1 T PO QAM  5 Patient Active Problem List    Diagnosis Date Noted    Abnormal TSH 09/10/2019    Urine frequency 09/10/2019    Dizziness 08/06/2019    Anoxic brain damage ([facility] 06/13/2018    H/O traumatic brain injury 06/13/2018     Results for orders placed or performed in visit on 08/06/19   AMB POC GLUCOSE BLOOD, BY GLUCOSE MONITORING DEVICE   Result Value Ref Range    Glucose POC 87 mg/dL     Hospital Outpatient Visit on 09/10/2019   Component Date Value Ref Range Status    Sodium 09/10/2019 141  136 - 145 mmol/L Final    Potassium 09/10/2019 4.4  3.5 - 5.5 mmol/L Final    Chloride 09/10/2019 106  100 - 111 mmol/L Final    CO2 09/10/2019 32  21 - 32 mmol/L Final    Anion gap 09/10/2019 3  3.0 - 18 mmol/L Final    Glucose 09/10/2019 95  74 - 99 mg/dL Final    BUN 09/10/2019 8  7.0 - 18 MG/DL Final    Creatinine 09/10/2019 0.97  0.6 - 1.3 MG/DL Final    BUN/Creatinine ratio 09/10/2019 8* 12 - 20   Final    GFR est AA 09/10/2019 >60  >60 ml/min/1.73m2 Final    GFR est non-AA 09/10/2019 >60  >60 ml/min/1.73m2 Final    Comment: (NOTE)  Estimated GFR is calculated using the Modification of Diet in Renal   Disease (MDRD) Study equation, reported for both  Americans   (GFRAA) and non- Americans (GFRNA), and normalized to 1.73m2   body surface area. The physician must decide which value applies to   the patient. The MDRD study equation should only be used in   individuals age 25 or older. It has not been validated for the   following: pregnant women, patients with serious comorbid conditions,   or on certain medications, or persons with extremes of body size,   muscle mass, or nutritional status.  Calcium 09/10/2019 9.3  8.5 - 10.1 MG/DL Final    Bilirubin, total 09/10/2019 0.7  0.2 - 1.0 MG/DL Final    ALT (SGPT) 09/10/2019 28  16 - 61 U/L Final    AST (SGOT) 09/10/2019 22  10 - 38 U/L Final    Alk.  phosphatase 09/10/2019 56  45 - 117 U/L Final    Protein, total 09/10/2019 7.4  6.4 - 8.2 g/dL Final    Albumin 09/10/2019 4.3  3.4 - 5.0 g/dL Final    Globulin 09/10/2019 3.1  2.0 - 4.0 g/dL Final    A-G Ratio 09/10/2019 1.4  0.8 - 1.7   Final    WBC 09/10/2019 5.0  4.6 - 13.2 K/uL Final    RBC 09/10/2019 4.59* 4.70 - 5.50 M/uL Final    HGB 09/10/2019 14.4  13.0 - 16.0 g/dL Final    HCT 09/10/2019 43.0  36.0 - 48.0 % Final    MCV 09/10/2019 93.7  74.0 - 97.0 FL Final    MCH 09/10/2019 31.4  24.0 - 34.0 PG Final    MCHC 09/10/2019 33.5  31.0 - 37.0 g/dL Final    RDW 09/10/2019 12.9  11.6 - 14.5 % Final    PLATELET 45/42/6505 730  135 - 420 K/uL Final    MPV 09/10/2019 9.3  9.2 - 11.8 FL Final    NEUTROPHILS 09/10/2019 51  40 - 73 % Final    LYMPHOCYTES 09/10/2019 39  21 - 52 % Final    MONOCYTES 09/10/2019 9  3 - 10 % Final    EOSINOPHILS 09/10/2019 1  0 - 5 % Final    BASOPHILS 09/10/2019 0  0 - 2 % Final    ABS. NEUTROPHILS 09/10/2019 2.6  1.8 - 8.0 K/UL Final    ABS. LYMPHOCYTES 09/10/2019 2.0  0.9 - 3.6 K/UL Final    ABS. MONOCYTES 09/10/2019 0.4  0.05 - 1.2 K/UL Final    ABS. EOSINOPHILS 09/10/2019 0.0  0.0 - 0.4 K/UL Final    ABS. BASOPHILS 09/10/2019 0.0  0.0 - 0.1 K/UL Final    DF 09/10/2019 AUTOMATED    Final   Office Visit on 08/06/2019   Component Date Value Ref Range Status    Glucose POC 08/06/2019 87  mg/dL Final          Follow-up and Dispositions    · Return in about 1 month (around 10/10/2019).

## 2019-09-10 NOTE — PROGRESS NOTES
Jaquan Winslow is a 47 y.o. male presents in office for   Chief Complaint   Patient presents with    Dizziness     f/u         Health Maintenance Due   Topic Date Due    MEDICARE YEARLY EXAM  05/30/2019    FOBT Q 1 YEAR AGE 50-75  07/05/2019    Influenza Age 5 to Adult  08/01/2019       1. Have you been to the ER, urgent care clinic since your last visit? Hospitalized since your last visit? No    2. Have you seen or consulted any other health care providers outside of the [address] [place] [address] since your last visit? Include any pap smears or colon screening.  No

## 2019-09-11 ENCOUNTER — HOSPITAL ENCOUNTER (OUTPATIENT)
Dept: LAB | Age: 49
Discharge: HOME | End: 2019-09-11
Payer: MEDICARE

## 2019-09-11 DIAGNOSIS — R35.0 URINE FREQUENCY: ICD-10-CM

## 2019-09-11 LAB
APPEARANCE UR: CLEAR
BACTERIA URNS QL MICRO: NEGATIVE /HPF
BILIRUB UR QL: NEGATIVE
COLOR UR: YELLOW
EPITH CASTS URNS QL MICRO: NORMAL /LPF (ref 0–5)
GLUCOSE UR STRIP.AUTO-MCNC: NEGATIVE MG/DL
HGB UR QL STRIP: NEGATIVE
KETONES UR QL STRIP.AUTO: NEGATIVE MG/DL
LEUKOCYTE ESTERASE UR QL STRIP.AUTO: NEGATIVE
NITRITE UR QL STRIP.AUTO: NEGATIVE
PH UR STRIP: 5.5 [PH] (ref 5–8)
PROT UR STRIP-MCNC: NEGATIVE MG/DL
RBC #/AREA URNS HPF: 0 /HPF (ref 0–5)
SP GR UR REFRACTOMETRY: 1.01 (ref 1–1.03)
UROBILINOGEN UR QL STRIP.AUTO: 0.2 EU/DL (ref 0.2–1)
WBC URNS QL MICRO: NORMAL /HPF (ref 0–4)

## 2019-09-11 PROCEDURE — 81001 URINALYSIS AUTO W/SCOPE: CPT

## 2019-09-12 RX ORDER — FLUOXETINE 10 MG/1
20 CAPSULE ORAL
COMMUNITY
End: 2020-09-10 | Stop reason: SDUPTHER

## 2019-09-12 RX ORDER — TEMAZEPAM 30 MG/1
15 CAPSULE ORAL
COMMUNITY
End: 2020-11-17 | Stop reason: DRUGHIGH

## 2019-10-10 PROBLEM — R42 VERTIGO: Status: ACTIVE | Noted: 2018-03-15

## 2019-10-10 PROBLEM — R27.0 ATAXIA: Status: ACTIVE | Noted: 2018-03-15

## 2019-10-10 PROBLEM — G93.1 ANOXIC BRAIN INJURY (HCC): Status: ACTIVE | Noted: 2018-03-15

## 2019-10-11 ENCOUNTER — OFFICE VISIT (OUTPATIENT)
Dept: FAMILY MEDICINE CLINIC | Age: 49
End: 2019-10-11

## 2019-10-11 VITALS
OXYGEN SATURATION: 96 % | RESPIRATION RATE: 20 BRPM | BODY MASS INDEX: 23.52 KG/M2 | HEART RATE: 99 BPM | SYSTOLIC BLOOD PRESSURE: 102 MMHG | HEIGHT: 71 IN | WEIGHT: 168 LBS | DIASTOLIC BLOOD PRESSURE: 69 MMHG | TEMPERATURE: 97.4 F

## 2019-10-11 DIAGNOSIS — Z00.00 MEDICARE ANNUAL WELLNESS VISIT, SUBSEQUENT: ICD-10-CM

## 2019-10-11 DIAGNOSIS — Z23 ENCOUNTER FOR IMMUNIZATION: Primary | ICD-10-CM

## 2019-10-11 DIAGNOSIS — Z12.11 SCREENING FOR COLON CANCER: ICD-10-CM

## 2019-10-11 NOTE — PROGRESS NOTES
(AWV) The Initial Medicare Annual Wellness Exam PROGRESS NOTE    This is an Initial Medicare Annual Wellness Exam (AWV) (Performed 12 months after IPPE or effective date of Medicare Part B enrollment, Once in a lifetime)    I have reviewed the patient's medical history in detail and updated the computerized patient record. Lamin Claudio is a 47 y.o.  male and presents for an annual wellness exam     Patient is here accompanied today by his  Caregiver    Meaghan Guillen denied any complaint today    He is doing well  Exercising regularly, going  To the gym     Patient Active Problem List   Diagnosis Code    Anoxic brain damage ([facility] G93.1    H/O traumatic brain injury Z87.820    Dizziness R42    Abnormal TSH R79.89    Urine frequency R35.0    Ataxia R27.0    Cognitive disorder F09    CP (cerebral palsy) ([facility] G80.9    Insomnia, unspecified G47.00    Major depressive disorder F32.9    Anoxic brain injury ([facility] G93.1    Vertigo R42     Patient Active Problem List    Diagnosis Date Noted    Abnormal TSH 09/10/2019    Urine frequency 09/10/2019    Dizziness 08/06/2019    Anoxic brain damage ([facility] 06/13/2018    H/O traumatic brain injury 06/13/2018    Ataxia 03/15/2018    Anoxic brain injury ([facility] 03/15/2018    Vertigo 03/15/2018    Cognitive disorder 04/13/2016    Insomnia, unspecified 04/13/2016    Major depressive disorder 04/13/2016    CP (cerebral palsy) ([facility] 12/01/2011     Current Outpatient Medications   Medication Sig Dispense Refill    temazepam (RESTORIL) 30 mg capsule Take 15 mg by mouth.  ARIPiprazole (ABILIFY) 2 mg tablet Take 2 mg by mouth daily.  multivitamin (ONE A DAY) tablet Take 1 Tab by mouth daily. 90 Tab 3    sodium chloride (OCEAN) 0.65 % nasal squeeze bottle 0.05 mL by Both Nostrils route as needed for Congestion. 104 mL 1    meclizine (ANTIVERT) 12.5 mg tablet Take 1 Tab by mouth three (3) times daily as needed.  For dizziness if blood pressure is normal 30 Tab 2    FLUoxetine (PROZAC) 20 mg capsule TK 1 C PO QD  5    FLUoxetine (PROZAC) 10 mg capsule Take 20 mg by mouth.  guaiFENesin ER (MUCINEX) 600 mg ER tablet Take 1 Tab by mouth two (2) times a day. As needed for cough 20 Tab 1    bacitracin zinc (BACITRACIN) ointment Apply  to affected area two (2) times a day. As needed for simple superfecail wounds for 5 days 15 g 1    acetaminophen (TYLENOL) 500 mg tablet Take 1 Tab by mouth every six (6) hours as needed for Pain. And for fever or headaches 30 Tab 2    ARIPiprazole (ABILIFY) 5 mg tablet Take 5 mg by mouth daily.  temazepam (RESTORIL) 30 mg capsule 15 mg.  5    ARIPiprazole (ABILIFY) 10 mg tablet TK 1 T PO QAM  5     No Known Allergies  Past Medical History:   Diagnosis Date    [facility] an[facility] injury ([facility]     Depression 02/01/2005    recurrent episode, Major Depression    Psychotic disorder ([facility] 12/07/2007    Intellectual disability     Vertigo 03/17/2018     Past Surgical History:   Procedure Laterality Date    HX TRACHEOSTOMY N/A 1985     History reviewed. No pertinent family history. Social History     Tobacco Use    Smoking status: Never Smoker    Smokeless tobacco: Never Used   Substance Use Topics    Alcohol use: Yes       ROS   History obtained from the patient  General ROS: negative for - chills, fatigue or fever  Psychological ROS: negative for - anxiety or depression  Respiratory ROS: no cough, shortness of breath, or wheezing  Cardiovascular ROS: no chest pain or dyspnea on exertion  Gastrointestinal ROS: no abdominal pain, change in bowel habits, or black or bloody stools  Genito-Urinary ROS: no dysuria, trouble voiding, or hematuria  Musculoskeletal ROS: negative for - joint swelling or muscle pain  Neurological ROS: no TIA or stroke symptoms  Dermatological ROS: positive for - mole changes    All other systems reviewed and are negative.     History     Past Medical History:   Diagnosis Date    Brain anoxic injury ([facility]     Depression 02/01/2005    recurrent episode, Major Depression    Psychotic disorder ([facility] 12/07/2007    Intellectual disability     Vertigo 03/17/2018      Past Surgical History:   Procedure Laterality Date    HX TRACHEOSTOMY N/A 1985     Current Outpatient Medications   Medication Sig Dispense Refill    temazepam (RESTORIL) 30 mg capsule Take 15 mg by mouth.  ARIPiprazole (ABILIFY) 2 mg tablet Take 2 mg by mouth daily.  multivitamin (ONE A DAY) tablet Take 1 Tab by mouth daily. 90 Tab 3    sodium chloride (OCEAN) 0.65 % nasal squeeze bottle 0.05 mL by Both Nostrils route as needed for Congestion. 104 mL 1    meclizine (ANTIVERT) 12.5 mg tablet Take 1 Tab by mouth three (3) times daily as needed. For dizziness if blood pressure is normal 30 Tab 2    FLUoxetine (PROZAC) 20 mg capsule TK 1 C PO QD  5    FLUoxetine (PROZAC) 10 mg capsule Take 20 mg by mouth.  guaiFENesin ER (MUCINEX) 600 mg ER tablet Take 1 Tab by mouth two (2) times a day. As needed for cough 20 Tab 1    bacitracin zinc (BACITRACIN) ointment Apply  to affected area two (2) times a day. As needed for simple superfecail wounds for 5 days 15 g 1    acetaminophen (TYLENOL) 500 mg tablet Take 1 Tab by mouth every six (6) hours as needed for Pain. And for fever or headaches 30 Tab 2    ARIPiprazole (ABILIFY) 5 mg tablet Take 5 mg by mouth daily.  temazepam (RESTORIL) 30 mg capsule 15 mg.  5    ARIPiprazole (ABILIFY) 10 mg tablet TK 1 T PO QAM  5     No Known Allergies  History reviewed. No pertinent family history.   Social History     Tobacco Use    Smoking status: Never Smoker    Smokeless tobacco: Never Used   Substance Use Topics    Alcohol use: Yes     Patient Active Problem List   Diagnosis Code    Anoxic brain damage ([facility] G93.1    H/O traumatic brain injury Z87.820    Dizziness R42    Abnormal TSH R79.89    Urine frequency R35.0    Ataxia R27.0    Cognitive disorder F09    CP (cerebral palsy) ([facility] G80.9    Insomnia, unspecified G47.00    Major depressive disorder F32.9    Anoxic brain injury ([facility] G93.1    Vertigo R42       Health Maintenance History  Immunizations reviewed, up to date   Flu vaccine today   Colonoscopy: refused  But will do stool  Test   Chest CT: never smoked   Eye exam: need  To schedule eye exam     He received flu vaccine today, patient no side effects      10/11/2019 Given 0.5 mL LD IM [id number] FLULAVAL QUAD 2019           Depression Risk Factor Screening:      Patient Health Questionnaire (PHQ-2)   Over the last 2 weeks, how often have you been bothered by any of the following problems? · Little interest or pleasure in doing things? · Not at all. [0]  · Feeling down, depressed, or hopeless? · Not at all. [0]    Total Score: 0/6  PHQ-2 Assessment Scoring:   A score of 2 or more requires further screening with the PHQ-9    Alcohol Risk Factor Screening:     Women: On any occasion during the past 3 months, have you had more than 3 drinks containing alcohol? Do you average more than 7 drinks per week? Men: On any occasion during the past 3 months, have you had more than 4 drinks containing alcohol? Do you average more than 14 drinks per week? Functional Ability and Level of Safety:     Hearing Loss    Hearing is good. Activities of Daily Living   Partial assistance. Requires assistance with: no ADLs    Fall Risk   No fall risk factors    Abuse Screen   Patient is not abused  None    Examination   Physical Examination  Vitals:    10/11/19 1439   BP: 102/69   Pulse: 99   Resp: 20   Temp: 97.4 °F (36.3 °C)   TempSrc: Oral   SpO2: 96%   Weight: 168 lb (76.2 kg)   Height: 5' 11\" (1.803 m)   PainSc:   0 - No pain      Body mass index is 23.43 kg/m².      Evaluation of Cognitive Function:  Mood/affect: he is happy today   Appearance:well groomed   Family member/caregiver input:  his behavioral specialist     alert, well appearing, and in no distress, oriented to person, place, and time, normal appearing weight and well hydrated    Patient Care Team:  Trevor Reed MD as PCP - General (Internal Medicine)    End-of-life planning  Advanced Directive in the case than an injury or illness causes the patient to be unable to make health care decisions    Health Care Directive or Living Will: no    Advice/Referrals/Counselling/Plan:   Education and counseling provided:  Are appropriate based on today's review and evaluation  Influenza Vaccine  Colorectal cancer screening tests  Screening for glaucoma  Include in education list (weight loss, physical activity, smoking cessation, fall prevention, and nutrition)    ICD-10-CM ICD-9-CM    1. Encounter for immunization Z23 V03.89 INFLUENZA VIRUS VAC QUAD,SPLIT,PRESV FREE SYRINGE IM      IL IMMUNIZ ADMIN,1 SINGLE/COMB VAC/TOXOID   2. Screening for colon cancer Z12.11 V76.51 COLOGUARD TEST (FECAL DNA COLORECTAL CANCER SCREENING)     Encounter Diagnoses   Name Primary?  Encounter for immunization Yes    Screening for colon cancer      Orders Placed This Encounter    Influenza virus vaccine (QUADRIVALENT PRES FREE SYRINGE) IM ([id number])    COLOGUARD TEST (FECAL DNA COLORECTAL CANCER SCREENING)     Orders Placed This Encounter    Influenza virus vaccine (QUADRIVALENT PRES FREE SYRINGE) IM ([id number])    COLOGUARD TEST (FECAL DNA COLORECTAL CANCER SCREENING)    IL IMMUNIZ ADMIN,1 SINGLE/COMB VAC/TOXOID     Orders Placed This Encounter    Influenza virus vaccine (QUADRIVALENT PRES FREE SYRINGE) IM ([id number])    COLOGUARD TEST (FECAL DNA COLORECTAL CANCER SCREENING)    IL IMMUNIZ ADMIN,1 SINGLE/COMB VAC/TOXOID     Diagnoses and all orders for this visit:    1. Encounter for immunization  -     INFLUENZA VIRUS VAC QUAD,SPLIT,PRESV FREE SYRINGE IM  -     IL IMMUNIZ ADMIN,1 SINGLE/COMB VAC/TOXOID    2.  Screening for colon cancer  -     COLOGUARD TEST (FECAL DNA COLORECTAL CANCER SCREENING)      current treatment plan is effective, no change in therapy  reviewed diet, exercise and weight control. Brief written plan, checklist    I have discussed the diagnosis with the patient and the intended plan as seen in the above orders. The patient has received an after-visit summary and questions were answered concerning future plans. I have discussed medication side effects and warnings with the patient as well. I have reviewed the plan of care with the patient, accepted their input and they are in agreement with the treatment goals. ____________________________________________________________    Problem Assessment    for treatment of   Chief Complaint   Patient presents with    Physical           Visit Vitals  /69 (BP 1 Location: Left arm, BP Patient Position: Sitting)   Pulse 99   Temp 97.4 °F (36.3 °C) (Oral)   Resp 20   Ht 5' 11\" (1.803 m)   Wt 168 lb (76.2 kg)   SpO2 96%   BMI 23.43 kg/m²     General appearance: alert, cooperative, no distress, appears stated age  Head: Normocephalic, without obvious abnormality, atraumatic, sinuses nontender to percussion  Throat: normal findings: lips normal without lesions  Neck: supple, symmetrical, trachea midline and no adenopathy  Lungs: clear to auscultation bilaterally  Heart: S1, S2 normal  Abdomen: soft, non-tender.  Bowel sounds normal. No masses,  no organomegaly  Extremities: extremities normal, atraumatic, no cyanosis or edema  Skin: Skin color, texture, turgor normal. No rashes or lesions      \

## 2019-10-11 NOTE — PROGRESS NOTES
Kendra Hernandez is a 47 y.o. male presents in office for a physical.    Medication list has been reviewed with patient during office visit today. Health maintenance with due date reviewed with patient. Health Maintenance Due   Topic Date Due    MEDICARE YEARLY EXAM  05/30/2019    FOBT Q 1 YEAR AGE 50-75  07/05/2019    Influenza Age 5 to Adult  08/01/2019     Immunization/s administered 10/11/2019 by Jigna Mendoza with consent. Patient tolerated procedure well. No reactions noted. 1. Have you been to the ER, urgent care clinic since your last visit? Hospitalized since your last visit? No    2. Have you seen or consulted any other health care providers outside of the [address] [place] [address] since your last visit? Include any pap smears or colon screening.  No

## 2019-11-04 ENCOUNTER — OFFICE VISIT (OUTPATIENT)
Dept: FAMILY MEDICINE CLINIC | Age: 49
End: 2019-11-04

## 2019-11-04 ENCOUNTER — HOSPITAL ENCOUNTER (OUTPATIENT)
Dept: LAB | Age: 49
Discharge: HOME | End: 2019-11-04
Payer: MEDICARE

## 2019-11-04 VITALS
HEIGHT: 71 IN | TEMPERATURE: 98 F | SYSTOLIC BLOOD PRESSURE: 107 MMHG | BODY MASS INDEX: 23.44 KG/M2 | DIASTOLIC BLOOD PRESSURE: 69 MMHG | RESPIRATION RATE: 20 BRPM | OXYGEN SATURATION: 94 % | WEIGHT: 167.4 LBS | HEART RATE: 71 BPM

## 2019-11-04 DIAGNOSIS — L60.8 TOENAIL DEFORMITY: Primary | ICD-10-CM

## 2019-11-04 DIAGNOSIS — L60.2 OVERGROWN TOENAILS: ICD-10-CM

## 2019-11-04 DIAGNOSIS — L60.3 NAIL BRITTLENESS: ICD-10-CM

## 2019-11-04 DIAGNOSIS — B35.1 ONYCHOMYCOSIS: ICD-10-CM

## 2019-11-04 LAB
KOH PREP SPEC: NORMAL
SERVICE CMNT-IMP: NORMAL

## 2019-11-04 PROCEDURE — 87220 TISSUE EXAM FOR FUNGI: CPT

## 2019-11-04 NOTE — PROGRESS NOTES
Domonique Aj presents today for   Chief Complaint   Patient presents with    Nail Problem       Is someone accompanying this pt? Yes, caregiver    Is the patient using any DME equipment during [address]? no    Depression Screening:  3 most recent PHQ Screens 9/10/2019   PHQ Not Done -   Little interest or pleasure in doing things Not at all   Feeling down, depressed, irritable, or hopeless Not at all   Total Score PHQ 2 0       Learning Assessment:  Learning Assessment 9/10/2019   PRIMARY LEARNER Patient   HIGHEST LEVEL OF EDUCATION - PRIMARY LEARNER  -   BARRIERS PRIMARY LEARNER -   CO-LEARNER CAREGIVER -   PRIMARY LANGUAGE ENGLISH   LEARNER PREFERENCE PRIMARY READING     LISTENING     VIDEOS   ANSWERED BY patient   RELATIONSHIP SELF       Abuse Screening:  Abuse Screening Questionnaire 9/10/2019   Do you ever feel afraid of your partner? N   Are you in a relationship with someone who physically or mentally threatens you? N   Is it safe for you to go home? Y       Fall Risk  Fall Risk Assessment, last 12 mths 9/10/2019   Able to walk? No   Fall in past 12 months? -   Fall with injury? -   Number of falls in past 12 months -   Fall Risk Score -       Health Maintenance reviewed and discussed and ordered per Provider. Health Maintenance Due   Topic Date Due    FOBT Q 1 YEAR AGE 50-75  07/05/2019   . Patient was ordered Cologuard at last visit (10-11-19) but has not completed yet. Pt currently taking Antiplatelet therapy? no    Coordination of Care:  1. Have you been to the ER, urgent care clinic since your last visit? Hospitalized since your last visit? no    2. Have you seen or consulted any other health care providers outside of the [address] [place] [address] since your last visit? Include any pap smears or colon screening.  no

## 2019-11-04 NOTE — PROGRESS NOTES
Kelsie Joseph     Chief Complaint   Patient presents with    Nail Problem     Vitals:    11/04/19 1345   BP: 107/69   Pulse: 71   Resp: 20   Temp: 98 °F (36.7 °C)   TempSrc: Oral   SpO2: 94%   Weight: 167 lb 6.4 oz (75.9 kg)   Height: 5' 11\" (1.803 m)   PainSc:   0 - No pain         HPI:He is here with Direct care provider Shawn Antonio for overgrowth of toenails  Of ibrahim  Feet       The big toenail in the left foot is loose  And causing him discomfort,no fever no chills no swelling no skin changes      Past Medical History:   Diagnosis Date    Brain anoxic injury ([facility]     Depression 02/01/2005    recurrent episode, Major Depression    Psychotic disorder ([facility] 12/07/2007    Intellectual disability     Vertigo 03/17/2018     Past Surgical History:   Procedure Laterality Date    HX TRACHEOSTOMY N/A 1985     Social History     Tobacco Use    Smoking status: Never Smoker    Smokeless tobacco: Never Used   Substance Use Topics    Alcohol use: Yes       History reviewed. No pertinent family history. Review of Systems   Constitutional: Negative for chills, fever, malaise/fatigue and weight loss. HENT: Negative for congestion, ear discharge, ear pain, hearing loss, nosebleeds and sinus pain. Eyes: Negative for blurred vision, double vision and discharge. Respiratory: Negative for cough. Cardiovascular: Negative for chest pain, palpitations, claudication and leg swelling. Gastrointestinal: Negative for abdominal pain, constipation, diarrhea, nausea and vomiting. Genitourinary: Negative for dysuria, frequency and urgency. Musculoskeletal: Negative for myalgias. Skin: Negative for itching and rash. Over growth of toenails   Neurological: Negative for dizziness, tingling, sensory change, speech change, focal weakness, weakness and headaches. Physical Exam   Constitutional: He is oriented to person, place, and time. He appears well-developed and well-nourished. No distress. HENT:   Head: Normocephalic and atraumatic. Mouth/Throat: No oropharyngeal exudate. Eyes: Pupils are equal, round, and reactive to light. Conjunctivae are normal. Right eye exhibits no discharge. Left eye exhibits no discharge. No scleral icterus. Neck: Normal range of motion. Neck supple. Cardiovascular: Normal rate and regular rhythm. Pulmonary/Chest: Effort normal.   Abdominal: Soft. Bowel sounds are normal.   Musculoskeletal: Normal range of motion. He exhibits no edema, tenderness or deformity. Neurological: He is alert and oriented to person, place, and time. Skin: Skin is warm and dry. No rash noted. He is not diaphoretic. No erythema. Patent has overgrowth  In all toenail    And they look brittle ,using the clipper overgrowth of the nail was cut, toenails are thick and brittle, toenail left toenail was loose on the way near to the growth plate, it was clipped except for the part adjacent to the growthplate   Psychiatric: He has a normal mood and affect. Judgment and thought content normal.   Nursing note and vitals reviewed. Assessment and plan     Plan of care has been discussed with the patient, he agrees to the plan and verbalized understanding. All his questions were answered  More than 50% of the time spent in this visit was counseling the patient about  illness and treatment options         1. Toenail deformity    - REFERRAL TO PODIATRY    2. Overgrown toenails    - KOH, HAIR, SKIN, NAILS; Future  - REFERRAL TO PODIATRY    3. Nail brittleness    - MIKE, HAIR, SKIN, NAILS; Future  - REFERRAL TO PODIATRY    Current Outpatient Medications   Medication Sig Dispense Refill    temazepam (RESTORIL) 30 mg capsule Take 15 mg by mouth nightly.  ARIPiprazole (ABILIFY) 2 mg tablet Take 2 mg by mouth daily.  multivitamin (ONE A DAY) tablet Take 1 Tab by mouth daily. 90 Tab 3    meclizine (ANTIVERT) 12.5 mg tablet Take 1 Tab by mouth three (3) times daily as needed.  For dizziness if blood pressure is normal 30 Tab 2    FLUoxetine (PROZAC) 20 mg capsule TK 1 C PO QD  5    FLUoxetine (PROZAC) 10 mg capsule Take 20 mg by mouth.  guaiFENesin ER (MUCINEX) 600 mg ER tablet Take 1 Tab by mouth two (2) times a day. As needed for cough 20 Tab 1    sodium chloride (OCEAN) 0.65 % nasal squeeze bottle 0.05 mL by Both Nostrils route as needed for Congestion. 104 mL 1    bacitracin zinc (BACITRACIN) ointment Apply  to affected area two (2) times a day. As needed for simple superfecail wounds for 5 days 15 g 1    acetaminophen (TYLENOL) 500 mg tablet Take 1 Tab by mouth every six (6) hours as needed for Pain. And for fever or headaches 30 Tab 2    ARIPiprazole (ABILIFY) 5 mg tablet Take 5 mg by mouth daily.       temazepam (RESTORIL) 30 mg capsule 15 mg.  5    ARIPiprazole (ABILIFY) 10 mg tablet TK 1 T PO QAM  5       Patient Active Problem List    Diagnosis Date Noted    Abnormal TSH 09/10/2019    Urine frequency 09/10/2019    Dizziness 08/06/2019    Anoxic brain damage ([facility] 06/13/2018    H/O traumatic brain injury 06/13/2018    Ataxia 03/15/2018    Anoxic brain injury ([facility] 03/15/2018    Vertigo 03/15/2018    Cognitive disorder 04/13/2016    Insomnia, unspecified 04/13/2016    Major depressive disorder 04/13/2016    CP (cerebral palsy) ([facility] 12/01/2011     Results for orders placed or performed during the hospital encounter of 09/11/19   URINALYSIS W/MICROSCOPIC   Result Value Ref Range    Color YELLOW      Appearance CLEAR      Specific gravity 1.010 1.005 - 1.030      pH (UA) 5.5 5.0 - 8.0      Protein NEGATIVE  NEG mg/dL    Glucose NEGATIVE  NEG mg/dL    Ketone NEGATIVE  NEG mg/dL    Bilirubin NEGATIVE  NEG      Blood NEGATIVE  NEG      Urobilinogen 0.2 0.2 - 1.0 EU/dL    Nitrites NEGATIVE  NEG      Leukocyte Esterase NEGATIVE  NEG      WBC 0 to 1 0 - 4 /hpf    RBC 0 0 - 5 /hpf    Epithelial cells FEW 0 - 5 /lpf    Bacteria NEGATIVE  NEG [facility] Outpatient Visit on 09/11/2019   Component Date Value Ref Range Status    Color 09/11/2019 YELLOW    Final    Appearance 09/11/2019 CLEAR    Final    Specific gravity 09/11/2019 1.010  1.005 - 1.030   Final    pH (UA) 09/11/2019 5.5  5.0 - 8.0   Final    Protein 09/11/2019 NEGATIVE   NEG mg/dL Final    Glucose 09/11/2019 NEGATIVE   NEG mg/dL Final    Ketone 09/11/2019 NEGATIVE   NEG mg/dL Final    Bilirubin 09/11/2019 NEGATIVE   NEG   Final    Blood 09/11/2019 NEGATIVE   NEG   Final    Urobilinogen 09/11/2019 0.2  0.2 - 1.0 EU/dL Final    Nitrites 09/11/2019 NEGATIVE   NEG   Final    Leukocyte Esterase 09/11/2019 NEGATIVE   NEG   Final    WBC 09/11/2019 0 to 1  0 - 4 /hpf Final    RBC 09/11/2019 0  0 - 5 /hpf Final    Epithelial cells 09/11/2019 FEW  0 - 5 /lpf Final    Bacteria 09/11/2019 NEGATIVE   NEG /hpf Final   Hospital Outpatient Visit on 09/10/2019   Component Date Value Ref Range Status    Sodium 09/10/2019 141  136 - 145 mmol/L Final    Potassium 09/10/2019 4.4  3.5 - 5.5 mmol/L Final    Chloride 09/10/2019 106  100 - 111 mmol/L Final    CO2 09/10/2019 32  21 - 32 mmol/L Final    Anion gap 09/10/2019 3  3.0 - 18 mmol/L Final    Glucose 09/10/2019 95  74 - 99 mg/dL Final    BUN 09/10/2019 8  7.0 - 18 MG/DL Final    Creatinine 09/10/2019 0.97  0.6 - 1.3 MG/DL Final    BUN/Creatinine ratio 09/10/2019 8* 12 - 20   Final    GFR est AA 09/10/2019 >60  >60 ml/min/1.73m2 Final    GFR est non-AA 09/10/2019 >60  >60 ml/min/1.73m2 Final    Comment: (NOTE)  Estimated GFR is calculated using the Modification of Diet in Renal   Disease (MDRD) Study equation, reported for both  Americans   (GFRAA) and non- Americans (GFRNA), and normalized to 1.73m2   body surface area. The physician must decide which value applies to   the patient. The MDRD study equation should only be used in   individuals age 25 or older.  It has not been validated for the   following: pregnant women, patients with serious comorbid conditions,   or on certain medications, or persons with extremes of body size,   muscle mass, or nutritional status.  Calcium 09/10/2019 9.3  8.5 - 10.1 MG/DL Final    Bilirubin, total 09/10/2019 0.7  0.2 - 1.0 MG/DL Final    ALT (SGPT) 09/10/2019 28  16 - 61 U/L Final    AST (SGOT) 09/10/2019 22  10 - 38 U/L Final    Alk. phosphatase 09/10/2019 56  45 - 117 U/L Final    Protein, total 09/10/2019 7.4  6.4 - 8.2 g/dL Final    Albumin 09/10/2019 4.3  3.4 - 5.0 g/dL Final    Globulin 09/10/2019 3.1  2.0 - 4.0 g/dL Final    A-G Ratio 09/10/2019 1.4  0.8 - 1.7   Final    WBC 09/10/2019 5.0  4.6 - 13.2 K/uL Final    RBC 09/10/2019 4.59* 4.70 - 5.50 M/uL Final    HGB 09/10/2019 14.4  13.0 - 16.0 g/dL Final    HCT 09/10/2019 43.0  36.0 - 48.0 % Final    MCV 09/10/2019 93.7  74.0 - 97.0 FL Final    MCH 09/10/2019 31.4  24.0 - 34.0 PG Final    MCHC 09/10/2019 33.5  31.0 - 37.0 g/dL Final    RDW 09/10/2019 12.9  11.6 - 14.5 % Final    PLATELET 98/99/8291 040  135 - 420 K/uL Final    MPV 09/10/2019 9.3  9.2 - 11.8 FL Final    NEUTROPHILS 09/10/2019 51  40 - 73 % Final    LYMPHOCYTES 09/10/2019 39  21 - 52 % Final    MONOCYTES 09/10/2019 9  3 - 10 % Final    EOSINOPHILS 09/10/2019 1  0 - 5 % Final    BASOPHILS 09/10/2019 0  0 - 2 % Final    ABS. NEUTROPHILS 09/10/2019 2.6  1.8 - 8.0 K/UL Final    ABS. LYMPHOCYTES 09/10/2019 2.0  0.9 - 3.6 K/UL Final    ABS. MONOCYTES 09/10/2019 0.4  0.05 - 1.2 K/UL Final    ABS. EOSINOPHILS 09/10/2019 0.0  0.0 - 0.4 K/UL Final    ABS. BASOPHILS 09/10/2019 0.0  0.0 - 0.1 K/UL Final    DF 09/10/2019 AUTOMATED    Final    TSH 09/10/2019 3.31  0.36 - 3.74 uIU/mL Final    T4, Free 09/10/2019 0.9  0.7 - 1.5 NG/DL Final   Office Visit on 08/06/2019   Component Date Value Ref Range Status    Glucose POC 08/06/2019 87  mg/dL Final          Follow-up and Dispositions    · Return if symptoms worsen or fail to improve, for as needed.

## 2019-11-08 ENCOUNTER — TELEPHONE (OUTPATIENT)
Dept: FAMILY MEDICINE CLINIC | Age: 49
End: 2019-11-08

## 2019-11-08 RX ORDER — TERBINAFINE HYDROCHLORIDE 250 MG/1
250 TABLET ORAL DAILY
Qty: 30 TAB | Refills: 2 | Status: SHIPPED | OUTPATIENT
Start: 2019-11-08 | End: 2020-09-10

## 2019-11-08 NOTE — TELEPHONE ENCOUNTER
[address] contacted with results per PCP, verified 2 patient identifiers. She has no further questions or concerns at this time. She will have patient's care giver contact our office to schedule 2 month follow up. Lamisil already sent to pharmacy.

## 2019-11-08 NOTE — TELEPHONE ENCOUNTER
----- Message from Reshma Freeman MD sent at 11/8/2019  8:16 AM EST -----  Results of the nail sample showed signs of fungal infection, to start Lamisil 250 mg daily for 3 months and then reevaluate, patient is still need to keep appointment podiatrist, and to follow-up here in 2 month

## 2019-12-04 RX ORDER — BISMUTH SUBSALICYLATE 262 MG
1 TABLET,CHEWABLE ORAL DAILY
Qty: 90 TAB | Refills: 3 | OUTPATIENT
Start: 2019-12-04

## 2019-12-10 DIAGNOSIS — R42 DIZZINESS: ICD-10-CM

## 2019-12-10 DIAGNOSIS — J06.9 URTI (ACUTE UPPER RESPIRATORY INFECTION): ICD-10-CM

## 2019-12-10 DIAGNOSIS — R09.81 NASAL CONGESTION: ICD-10-CM

## 2019-12-10 RX ORDER — ACETAMINOPHEN 500 MG
500 TABLET ORAL
Qty: 30 TAB | Refills: 2 | Status: SHIPPED | OUTPATIENT
Start: 2019-12-10 | End: 2019-12-17 | Stop reason: SDUPTHER

## 2019-12-10 RX ORDER — MECLIZINE HCL 12.5 MG 12.5 MG/1
12.5 TABLET ORAL
Qty: 30 TAB | Refills: 2 | Status: SHIPPED | OUTPATIENT
Start: 2019-12-10 | End: 2019-12-17 | Stop reason: SDUPTHER

## 2019-12-10 NOTE — TELEPHONE ENCOUNTER
Pt calling to request med refill of:  Requested Prescriptions     Pending Prescriptions Disp Refills    meclizine (ANTIVERT) 12.5 mg tablet 30 Tab 2     Sig: Take 1 Tab by mouth three (3) times daily as needed. For dizziness if blood pressure is normal    sodium chloride (OCEAN) 0.65 % nasal squeeze bottle 104 mL 1     Si.05 mL by Both Nostrils route as needed for Congestion.  acetaminophen (TYLENOL) 500 mg tablet 30 Tab 2     Sig: Take 1 Tab by mouth every six (6) hours as needed for Pain. And for fever or headaches  Indications: pain       Be sent to [facility]    Pt has 0 tabs remaining Medication is     Pts last appt was 19 & no f/u scheduled     Pt advised of 72 hour time frame. Please advise.

## 2019-12-17 DIAGNOSIS — J06.9 URTI (ACUTE UPPER RESPIRATORY INFECTION): ICD-10-CM

## 2019-12-17 DIAGNOSIS — A49.9 BACTERIAL INFECTION: ICD-10-CM

## 2019-12-17 DIAGNOSIS — R42 DIZZINESS: ICD-10-CM

## 2019-12-17 DIAGNOSIS — R05.9 COUGH: ICD-10-CM

## 2019-12-17 RX ORDER — BACITRACIN ZINC 500 UNIT/G
OINTMENT (GRAM) TOPICAL 2 TIMES DAILY
Qty: 15 G | Refills: 1 | OUTPATIENT
Start: 2019-12-17

## 2019-12-17 RX ORDER — ACETAMINOPHEN 500 MG
500 TABLET ORAL
Qty: 30 TAB | Refills: 2 | Status: SHIPPED | OUTPATIENT
Start: 2019-12-17 | End: 2020-11-17 | Stop reason: SDUPTHER

## 2019-12-17 RX ORDER — GUAIFENESIN 600 MG/1
600 TABLET, EXTENDED RELEASE ORAL 2 TIMES DAILY
Qty: 20 TAB | Refills: 1 | Status: SHIPPED | OUTPATIENT
Start: 2019-12-17 | End: 2020-11-17 | Stop reason: SDUPTHER

## 2019-12-17 RX ORDER — BISMUTH SUBSALICYLATE 262 MG
TABLET,CHEWABLE ORAL
Qty: 90 TAB | Refills: 0 | Status: SHIPPED | OUTPATIENT
Start: 2019-12-17 | End: 2020-03-25 | Stop reason: SDUPTHER

## 2019-12-17 RX ORDER — GUAIFENESIN 600 MG/1
600 TABLET, EXTENDED RELEASE ORAL 2 TIMES DAILY
Qty: 20 TAB | Refills: 1 | OUTPATIENT
Start: 2019-12-17

## 2019-12-17 RX ORDER — BACITRACIN ZINC 500 UNIT/G
OINTMENT (GRAM) TOPICAL 2 TIMES DAILY
Qty: 15 G | Refills: 1 | Status: SHIPPED | OUTPATIENT
Start: 2019-12-17 | End: 2020-11-17 | Stop reason: SDUPTHER

## 2019-12-17 RX ORDER — MECLIZINE HCL 12.5 MG 12.5 MG/1
12.5 TABLET ORAL
Qty: 30 TAB | Refills: 2 | Status: SHIPPED | OUTPATIENT
Start: 2019-12-17 | End: 2019-12-17

## 2019-12-17 NOTE — TELEPHONE ENCOUNTER
Pt calling to request medication refill of:    Requested Prescriptions     Pending Prescriptions Disp Refills    guaiFENesin ER (MUCINEX) 600 mg ER tablet 20 Tab 1     Sig: Take 1 Tab by mouth two (2) times a day. As needed for cough    acetaminophen (TYLENOL) 500 mg tablet 30 Tab 2     Sig: Take 1 Tab by mouth every six (6) hours as needed for Pain. And for fever or headaches  Indications: pain    meclizine (ANTIVERT) 12.5 mg tablet 30 Tab 2     Sig: Take 1 Tab by mouth three (3) times daily as needed (dizziness). For dizziness if blood pressure is normal    bacitracin zinc (BACITRACIN) ointment 15 g 1     Sig: Apply  to affected area two (2) times a day. As needed for simple superfecail wounds for 5 days          be sent to [address] [place] [address]. Pt has about ? remaining. Pts last appt was 11/4. Advised pt of 72 hour time frame for refill requests. Pt advised to contact pharmacy in 72 hours. Please advise.

## 2019-12-17 NOTE — TELEPHONE ENCOUNTER
Group home forget to request refill of   Requested Prescriptions     Pending Prescriptions Disp Refills    multivitamin (TAB-A-DARLYN) tablet 28 Tab 0     Earlier today when they called    Please advise.

## 2020-02-10 ENCOUNTER — TELEPHONE (OUTPATIENT)
Dept: FAMILY MEDICINE CLINIC | Age: 50
End: 2020-02-10

## 2020-02-10 NOTE — TELEPHONE ENCOUNTER
Spoke with Ashleigh and she states patient will be finished the Lamisil on Thursday. Dr. Porfirio Carlson advised patient that he is fine and does not need to continue the medication, that he just needs to get a pedicure. Ashleigh states they need a discontinue letter from our office stating the Lamisil needs to be discontinued once completed on Thursday. I will call for records from Dr. Scott Roche office. Please advise if ok to send a discontinue med letter.     Letter needs to be faxed to [phone], attention Ashleigh

## 2020-02-10 NOTE — LETTER
2/11/2020 8:11 AM 
 
Mr. Matthew Ragsdale [address] [facility] [address] [facility] [address] Please discontinue Terbinafine once medication is complete. Thank you. Sincerely, Gavino Weller MD

## 2020-02-10 NOTE — TELEPHONE ENCOUNTER
[facility] employee Ms. Sotelo is calling about the following medication terbinafine [facility] (LAMISIL) 250mg tablet. Pt went to see Dr. Nohemi Ferreira and at the end of the visit wasn't clear whether the pt was to stop taking it or continue the medication. Please call Ms. Sotelo back at [phone] for clarification

## 2020-02-11 NOTE — TELEPHONE ENCOUNTER
Note from Dr. Azucena Stover is in provider's green folder for review. Letter is in yellow folder for signature.

## 2020-03-25 RX ORDER — BISMUTH SUBSALICYLATE 262 MG
TABLET,CHEWABLE ORAL
Qty: 90 TAB | Refills: 3 | Status: SHIPPED | OUTPATIENT
Start: 2020-03-25 | End: 2020-11-17 | Stop reason: SDUPTHER

## 2020-09-10 ENCOUNTER — VIRTUAL VISIT (OUTPATIENT)
Dept: FAMILY MEDICINE CLINIC | Age: 50
End: 2020-09-10

## 2020-09-10 ENCOUNTER — TELEPHONE (OUTPATIENT)
Dept: FAMILY MEDICINE CLINIC | Age: 50
End: 2020-09-10
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
## 2020-09-10 DIAGNOSIS — G93.1 ANOXIC BRAIN INJURY (HCC): Primary | ICD-10-CM

## 2020-09-10 DIAGNOSIS — R27.0 ATAXIA: ICD-10-CM
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
## 2020-09-10 DIAGNOSIS — Z87.820 H/O TRAUMATIC BRAIN INJURY: ICD-10-CM

## 2020-09-10 RX ORDER — FLUTICASONE PROPIONATE 50 MCG
SPRAY, SUSPENSION (ML) NASAL
COMMUNITY
Start: 2020-09-01 | End: 2020-11-17 | Stop reason: SDUPTHER

## 2020-09-10 NOTE — PROGRESS NOTES
Javier Camilo pain hypokalemia is a 45 y.o. male who was seen by synchronous (real-time) audio-video technology on 9/10/2020 for No chief complaint on file. [address] [facility] behavioral specialist she is calling to report    He fell in the kitchen yesterday  Suddenly lost balance he was dizzy for few seconds and then got better , no injury  And he got up on his own immediately, he did not complain of dizziness prior is no fall and he did not complain of dizziness after he got up. Patient is not a great historian. Patient fall was witnessed by his behavioral specialist, she found that shuffling of his leg causing him to fall. Last night reported to vomit  By his roommate  But Clotilde Chin does not remembers and this morning and there is no nausea no abdominal pain he ate breakfast with no problems, he does get up at night and snacks a lot because he does not sleep well at night. He was seen at [place] [facility] in the past      Assessment & Plan:   Diagnoses and all orders for this visit:    1. Anoxic brain injury ([facility]    2. H/O traumatic brain injury    3. Ataxia    4. Fall in home, initial encounter    I have advised him to behavioral specialist to keep an eye on his any further falls monitor blood pressure  Patient need to schedule annual physical examination in the second week of October for Medicare wellness    Also consider follow-up with neurologist if any recurrence of fall  She agreed with the plan and verbalized understanding  7 she agreed with the plan and verbalized understanding 12  Subjective:       Prior to Admission medications    Medication Sig Start Date End Date Taking? Authorizing Provider   multivitamin (Tab-A-Saad) tablet TAKE 1 TABLET ONCE DAILY FOR NUTRITIONAL SUPPLEMENT. NOTIFY MD IF GI DISTRESS OCCURS. 3/25/20  Yes Rosa Meadows MD   acetaminophen (TYLENOL) 500 mg tablet Take 1 Tab by mouth every six (6) hours as needed for Pain.  And for fever or headaches Indications: pain 12/17/19  Yes Shirin aVlentin MD   bacitracin zinc (BACITRACIN) ointment Apply  to affected area two (2) times a day. As needed for simple superfecail wounds for 5 days 12/17/19  Yes Shirin Valentin MD   guaiFENesin ER (MUCINEX) 600 mg ER tablet Take 1 Tab by mouth two (2) times a day. As needed for cough 12/17/19  Yes Shirin Valentin MD   sodium chloride (OCEAN) 0.65 % nasal squeeze bottle 0.05 mL by Both Nostrils route as needed for Congestion. 12/10/19  Yes Shirin Valentin MD   temazepam (RESTORIL) 30 mg capsule Take 15 mg by mouth nightly. Yes Provider, Historical   ARIPiprazole (ABILIFY) 2 mg tablet Take 2 mg by mouth daily. Yes Provider, Historical   FLUoxetine (PROZAC) 20 mg capsule TK 1 C PO QD 1/23/18  Yes Provider, Historical   fluticasone propionate (FLONASE) 50 mcg/actuation nasal spray U 2 SPRAYS IEN HS FOR CHRONIC ALLERGIC RHINITIS 9/1/20   Provider, Historical   terbinafine HCl (LAMISIL) 250 mg tablet Take 1 Tab by mouth daily. 11/8/19 9/10/20  Shirin Valentin MD   FLUoxetine (PROZAC) 10 mg capsule Take 20 mg by mouth. 9/10/20  Provider, Historical   ARIPiprazole (ABILIFY) 5 mg tablet Take 5 mg by mouth daily.   9/10/20  Provider, Historical   temazepam (RESTORIL) 30 mg capsule 15 mg. 12/20/17 9/10/20  Provider, Historical   ARIPiprazole (ABILIFY) 10 mg tablet TK 1 T PO QAM 1/23/18 9/10/20  Provider, Historical     Patient Active Problem List   Diagnosis Code    Anoxic brain damage ([facility] G93.1    H/O traumatic brain injury Z87.820    Dizziness R42    Abnormal TSH R79.89    Urine frequency R35.0    Ataxia R27.0    Cognitive disorder F09    CP (cerebral palsy) ([facility] G80.9    Insomnia, unspecified G47.00    Major depressive disorder F32.9    Anoxic brain injury ([facility] G93.1    Vertigo R42     Patient Active Problem List    Diagnosis Date Noted    Abnormal TSH 09/10/2019    Urine frequency 09/10/2019    Dizziness 08/06/2019    [facility] 06/13/2018    H/O traumatic brain injury 06/13/2018    Ataxia 03/15/2018    Anoxic brain injury ([facility] 03/15/2018    Vertigo 03/15/2018    Cognitive disorder 04/13/2016    Insomnia, unspecified 04/13/2016    Major depressive disorder 04/13/2016    CP (cerebral palsy) ([facility] 12/01/2011     Current Outpatient Medications   Medication Sig Dispense Refill    multivitamin (Tab-A-Saad) tablet TAKE 1 TABLET ONCE DAILY FOR NUTRITIONAL SUPPLEMENT. NOTIFY MD IF GI DISTRESS OCCURS. 90 Tab 3    acetaminophen (TYLENOL) 500 mg tablet Take 1 Tab by mouth every six (6) hours as needed for Pain. And for fever or headaches  Indications: pain 30 Tab 2    bacitracin zinc (BACITRACIN) ointment Apply  to affected area two (2) times a day. As needed for simple superfecail wounds for 5 days 15 g 1    guaiFENesin ER (MUCINEX) 600 mg ER tablet Take 1 Tab by mouth two (2) times a day. As needed for cough 20 Tab 1    sodium chloride (OCEAN) 0.65 % nasal squeeze bottle 0.05 mL by Both Nostrils route as needed for Congestion. 104 mL 1    temazepam (RESTORIL) 30 mg capsule Take 15 mg by mouth nightly.  ARIPiprazole (ABILIFY) 2 mg tablet Take 2 mg by mouth daily.  FLUoxetine (PROZAC) 20 mg capsule TK 1 C PO QD  5    fluticasone propionate (FLONASE) 50 mcg/actuation nasal spray U 2 SPRAYS IEN HS FOR CHRONIC ALLERGIC RHINITIS       No Known Allergies  Past Medical History:   Diagnosis Date    Brain anoxic injury ([facility]     Depression 02/01/2005    recurrent episode, Major Depression    Psychotic disorder ([facility] 12/07/2007    Intellectual disability     Vertigo 03/17/2018     Past Surgical History:   Procedure Laterality Date    HX TRACHEOSTOMY N/A 1985     No family history on file. Social History     Tobacco Use    Smoking status: Never Smoker    Smokeless tobacco: Never Used   Substance Use Topics    Alcohol use: Yes       Review of Systems   Constitutional: Negative for chills, fever, malaise/fatigue and weight loss. HENT: Negative for congestion, ear discharge, ear pain, hearing loss, nosebleeds, sinus pain and sore throat. Eyes: Negative for blurred vision, double vision and discharge. Respiratory: Negative for cough, hemoptysis, sputum production, shortness of breath and wheezing. Cardiovascular: Negative for chest pain, palpitations, claudication and leg swelling. Gastrointestinal: Negative for abdominal pain, constipation, diarrhea, heartburn, nausea and vomiting. Genitourinary: Negative for dysuria, frequency and urgency. Musculoskeletal: Negative for back pain, joint pain, myalgias and neck pain. Skin: Negative for itching and rash. Neurological: Negative for dizziness, tingling, sensory change, speech change, focal weakness, seizures, weakness and headaches. Psychiatric/Behavioral: Negative for depression and suicidal ideas. Objective:     Patient-Reported Vitals 9/10/2020   Patient-Reported Weight 175 lb   Patient-Reported Pulse 88   Patient-Reported Temperature 97.4   Patient-Reported Systolic  864   Patient-Reported Diastolic 78      General: alert, cooperative, no distress   Mental  status: normal mood, behavior, speech, dress, motor activity, and thought processes, able to follow commands   HENT: NCAT   Neck: no visualized mass   Resp: no respiratory distress   Neuro: no gross deficits   Skin: no discoloration or lesions of concern on visible areas   Psychiatric: normal affect, consistent with stated mood, no evidence of hallucinations     Additional exam findings: We discussed the expected course, resolution and complications of the diagnosis(es) in detail. Medication risks, benefits, costs, interactions, and alternatives were discussed as indicated. I advised him to contact the office if his condition worsens, changes or fails to improve as anticipated. He expressed understanding with the diagnosis(es) and plan.        Javier Camilo, who was evaluated through a patient-initiated, synchronous (real-time) audio-video encounter, and/or his healthcare decision maker, is aware that it is a billable service, with coverage as determined by his insurance carrier. He provided verbal consent to proceed: Yes, and patient identification was verified. It was conducted pursuant to the emergency declaration under the [address] [place] [address] and the [employer] Act. A caregiver was present when appropriate. Ability to conduct physical exam was limited. I was in the office. The patient was at home.       Zain Sanchez MD

## 2020-11-17 ENCOUNTER — APPOINTMENT (OUTPATIENT)
Dept: FAMILY MEDICINE CLINIC | Age: 50
End: 2020-11-17

## 2020-11-17 ENCOUNTER — HOSPITAL ENCOUNTER (OUTPATIENT)
Dept: LAB | Age: 50
Discharge: HOME | End: 2020-11-17
Payer: MEDICARE

## 2020-11-17 ENCOUNTER — OFFICE VISIT (OUTPATIENT)
Dept: FAMILY MEDICINE CLINIC | Age: 50
End: 2020-11-17
Payer: MEDICARE

## 2020-11-17 VITALS
WEIGHT: 176 LBS | OXYGEN SATURATION: 98 % | DIASTOLIC BLOOD PRESSURE: 83 MMHG | TEMPERATURE: 97.5 F | HEART RATE: 83 BPM | BODY MASS INDEX: 24.64 KG/M2 | SYSTOLIC BLOOD PRESSURE: 120 MMHG | RESPIRATION RATE: 18 BRPM | HEIGHT: 71 IN

## 2020-11-17 DIAGNOSIS — Z13.220 LIPID SCREENING: ICD-10-CM

## 2020-11-17 DIAGNOSIS — R73.09 ABNORMAL BLOOD SUGAR: ICD-10-CM

## 2020-11-17 DIAGNOSIS — A49.9 BACTERIAL INFECTION: ICD-10-CM

## 2020-11-17 DIAGNOSIS — Z00.00 MEDICARE ANNUAL WELLNESS VISIT, SUBSEQUENT: ICD-10-CM

## 2020-11-17 DIAGNOSIS — N39.41 URGE INCONTINENCE OF URINE: ICD-10-CM

## 2020-11-17 DIAGNOSIS — Z12.5 SCREENING FOR PROSTATE CANCER: ICD-10-CM

## 2020-11-17 DIAGNOSIS — R35.0 FREQUENCY OF MICTURITION: ICD-10-CM

## 2020-11-17 DIAGNOSIS — J06.9 URTI (ACUTE UPPER RESPIRATORY INFECTION): ICD-10-CM

## 2020-11-17 DIAGNOSIS — Z23 NEEDS FLU SHOT: ICD-10-CM

## 2020-11-17 DIAGNOSIS — R09.81 NASAL CONGESTION: ICD-10-CM

## 2020-11-17 DIAGNOSIS — Z00.00 MEDICARE ANNUAL WELLNESS VISIT, SUBSEQUENT: Primary | ICD-10-CM

## 2020-11-17 DIAGNOSIS — R05.9 COUGH: ICD-10-CM

## 2020-11-17 DIAGNOSIS — R97.20 ELEVATED PSA MEASUREMENT: ICD-10-CM

## 2020-11-17 DIAGNOSIS — Z12.11 SCREENING FOR MALIGNANT NEOPLASM OF COLON: ICD-10-CM

## 2020-11-17 DIAGNOSIS — Z13.5 SCREENING FOR GLAUCOMA: ICD-10-CM

## 2020-11-17 DIAGNOSIS — Z23 ENCOUNTER FOR IMMUNIZATION: ICD-10-CM

## 2020-11-17 LAB
ALBUMIN SERPL-MCNC: 4 G/DL (ref 3.4–5)
ALBUMIN/GLOB SERPL: 1.2 {RATIO} (ref 0.8–1.7)
ALP SERPL-CCNC: 61 U/L (ref 45–117)
ALT SERPL-CCNC: 20 U/L (ref 16–61)
ANION GAP SERPL CALC-SCNC: 4 MMOL/L (ref 3–18)
APPEARANCE UR: CLEAR
AST SERPL-CCNC: 12 U/L (ref 10–38)
BILIRUB SERPL-MCNC: 0.6 MG/DL (ref 0.2–1)
BILIRUB UR QL: NEGATIVE
BUN SERPL-MCNC: 12 MG/DL (ref 7–18)
BUN/CREAT SERPL: 13 (ref 12–20)
CALCIUM SERPL-MCNC: 9.1 MG/DL (ref 8.5–10.1)
CHLORIDE SERPL-SCNC: 107 MMOL/L (ref 100–111)
CHOLEST SERPL-MCNC: 148 MG/DL
CO2 SERPL-SCNC: 31 MMOL/L (ref 21–32)
COLOR UR: YELLOW
CREAT SERPL-MCNC: 0.96 MG/DL (ref 0.6–1.3)
GLOBULIN SER CALC-MCNC: 3.3 G/DL (ref 2–4)
GLUCOSE SERPL-MCNC: 76 MG/DL (ref 74–99)
GLUCOSE UR STRIP.AUTO-MCNC: NEGATIVE MG/DL
HBA1C MFR BLD: 5.1 % (ref 4.2–5.6)
HDLC SERPL-MCNC: 44 MG/DL (ref 40–60)
HDLC SERPL: 3.4 {RATIO} (ref 0–5)
HGB UR QL STRIP: NEGATIVE
KETONES UR QL STRIP.AUTO: NEGATIVE MG/DL
LDLC SERPL CALC-MCNC: 84.4 MG/DL (ref 0–100)
LEUKOCYTE ESTERASE UR QL STRIP.AUTO: NEGATIVE
LIPID PROFILE,FLP: NORMAL
NITRITE UR QL STRIP.AUTO: NEGATIVE
PH UR STRIP: 6 [PH] (ref 5–8)
POTASSIUM SERPL-SCNC: 4.2 MMOL/L (ref 3.5–5.5)
PROT SERPL-MCNC: 7.3 G/DL (ref 6.4–8.2)
PROT UR STRIP-MCNC: NEGATIVE MG/DL
PSA SERPL-MCNC: 5.5 NG/ML (ref 0–4)
SODIUM SERPL-SCNC: 142 MMOL/L (ref 136–145)
SP GR UR REFRACTOMETRY: 1.01 (ref 1–1.03)
TRIGL SERPL-MCNC: 98 MG/DL (ref ?–150)
UROBILINOGEN UR QL STRIP.AUTO: 0.2 EU/DL (ref 0.2–1)
VLDLC SERPL CALC-MCNC: 19.6 MG/DL

## 2020-11-17 PROCEDURE — 81003 URINALYSIS AUTO W/O SCOPE: CPT

## 2020-11-17 PROCEDURE — G8427 DOCREV CUR MEDS BY ELIG CLIN: HCPCS | Performed by: LEGAL MEDICINE

## 2020-11-17 PROCEDURE — 83036 HEMOGLOBIN GLYCOSYLATED A1C: CPT

## 2020-11-17 PROCEDURE — G0439 PPPS, SUBSEQ VISIT: HCPCS | Performed by: LEGAL MEDICINE

## 2020-11-17 PROCEDURE — 80053 COMPREHEN METABOLIC PANEL: CPT

## 2020-11-17 PROCEDURE — G8420 CALC BMI NORM PARAMETERS: HCPCS | Performed by: LEGAL MEDICINE

## 2020-11-17 PROCEDURE — 80061 LIPID PANEL: CPT

## 2020-11-17 PROCEDURE — 90686 IIV4 VACC NO PRSV 0.5 ML IM: CPT

## 2020-11-17 PROCEDURE — 87086 URINE CULTURE/COLONY COUNT: CPT

## 2020-11-17 PROCEDURE — 3017F COLORECTAL CA SCREEN DOC REV: CPT | Performed by: LEGAL MEDICINE

## 2020-11-17 PROCEDURE — 84153 ASSAY OF PSA TOTAL: CPT

## 2020-11-17 PROCEDURE — 36415 COLL VENOUS BLD VENIPUNCTURE: CPT

## 2020-11-17 PROCEDURE — G9717 DOC PT DX DEP/BP F/U NT REQ: HCPCS | Performed by: LEGAL MEDICINE

## 2020-11-17 RX ORDER — FLUTICASONE PROPIONATE 50 MCG
1 SPRAY, SUSPENSION (ML) NASAL DAILY
Qty: 1 BOTTLE | Refills: 5 | Status: SHIPPED | OUTPATIENT
Start: 2020-11-17 | End: 2021-03-03 | Stop reason: SDUPTHER

## 2020-11-17 RX ORDER — FLUDROCORTISONE ACETATE 0.1 MG/1
0.1 TABLET ORAL DAILY
COMMUNITY
End: 2022-10-04

## 2020-11-17 RX ORDER — ACETAMINOPHEN 500 MG
500 TABLET ORAL
Qty: 30 TAB | Refills: 2 | Status: SHIPPED | OUTPATIENT
Start: 2020-11-17 | End: 2021-11-18 | Stop reason: SDUPTHER

## 2020-11-17 RX ORDER — GUAIFENESIN 600 MG/1
600 TABLET, EXTENDED RELEASE ORAL 2 TIMES DAILY
Qty: 20 TAB | Refills: 2 | Status: SHIPPED | OUTPATIENT
Start: 2020-11-17 | End: 2021-11-18 | Stop reason: SDUPTHER

## 2020-11-17 RX ORDER — BISMUTH SUBSALICYLATE 262 MG
TABLET,CHEWABLE ORAL
Qty: 90 TAB | Refills: 3 | Status: SHIPPED | OUTPATIENT
Start: 2020-11-17 | End: 2021-11-03 | Stop reason: SDUPTHER

## 2020-11-17 RX ORDER — TEMAZEPAM 15 MG/1
CAPSULE ORAL
COMMUNITY
Start: 2020-11-10

## 2020-11-17 RX ORDER — BACITRACIN ZINC 500 UNIT/G
OINTMENT (GRAM) TOPICAL 2 TIMES DAILY
Qty: 15 G | Refills: 3 | Status: SHIPPED | OUTPATIENT
Start: 2020-11-17 | End: 2021-11-18 | Stop reason: SDUPTHER

## 2020-11-17 NOTE — PROGRESS NOTES
(AWV) The Initial Medicare Annual Wellness Exam PROGRESS NOTE    This is an Initial Medicare Annual Wellness Exam (AWV) (Performed 12 months after IPPE or effective date of Medicare Part B enrollment, Once in a lifetime)    I have reviewed the patient's medical history in detail and updated the computerized patient record. Cristofer Torres is a 45 y.o.  male and presents for an annual wellness exam     He is accompanied by Geovany Xiao behavioral specialist     patient had tilt table test by neurologist and was work-up for his dizziness and got diagnosed with orthostatic hypotension and has been started on medication for 1 week now    Patient also has been having what sounds like urge incontinence that happened about 5 times a month for the last 3 months, now there is no dysuria          Patient Active Problem List   Diagnosis Code    Anoxic brain damage ([facility] G93.1    H/O traumatic brain injury Z87.820    Dizziness R42    Abnormal TSH R79.89    Urine frequency R35.0    Ataxia R27.0    Cognitive disorder F09    CP (cerebral palsy) ([facility] G80.9    Insomnia, unspecified G47.00    Major depressive disorder F32.9    Anoxic brain injury ([facility] G93.1    Vertigo R42     Patient Active Problem List    Diagnosis Date Noted    Abnormal TSH 09/10/2019    Urine frequency 09/10/2019    Dizziness 08/06/2019    Anoxic brain damage ([address] 06/13/2018    H/O traumatic brain injury 06/13/2018    Ataxia 03/15/2018    Anoxic brain injury ([address] 03/15/2018    Vertigo 03/15/2018    Cognitive disorder 04/13/2016    Insomnia, unspecified 04/13/2016    Major depressive disorder 04/13/2016    CP (cerebral palsy) ([facility] 12/01/2011     Current Outpatient Medications   Medication Sig Dispense Refill    fludrocortisone (FLORINEF) 0.1 mg tablet Take 0.1 mg by mouth daily.       temazepam (RESTORIL) 15 mg capsule       fluticasone propionate (FLONASE) 50 mcg/actuation nasal spray 1 [place] by Both Nostrils route daily for 30 days. 1 Bottle 5    multivitamin (Tab-A-Saad) tablet TAKE 1 TABLET ONCE DAILY FOR NUTRITIONAL SUPPLEMENT. NOTIFY MD IF GI DISTRESS OCCURS. 90 Tab 3    acetaminophen (TYLENOL) 500 mg tablet Take 1 Tab by mouth every six (6) hours as needed for Pain. And for fever or headaches  Indications: pain 30 Tab 2    bacitracin zinc (BACITRACIN) ointment Apply  to affected area two (2) times a day. As needed for simple superfecail wounds for 5 days 15 g 3    guaiFENesin ER (MUCINEX) 600 mg ER tablet Take 1 Tab by mouth two (2) times a day. As needed for cough 20 Tab 2    sodium chloride (OCEAN) 0.65 % nasal squeeze bottle 0.05 mL by Both Nostrils route as needed for Congestion. 104 mL 5    ARIPiprazole (ABILIFY) 2 mg tablet Take 2 mg by mouth daily.  FLUoxetine (PROZAC) 20 mg capsule TK 1 C PO QD  5     No Known Allergies  Past Medical History:   Diagnosis Date    [facility]     Depression 02/01/2005    recurrent episode, Major Depression    Psychotic disorder ([facility] 12/07/2007    Intellectual disability     Vertigo 03/17/2018     Past Surgical History:   Procedure Laterality Date    HX TRACHEOSTOMY N/A 1985     No family history on file.   Social History     Tobacco Use    Smoking status: Never Smoker    Smokeless tobacco: Never Used   Substance Use Topics    Alcohol use: Yes       ROS   History obtained from [facility] behavioral specialist  Who accompanied him during this visit     General ROS: negative for - chills, fatigue or fever  Psychological ROS: negative for - anxiety or depression  Ophthalmic ROS: negative for - blurry vision, decreased vision or double vision  ENT ROS: negative for - epistaxis, headaches or hearing change  Hematological and Lymphatic ROS: negative for - bleeding problems, blood clots or blood transfusions  Respiratory ROS: no cough, shortness of breath, or wheezing  Cardiovascular ROS: no chest pain or dyspnea on exertion  Gastrointestinal ROS: no abdominal pain, change in bowel habits, or black or bloody stools  Genito-Urinary ROS: positive for - incontinence and urinary frequency/urgency  Musculoskeletal ROS: negative  negative for - joint pain or muscle pain        History     Past Medical History:   Diagnosis Date    Brain anoxic injury ([facility]     Depression 02/01/2005    recurrent episode, Major Depression    Psychotic disorder ([facility] 12/07/2007    Intellectual disability     Vertigo 03/17/2018      Past Surgical History:   Procedure Laterality Date    HX TRACHEOSTOMY N/A 1985     Current Outpatient Medications   Medication Sig Dispense Refill    fludrocortisone (FLORINEF) 0.1 mg tablet Take 0.1 mg by mouth daily.  temazepam (RESTORIL) 15 mg capsule       fluticasone propionate (FLONASE) 50 mcg/actuation nasal spray 1 [place] by Both Nostrils route daily for 30 days. 1 Bottle 5    multivitamin (Tab-A-Saad) tablet TAKE 1 TABLET ONCE DAILY FOR NUTRITIONAL SUPPLEMENT. NOTIFY MD IF GI DISTRESS OCCURS. 90 Tab 3    acetaminophen (TYLENOL) 500 mg tablet Take 1 Tab by mouth every six (6) hours as needed for Pain. And for fever or headaches  Indications: pain 30 Tab 2    bacitracin zinc (BACITRACIN) ointment Apply  to affected area two (2) times a day. As needed for simple superfecail wounds for 5 days 15 g 3    guaiFENesin ER (MUCINEX) 600 mg ER tablet Take 1 Tab by mouth two (2) times a day. As needed for cough 20 Tab 2    sodium chloride (OCEAN) 0.65 % nasal squeeze bottle 0.05 mL by Both Nostrils route as needed for Congestion. 104 mL 5    ARIPiprazole (ABILIFY) 2 mg tablet Take 2 mg by mouth daily.  FLUoxetine (PROZAC) 20 mg capsule TK 1 C PO QD  5     No Known Allergies  No family history on file.   Social History     Tobacco Use    Smoking status: Never Smoker    Smokeless tobacco: Never Used   Substance Use Topics    Alcohol use: Yes     Patient Active Problem List   Diagnosis Code    Anoxic brain damage ([facility] G93.1    H/O traumatic brain injury Z87.820    Dizziness R42    Abnormal TSH R79.89    Urine frequency R35.0    Ataxia R27.0    Cognitive disorder F09    [facility] ([facility] G80.9    Insomnia, unspecified G47.00    Major depressive disorder F32.9    Anoxic brain injury ([facility] G93.1    Vertigo R42       Health Maintenance History  Immunizations reviewed,  flu will be given today, zoster he was given at the pharmacy     Colonoscopy: Patient declined colonoscopy Cologuard test has been ordered        Eye exam: Patient will be referred for eye examination for glaucoma screening          Depression Risk Factor Screening:      Patient Health Questionnaire (PHQ-2)   Over the last 2 weeks, how often have you been bothered by any of the following problems? · Little interest or pleasure in doing things? · Not at all. [0]  · Feeling down, depressed, or hopeless? · Not at all. [0]    Total Score: 0/6  PHQ-2 Assessment Scoring:   A score of 2 or more requires further screening with the PHQ-9    Alcohol Risk Factor Screening:     Women: On any occasion during the past 3 months, have you had more than 3 drinks containing alcohol? Do you average more than 7 drinks per week? Men: On any occasion during the past 3 months, have you had more than 4 drinks containing alcohol? No   Do you average more than 14 drinks per week? No    Functional Ability and Level of Safety:     Hearing Loss    Hearing is good. Activities of Daily Living   Partial assistance. Requires assistance with: no ADLs    Fall Risk   No fall risk factors    Abuse Screen   Patient is not abused  None    Examination   Physical Examination  Vitals:    11/17/20 1110   BP: 120/83   Pulse: 83   Resp: 18   Temp: 97.5 °F (36.4 °C)   TempSrc: Temporal   SpO2: 98%   Weight: 176 lb (79.8 kg)   Height: 5' 11\" (1.803 m)      Body mass index is 24.55 kg/m².      Evaluation of Cognitive Function:  Mood/affect:Good   Appearance:well groomed   Family member/caregiver input:    alert, well appearing, and in no distress and normal appearing weight    Patient Care Team:  Dahiana Wong MD as PCP - General (Internal Medicine)  Dahiana Wong MD as PCP - [facility] Empaneled Provider  Star Plascencia DPM (Podiatry)    End-of-life planning  Advanced Directive in the case than an injury or illness causes the patient to be unable to make health care decisions    Health Care Directive or Living Will: no    Advice/Referrals/Counselling/Plan:   Education and counseling provided:  Are appropriate based on today's review and evaluation  Influenza Vaccine  Prostate cancer screening tests (PSA, covered annually)  Colorectal cancer screening tests  Screening for glaucoma  Include in education list (weight loss, physical activity, smoking cessation, fall prevention, and nutrition)    ICD-10-CM ICD-9-CM    1. Medicare annual wellness visit, subsequent  F17.32 U38.4 METABOLIC PANEL, COMPREHENSIVE   2. Urge incontinence of urine  N39.41 788.31 CULTURE, URINE      URINALYSIS W/ RFLX MICROSCOPIC   3. Frequency of micturition  R35.0 788.41 CULTURE, URINE      URINALYSIS W/ RFLX MICROSCOPIC      METABOLIC PANEL, COMPREHENSIVE   4. Needs flu shot  Z23 V04.81 INFLUENZA VIRUS VAC QUAD,SPLIT,PRESV FREE SYRINGE IM   5. Screening for malignant neoplasm of colon  Z12.11 V76.51 COLOGUARD TEST (FECAL DNA COLORECTAL CANCER SCREENING)   6. Screening for prostate cancer  Z12.5 V76.44 PSA SCREENING (SCREENING)   7. Screening for glaucoma  Z13.5 V80.1 REFERRAL TO OPHTHALMOLOGY   8. Abnormal blood sugar  R73.09 790.29 HEMOGLOBIN A1C W/O EAG      METABOLIC PANEL, COMPREHENSIVE   9. Lipid screening  Z13.220 V77.91 LIPID PANEL   10. Encounter for immunization  Z23 V03.89    11. URTI (acute upper respiratory infection)  J06.9 465.9 acetaminophen (TYLENOL) 500 mg tablet      guaiFENesin ER (MUCINEX) 600 mg ER tablet   12. Bacterial infection  A49.9 041.9 bacitracin zinc (BACITRACIN) ointment   13.  Cough  R05 786.2 guaiFENesin ER (MUCINEX) 600 mg ER tablet   14. Nasal congestion  R09.81 478.19 fluticasone propionate (FLONASE) 50 mcg/actuation nasal spray      sodium chloride (OCEAN) 0.65 % nasal squeeze bottle     Encounter Diagnoses   Name Primary?     Medicare annual wellness visit, subsequent Yes    Urge incontinence of urine     Frequency of micturition     Needs flu shot     Screening for malignant neoplasm of colon     Screening for prostate cancer     Screening for glaucoma     Abnormal blood sugar     Lipid screening     Encounter for immunization     URTI (acute upper respiratory infection)     Bacterial infection     Cough     Nasal congestion      Orders Placed This Encounter    CULTURE, URINE    Influenza Virus Vaccine QUAD, PF Syr 6 Months + (Flulaval, Fluzone, Fluarix [id number])    URINALYSIS W/ RFLX MICROSCOPIC    PSA SCREENING (SCREENING)    COLOGUARD TEST (FECAL DNA COLORECTAL CANCER SCREENING)    HEMOGLOBIN A1C W/O EAG    METABOLIC PANEL, COMPREHENSIVE    LIPID PANEL    REFERRAL TO OPHTHALMOLOGY    fludrocortisone (FLORINEF) 0.1 mg tablet    temazepam (RESTORIL) 15 mg capsule    fluticasone propionate (FLONASE) 50 mcg/actuation nasal spray    multivitamin (Tab-A-Saad) tablet    acetaminophen (TYLENOL) 500 mg tablet    bacitracin zinc (BACITRACIN) ointment    guaiFENesin ER (MUCINEX) 600 mg ER tablet    sodium chloride (OCEAN) 0.65 % nasal squeeze bottle     Orders Placed This Encounter    CULTURE, URINE     Standing Status:   Future     Number of Occurrences:   1     Standing Expiration Date:   11/18/2021    Influenza Virus Vaccine QUAD, PF Syr 6 Months + (Flulaval, Fluzone, Fluarix [id number])    URINALYSIS W/ RFLX MICROSCOPIC     Standing Status:   Future     Number of Occurrences:   1     Standing Expiration Date:   11/17/2021    PSA SCREENING (SCREENING)     Standing Status:   Future     Number of Occurrences:   1     Standing Expiration Date:   11/18/2021    COLOGUARD TEST (FECAL DNA COLORECTAL CANCER SCREENING)    HEMOGLOBIN A1C W/O EAG     Standing Status:   Future     Number of Occurrences:   1     Standing Expiration Date:       METABOLIC PANEL, COMPREHENSIVE     Standing Status:   Future     Number of Occurrences:   1     Standing Expiration Date:   2021    LIPID PANEL     Standing Status:   Future     Number of Occurrences:   1     Standing Expiration Date:   2021    REFERRAL TO OPHTHALMOLOGY     Referral Priority:   Routine     Referral Type:   Consultation     Referral Reason:   Specialty Services Required     Requested Specialty:   Ophthalmology     Number of Visits Requested:   1    fluticasone propionate (FLONASE) 50 mcg/actuation nasal spray     [place] by Both Nostrils route daily for 30 days. Dispense:  1 Bottle     Refill:  5    multivitamin (Tab-A-Saad) tablet     Sig: TAKE 1 TABLET ONCE DAILY FOR NUTRITIONAL SUPPLEMENT. NOTIFY MD IF GI DISTRESS OCCURS. Dispense:  90 Tab     Refill:  3    acetaminophen (TYLENOL) 500 mg tablet     Sig: Take 1 Tab by mouth every six (6) hours as needed for Pain. And for fever or headaches  Indications: pain     Dispense:  30 Tab     Refill:  2    bacitracin zinc (BACITRACIN) ointment     Sig: Apply  to affected area two (2) times a day. As needed for simple superfecail wounds for 5 days     Dispense:  15 g     Refill:  3    guaiFENesin ER (MUCINEX) 600 mg ER tablet     Sig: Take 1 Tab by mouth two (2) times a day. As needed for cough     Dispense:  20 Tab     Refill:  2    sodium chloride (OCEAN) 0.65 % nasal squeeze bottle     Si.05 mL by Both Nostrils route as needed for Congestion.      Dispense:  104 mL     Refill:  5     Orders Placed This Encounter    CULTURE, URINE    Influenza Virus Vaccine QUAD, PF Syr 6 Months + (Flulaval, Fluzone, Fluarix [id number])    URINALYSIS W/ RFLX MICROSCOPIC    PSA SCREENING (SCREENING)    COLOGUARD TEST (FECAL DNA COLORECTAL CANCER SCREENING)    HEMOGLOBIN A1C W/O EAG    METABOLIC PANEL, COMPREHENSIVE    LIPID PANEL    REFERRAL TO OPHTHALMOLOGY    fluticasone propionate (FLONASE) 50 mcg/actuation nasal spray    multivitamin (Tab-A-Saad) tablet    acetaminophen (TYLENOL) 500 mg tablet    bacitracin zinc (BACITRACIN) ointment    guaiFENesin ER (MUCINEX) 600 mg ER tablet    sodium chloride (OCEAN) 0.65 % nasal squeeze bottle     Diagnoses and all orders for this visit:    1. Medicare annual wellness visit, subsequent  -     METABOLIC PANEL, COMPREHENSIVE; Future    2. Urge incontinence of urine  -     CULTURE, URINE; Future  -     URINALYSIS W/ RFLX MICROSCOPIC; Future    3. Frequency of micturition  -     CULTURE, URINE; Future  -     URINALYSIS W/ RFLX MICROSCOPIC; Future  -     METABOLIC PANEL, COMPREHENSIVE; Future    4. Needs flu shot  -     INFLUENZA VIRUS VAC QUAD,SPLIT,PRESV FREE SYRINGE IM    5. Screening for malignant neoplasm of colon  -     COLOGUARD TEST (FECAL DNA COLORECTAL CANCER SCREENING)    6. Screening for prostate cancer  -     PSA SCREENING (SCREENING); Future    7. Screening for glaucoma  -     REFERRAL TO OPHTHALMOLOGY    8. Abnormal blood sugar  -     HEMOGLOBIN A1C W/O EAG; Future  -     METABOLIC PANEL, COMPREHENSIVE; Future    9. Lipid screening  -     LIPID PANEL; Future    10. Encounter for immunization  Flu vaccine was given with no complications    11. URTI (acute upper respiratory infection)  -     acetaminophen (TYLENOL) 500 mg tablet; Take 1 Tab by mouth every six (6) hours as needed for Pain. And for fever or headaches  Indications: pain  -     guaiFENesin ER (MUCINEX) 600 mg ER tablet; Take 1 Tab by mouth two (2) times a day. As needed for cough    12. Bacterial infection  -     bacitracin zinc (BACITRACIN) ointment; Apply  to affected area two (2) times a day. As needed for simple superfecail wounds for 5 days    13. Cough  -     guaiFENesin ER (MUCINEX) 600 mg ER tablet; Take 1 Tab by mouth two (2) times a day. As needed for cough    14. Nasal congestion  -     fluticasone propionate (FLONASE) 50 mcg/actuation nasal spray; 1 [place] by Both Nostrils route daily for 30 days. -     sodium chloride (OCEAN) 0.65 % nasal squeeze bottle; 0.05 mL by Both Nostrils route as needed for Congestion. Other orders  -     multivitamin (Tab-A-Saad) tablet; TAKE 1 TABLET ONCE DAILY FOR NUTRITIONAL SUPPLEMENT. NOTIFY MD IF GI DISTRESS OCCURS. .  Brief written plan, checklist    I have discussed the diagnosis with the patient and the intended plan as seen in the above orders. The patient has received an after-visit summary and questions were answered concerning future plans. I have discussed medication side effects and warnings with the patient as well. I have reviewed the plan of care with the patient, accepted their input and they are in agreement with the treatment goals.                ____________________________________________________________    Problem Assessment    for treatment of   Chief Complaint   Patient presents with    Complete Physical    Immunization/Injection     flu shot

## 2020-11-17 NOTE — PROGRESS NOTES
Cece Whitman is a 45 y.o. male (: 1970) presenting to address:    Chief Complaint   Patient presents with    Complete Physical    Immunization/Injection     flu shot       Vitals:    20 1110   BP: 120/83   Pulse: 83   Resp: 18   Temp: 97.5 °F (36.4 °C)   TempSrc: Temporal   SpO2: 98%   Weight: 176 lb (79.8 kg)   Height: 5' 11\" (1.803 m)       Hearing/Vision:   No exam data present    Learning Assessment:     Learning Assessment 9/10/2019   PRIMARY LEARNER Patient   HIGHEST LEVEL OF EDUCATION - PRIMARY LEARNER  -   BARRIERS PRIMARY LEARNER -   CO-LEARNER CAREGIVER -   PRIMARY LANGUAGE ENGLISH   LEARNER PREFERENCE PRIMARY READING     LISTENING     VIDEOS   ANSWERED BY patient   RELATIONSHIP SELF     Depression Screening:     3 most recent PHQ Screens 9/10/2019   PHQ Not Done -   Little interest or pleasure in doing things Not at all   Feeling down, depressed, irritable, or hopeless Not at all   Total Score PHQ 2 0     Fall Risk Assessment:     Fall Risk Assessment, last 12 mths 9/10/2019   Able to walk? No   Fall in past 12 months? -   Fall with injury? -   Number of falls in past 12 months -   Fall Risk Score -     Abuse Screening:     Abuse Screening Questionnaire 9/10/2019   Do you ever feel afraid of your partner? N   Are you in a relationship with someone who physically or mentally threatens you? N   Is it safe for you to go home? Y     Coordination of Care Questionaire:   1. Have you been to the ER, urgent care clinic since your last visit? Hospitalized since your last visit? YES    2. Have you seen or consulted any other health care providers outside of the [address] [place] [address] since your last visit? Include any pap smears or colon screening. YES, Dr. Kaitlynn Romero    Advanced Directive:   1. Do you have an Advanced Directive? NO    2. Would you like information on Advanced Directives? NO    Flu Immunization/s administered 2020 by Radha Leach with patient's consent.     Patient tolerated procedure well. No reactions noted.

## 2020-11-19 LAB
BACTERIA SPEC CULT: ABNORMAL
CC UR VC: ABNORMAL
SERVICE CMNT-IMP: ABNORMAL

## 2020-11-22 NOTE — PROGRESS NOTES
Urine results contamination,consider repeating a clean catch urine if possible PSP is border line elevated referral to urology

## 2020-11-24 NOTE — PROGRESS NOTES
Edison Casillas informed of results and she will bring him to do another culture.   Also, advised referral to [facility]

## 2020-11-25 ENCOUNTER — HOSPITAL ENCOUNTER (OUTPATIENT)
Dept: LAB | Age: 50
Discharge: HOME | End: 2020-11-25
Payer: MEDICARE

## 2020-11-25 DIAGNOSIS — R35.0 FREQUENCY OF MICTURITION: ICD-10-CM

## 2020-11-25 DIAGNOSIS — N39.41 URGE INCONTINENCE OF URINE: ICD-10-CM

## 2020-11-25 PROCEDURE — 87086 URINE CULTURE/COLONY COUNT: CPT

## 2020-11-27 LAB
BACTERIA SPEC CULT: NORMAL
SERVICE CMNT-IMP: NORMAL

## 2021-02-25 ENCOUNTER — TELEPHONE (OUTPATIENT)
Dept: FAMILY MEDICINE CLINIC | Age: 51
End: 2021-02-25

## 2021-02-25 DIAGNOSIS — R09.81 NASAL CONGESTION: ICD-10-CM

## 2021-02-25 NOTE — TELEPHONE ENCOUNTER
Case Coordinator needs clarification on the medications Meclizine and Flonase. Please advise. Azathioprine Counseling:  I discussed with the patient the risks of azathioprine including but not limited to myelosuppression, immunosuppression, hepatotoxicity, lymphoma, and infections.  The patient understands that monitoring is required including baseline LFTs, Creatinine, possible TPMP genotyping and weekly CBCs for the first month and then every 2 weeks thereafter.  The patient verbalized understanding of the proper use and possible adverse effects of azathioprine.  All of the patient's questions and concerns were addressed.

## 2021-02-25 NOTE — TELEPHONE ENCOUNTER
For patient's Flonase can redo script to not say for 30 days due to when state comes in it looks like they in compliance with patient's medication. Please assist, thank you.

## 2021-02-26 NOTE — TELEPHONE ENCOUNTER
No it doesn't, the script says for 30 days, but it can not say that. They only need the instructions on it.

## 2021-03-03 RX ORDER — FLUTICASONE PROPIONATE 50 MCG
1 SPRAY, SUSPENSION (ML) NASAL DAILY
Qty: 3 BOTTLE | Refills: 3 | Status: SHIPPED | OUTPATIENT
Start: 2021-03-03 | End: 2021-06-01

## 2021-03-04 NOTE — TELEPHONE ENCOUNTER
Spoke with Maribel. She believes what we sent is okay since it has refills. They were worried that in a state audit it might show pt was only prescribed this as a temporary med for 90 days only. She thinks it will be fine for their records now.

## 2021-08-03 PROBLEM — R42 DIZZINESS: Status: RESOLVED | Noted: 2019-08-06 | Resolved: 2021-08-03
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
## 2021-10-14 ENCOUNTER — TELEPHONE (OUTPATIENT)
Dept: FAMILY MEDICINE CLINIC | Age: 51
End: 2021-10-14

## 2021-10-14 DIAGNOSIS — Z00.00 MEDICARE ANNUAL WELLNESS VISIT, SUBSEQUENT: ICD-10-CM

## 2021-10-14 DIAGNOSIS — Z13.220 LIPID SCREENING: ICD-10-CM

## 2021-10-14 DIAGNOSIS — Z00.00 ANNUAL PHYSICAL EXAM: Primary | ICD-10-CM

## 2021-10-14 DIAGNOSIS — R73.09 ABNORMAL BLOOD SUGAR: ICD-10-CM

## 2021-10-14 DIAGNOSIS — Z11.59 NEED FOR HEPATITIS C SCREENING TEST: ICD-10-CM

## 2021-10-14 DIAGNOSIS — R79.89 ABNORMAL SERUM THYROID STIMULATING HORMONE (TSH) LEVEL: ICD-10-CM

## 2021-10-14 DIAGNOSIS — R94.6 ABNORMAL RESULTS OF THYROID FUNCTION STUDIES: ICD-10-CM

## 2021-10-14 NOTE — TELEPHONE ENCOUNTER
Please place orders for upcoming CPE. Please let me know when orders are placed.      Future Appointments   Date Time Provider [facility]   11/18/2021 10:00 AM Ariella Smart MD BS[facility] BS AMB

## 2021-10-14 NOTE — TELEPHONE ENCOUNTER
----- Message from Marii Goodwin sent at 10/13/2021 12:30 PM EDT -----  Pt needs lab orders for upcoming cpe on 11/18 with Dr. Baxter Amel

## 2021-10-22 NOTE — TELEPHONE ENCOUNTER
Appt scheduled.      Future Appointments   Date Time Provider Jem Ruiz   11/11/2021  9:10 AM LAB_[facility] BS[facility] BS AMB   11/18/2021 10:00 AM MD VAISHALI ShawMA BS AMB   3/8/2022  9:20 AM [facility] NURSE [facility] LESLYE TELMA   3/15/2022  9:45 AM Todd Prieto MD [facility]   4/15/2022  1:15 PM Todd Prieto MD [address] [place] [address]

## 2021-11-03 RX ORDER — BISMUTH SUBSALICYLATE 262 MG
TABLET,CHEWABLE ORAL
Qty: 90 TABLET | Refills: 3 | Status: SHIPPED | OUTPATIENT
Start: 2021-11-03 | End: 2021-11-18 | Stop reason: SDUPTHER

## 2021-11-03 NOTE — TELEPHONE ENCOUNTER
Fer Sanchez called for their medication refill.     Last Office visit:  1/17/2020    Last Filled: 11/17/2020; Qty 90 w/ 3 refills     Follow up visit:    Future Appointments   Date Time Provider eJm Ruiz   11/9[facility]2021  8:[facility]   11/18/2021 10:00 AM James Green MD [facility]   3/8/2022  9:20 AM [facility] NURSE [facility]   3/15/2022  9:45 AM Deshaun Prieto MD [address] [place] [address]   4/15/2022  1:15 PM Deshaun Prieto MD [address] [place] [address]

## 2021-11-09 ENCOUNTER — HOSPITAL ENCOUNTER (OUTPATIENT)
Dept: LAB | Age: 51
Discharge: HOME | End: 2021-11-09
Payer: MEDICARE

## 2021-11-09 ENCOUNTER — APPOINTMENT (OUTPATIENT)
Dept: FAMILY MEDICINE CLINIC | Age: 51
End: 2021-11-09

## 2021-11-09 DIAGNOSIS — Z11.59 NEED FOR HEPATITIS C SCREENING TEST: ICD-10-CM

## 2021-11-09 DIAGNOSIS — Z00.00 MEDICARE ANNUAL WELLNESS VISIT, SUBSEQUENT: ICD-10-CM

## 2021-11-09 DIAGNOSIS — Z13.220 LIPID SCREENING: ICD-10-CM

## 2021-11-09 DIAGNOSIS — R94.6 ABNORMAL RESULTS OF THYROID FUNCTION STUDIES: ICD-10-CM

## 2021-11-09 DIAGNOSIS — R73.09 ABNORMAL BLOOD SUGAR: ICD-10-CM

## 2021-11-09 DIAGNOSIS — R79.89 ABNORMAL SERUM THYROID STIMULATING HORMONE (TSH) LEVEL: ICD-10-CM

## 2021-11-09 LAB
ALBUMIN SERPL-MCNC: 3.9 G/DL (ref 3.4–5)
ALBUMIN/GLOB SERPL: 1.1 {RATIO} (ref 0.8–1.7)
ALP SERPL-CCNC: 64 U/L (ref 45–117)
ALT SERPL-CCNC: 24 U/L (ref 16–61)
ANION GAP SERPL CALC-SCNC: 3 MMOL/L (ref 3–18)
AST SERPL-CCNC: 16 U/L (ref 10–38)
BASOPHILS # BLD: 0 K/UL (ref 0–0.1)
BASOPHILS NFR BLD: 0 % (ref 0–2)
BILIRUB SERPL-MCNC: 0.4 MG/DL (ref 0.2–1)
BUN SERPL-MCNC: 9 MG/DL (ref 7–18)
BUN/CREAT SERPL: 10 (ref 12–20)
CALCIUM SERPL-MCNC: 9.3 MG/DL (ref 8.5–10.1)
CHLORIDE SERPL-SCNC: 107 MMOL/L (ref 100–111)
CHOLEST SERPL-MCNC: 134 MG/DL
CO2 SERPL-SCNC: 30 MMOL/L (ref 21–32)
CREAT SERPL-MCNC: 0.9 MG/DL (ref 0.6–1.3)
DIFFERENTIAL METHOD BLD: ABNORMAL
EOSINOPHIL # BLD: 0 K/UL (ref 0–0.4)
EOSINOPHIL NFR BLD: 1 % (ref 0–5)
ERYTHROCYTE [DISTWIDTH] IN BLOOD BY AUTOMATED COUNT: 13 % (ref 11.6–14.5)
EST. AVERAGE GLUCOSE BLD GHB EST-MCNC: 103 MG/DL
GLOBULIN SER CALC-MCNC: 3.6 G/DL (ref 2–4)
GLUCOSE SERPL-MCNC: 89 MG/DL (ref 74–99)
HBA1C MFR BLD: 5.2 % (ref 4.2–5.6)
HCT VFR BLD AUTO: 46.1 % (ref 36–48)
HDLC SERPL-MCNC: 42 MG/DL (ref 40–60)
HDLC SERPL: 3.2 {RATIO} (ref 0–5)
HGB BLD-MCNC: 14.9 G/DL (ref 13–16)
LDLC SERPL CALC-MCNC: 67 MG/DL (ref 0–100)
LIPID PROFILE,FLP: NORMAL
LYMPHOCYTES # BLD: 1.6 K/UL (ref 0.9–3.6)
LYMPHOCYTES NFR BLD: 28 % (ref 21–52)
MCH RBC QN AUTO: 30.4 PG (ref 24–34)
MCHC RBC AUTO-ENTMCNC: 32.3 G/DL (ref 31–37)
MCV RBC AUTO: 94.1 FL (ref 78–100)
MONOCYTES # BLD: 0.4 K/UL (ref 0.05–1.2)
MONOCYTES NFR BLD: 7 % (ref 3–10)
NEUTS SEG # BLD: 3.5 K/UL (ref 1.8–8)
NEUTS SEG NFR BLD: 63 % (ref 40–73)
PLATELET # BLD AUTO: 305 K/UL (ref 135–420)
PMV BLD AUTO: 8.8 FL (ref 9.2–11.8)
POTASSIUM SERPL-SCNC: 4.4 MMOL/L (ref 3.5–5.5)
PROT SERPL-MCNC: 7.5 G/DL (ref 6.4–8.2)
RBC # BLD AUTO: 4.9 M/UL (ref 4.35–5.65)
SODIUM SERPL-SCNC: 140 MMOL/L (ref 136–145)
T4 FREE SERPL-MCNC: 1 NG/DL (ref 0.7–1.5)
TRIGL SERPL-MCNC: 125 MG/DL (ref ?–150)
TSH SERPL DL<=0.05 MIU/L-ACNC: 2.99 UIU/ML (ref 0.36–3.74)
VLDLC SERPL CALC-MCNC: 25 MG/DL
WBC # BLD AUTO: 5.6 K/UL (ref 4.6–13.2)

## 2021-11-09 PROCEDURE — 80053 COMPREHEN METABOLIC PANEL: CPT

## 2021-11-09 PROCEDURE — 83036 HEMOGLOBIN GLYCOSYLATED A1C: CPT

## 2021-11-09 PROCEDURE — 80061 LIPID PANEL: CPT

## 2021-11-09 PROCEDURE — 85025 COMPLETE CBC W/AUTO DIFF WBC: CPT

## 2021-11-09 PROCEDURE — 36415 COLL VENOUS BLD VENIPUNCTURE: CPT

## 2021-11-09 PROCEDURE — 84439 ASSAY OF FREE THYROXINE: CPT

## 2021-11-09 PROCEDURE — 86803 HEPATITIS C AB TEST: CPT

## 2021-11-10 LAB
HCV AB SER IA-ACNC: <0.02 INDEX
HCV AB SERPL QL IA: NEGATIVE
HCV COMMENT,HCGAC: NORMAL

## 2021-11-18 ENCOUNTER — TELEPHONE (OUTPATIENT)
Dept: FAMILY MEDICINE CLINIC | Age: 51
End: 2021-11-18

## 2021-11-18 ENCOUNTER — OFFICE VISIT (OUTPATIENT)
Dept: FAMILY MEDICINE CLINIC | Age: 51
End: 2021-11-18
Payer: MEDICARE

## 2021-11-18 VITALS
TEMPERATURE: 97.2 F | HEART RATE: 69 BPM | WEIGHT: 180 LBS | SYSTOLIC BLOOD PRESSURE: 119 MMHG | RESPIRATION RATE: 16 BRPM | OXYGEN SATURATION: 99 % | DIASTOLIC BLOOD PRESSURE: 84 MMHG | HEIGHT: 71 IN | BODY MASS INDEX: 25.2 KG/M2

## 2021-11-18 DIAGNOSIS — R09.81 NASAL CONGESTION: ICD-10-CM

## 2021-11-18 DIAGNOSIS — A49.9 BACTERIAL INFECTION: ICD-10-CM

## 2021-11-18 DIAGNOSIS — G80.9 CEREBRAL PALSY, UNSPECIFIED TYPE (HCC): ICD-10-CM

## 2021-11-18 DIAGNOSIS — J30.9 ALLERGIC RHINITIS, UNSPECIFIED SEASONALITY, UNSPECIFIED TRIGGER: ICD-10-CM

## 2021-11-18 DIAGNOSIS — J06.9 URTI (ACUTE UPPER RESPIRATORY INFECTION): ICD-10-CM

## 2021-11-18 DIAGNOSIS — R05.9 COUGH: ICD-10-CM

## 2021-11-18 DIAGNOSIS — H61.23 BILATERAL IMPACTED CERUMEN: ICD-10-CM

## 2021-11-18 DIAGNOSIS — Z23 NEEDS FLU SHOT: ICD-10-CM

## 2021-11-18 DIAGNOSIS — Z00.00 MEDICARE ANNUAL WELLNESS VISIT, SUBSEQUENT: Primary | ICD-10-CM

## 2021-11-18 DIAGNOSIS — Z23 ENCOUNTER FOR IMMUNIZATION: ICD-10-CM

## 2021-11-18 PROCEDURE — 90686 IIV4 VACC NO PRSV 0.5 ML IM: CPT | Performed by: LEGAL MEDICINE

## 2021-11-18 PROCEDURE — G0008 ADMIN INFLUENZA VIRUS VAC: HCPCS | Performed by: LEGAL MEDICINE

## 2021-11-18 PROCEDURE — 69209 REMOVE IMPACTED EAR WAX UNI: CPT | Performed by: LEGAL MEDICINE

## 2021-11-18 PROCEDURE — 90750 HZV VACC RECOMBINANT IM: CPT | Performed by: LEGAL MEDICINE

## 2021-11-18 PROCEDURE — 2000F BLOOD PRESSURE MEASURE: CPT | Performed by: LEGAL MEDICINE

## 2021-11-18 PROCEDURE — G8427 DOCREV CUR MEDS BY ELIG CLIN: HCPCS | Performed by: LEGAL MEDICINE

## 2021-11-18 PROCEDURE — 3017F COLORECTAL CA SCREEN DOC REV: CPT | Performed by: LEGAL MEDICINE

## 2021-11-18 PROCEDURE — G9717 DOC PT DX DEP/BP F/U NT REQ: HCPCS | Performed by: LEGAL MEDICINE

## 2021-11-18 PROCEDURE — G0439 PPPS, SUBSEQ VISIT: HCPCS | Performed by: LEGAL MEDICINE

## 2021-11-18 PROCEDURE — G8419 CALC BMI OUT NRM PARAM NOF/U: HCPCS | Performed by: LEGAL MEDICINE

## 2021-11-18 RX ORDER — ACETAMINOPHEN 500 MG
500 TABLET ORAL
Qty: 30 TABLET | Refills: 2 | Status: SHIPPED | OUTPATIENT
Start: 2021-11-18 | End: 2022-10-04 | Stop reason: SDUPTHER

## 2021-11-18 RX ORDER — FLUTICASONE PROPIONATE 50 MCG
2 SPRAY, SUSPENSION (ML) NASAL DAILY
Qty: 1 EACH | Refills: 5 | Status: SHIPPED | OUTPATIENT
Start: 2021-11-18 | End: 2022-10-04 | Stop reason: SDUPTHER

## 2021-11-18 RX ORDER — BISMUTH SUBSALICYLATE 262 MG
TABLET,CHEWABLE ORAL
Qty: 90 TABLET | Refills: 3 | Status: SHIPPED | OUTPATIENT
Start: 2021-11-18 | End: 2022-10-04 | Stop reason: ALTCHOICE

## 2021-11-18 RX ORDER — BACITRACIN ZINC 500 UNIT/G
OINTMENT (GRAM) TOPICAL 2 TIMES DAILY
Qty: 15 G | Refills: 3 | Status: SHIPPED | OUTPATIENT
Start: 2021-11-18 | End: 2022-05-18

## 2021-11-18 RX ORDER — GUAIFENESIN 600 MG/1
600 TABLET, EXTENDED RELEASE ORAL 2 TIMES DAILY
Qty: 20 TABLET | Refills: 5 | Status: SHIPPED | OUTPATIENT
Start: 2021-11-18 | End: 2022-10-04 | Stop reason: SDUPTHER

## 2021-11-18 NOTE — PROGRESS NOTES
(AWV) The Initial Medicare Annual Wellness Exam PROGRESS NOTE    This is an Initial Medicare Annual Wellness Exam (AWV) (Performed 12 months after IPPE or effective date of Medicare Part B enrollment, Once in a lifetime)    I have reviewed the patient's medical history in detail and updated the computerized patient record. Phyllis Lane is a 45 y.o.  male and presents for an annual wellness exam accompanied with his caretaker   He has been seeing urology for elevated PSA   And urine incontinence below is the last  Visit plan   PLAN:   UA today not done, did not void. .  Reviewed most recent PSA of 4.7 on 3/10/21  Follow up in 6 months with fluid restrictions at night and consideration of cystoscopy under anesthesia if fluid therapy does not work. Could consider low dose anticholinergics if prostate is fairly open.         Lab result discussed with patient and caretaker all results are normal    Patient Active Problem List   Diagnosis Code    Anoxic brain damage (HCC) G93.1    H/O traumatic brain injury Z87.820    Abnormal TSH R79.89    Urine frequency R35.0    Ataxia R27.0    Cognitive disorder F09    CP (cerebral palsy) ([facility] [address] G80.9    Insomnia, unspecified G47.00    Major depressive disorder F32.9    Anoxic brain injury ([facility] [address] G93.1    Vertigo R42     Patient Active Problem List    Diagnosis Date Noted    Abnormal TSH 09/10/2019    Urine frequency 09/10/2019    Anoxic brain damage ([address] 06/13/2018    H/O traumatic brain injury 06/13/2018    Ataxia 03/15/2018    Anoxic brain injury ([address] 03/15/2018    Vertigo 03/15/2018    Cognitive disorder 04/13/2016    Insomnia, unspecified 04/13/2016    Major depressive disorder 04/13/2016    CP (cerebral palsy) ([address] 12/01/2011     Current Outpatient Medications   Medication Sig Dispense Refill    multivitamin (Tab-A-Saad) tablet TAKE 1 TABLET ONCE DAILY FOR NUTRITIONAL SUPPLEMENT. NOTIFY MD IF GI DISTRESS OCCURS.  90 Tablet 3    fluticasone propionate (FLONASE) 50 mcg/actuation nasal spray 2 Sprays by Both Nostrils route daily. 1 Each 5    acetaminophen (TYLENOL) 500 mg tablet Take 1 Tablet by mouth every six (6) hours as needed for Pain. And for fever or headaches  Indications: pain 30 Tablet 2    bacitracin zinc (BACITRACIN) ointment Apply  to affected area two (2) times a day. As needed for simple superfecail wounds for 5 days 15 g 3    guaiFENesin ER (MUCINEX) 600 mg ER tablet Take 1 Tablet by mouth two (2) times a day. As needed for cough 20 Tablet 5    sodium chloride (OCEAN) 0.65 % nasal squeeze bottle 0.05 mL by Both Nostrils route as needed for Congestion. 104 mL 11    ARIPiprazole (ABILIFY) 5 mg tablet       fludrocortisone (FLORINEF) 0.1 mg tablet Take 0.1 mg by mouth daily.  temazepam (RESTORIL) 15 mg capsule       FLUoxetine (PROZAC) 20 mg capsule TK 1 C PO QD  5     No Known Allergies  Past Medical History:   Diagnosis Date    Brain an[facility] injury ([facility] [address]     Depression 02/01/2005    recurrent episode, Major Depression    Psychotic disorder ([facility] [address] 12/07/2007    Intellectual disability     Vertigo 03/17/2018     Past Surgical History:   Procedure Laterality Date    HX TRACHEOSTOMY N/A 1985     No family history on file.   Social History     Tobacco Use    Smoking status: Never Smoker    Smokeless tobacco: Never Used   Substance Use Topics    Alcohol use: Yes       ROS     History obtained from   and the patient  General ROS: negative for - chills, fatigue or fever  Psychological ROS: positive for - anxiety, behavioral disorder, concentration difficulties, hostility, memory difficulties and mood swings  negative for - suicidal ideation  Ophthalmic ROS: negative for - blurry vision, decreased vision or double vision  ENT ROS: negative for - epistaxis or headaches  Allergy and Immunology ROS: negative  negative for - hives or itchy/watery eyes  Hematological and Lymphatic ROS: negative for - bleeding problems, blood clots, blood transfusions or bruisin    Respiratory ROS: no cough, shortness of breath, or wheezing  Cardiovascular ROS: no chest pain or dyspnea on exertion  Gastrointestinal ROS: no abdominal pain, change in bowel habits, or black or bloody stools  Genito-Urinary ROS: positive for - incontinence and urinary frequency/urgency    Musculoskeletal ROS: positive for - joint pain, joint stiffness and muscle pain  negative for - joint swelling  Neurological ROS: no TIA or stroke symptoms  Dermatological ROS: negative for - mole changes or skin lesion changes        History     Past Medical History:   Diagnosis Date    Brain anoxic injury ([facility] [address]     Depression 02/01/2005    recurrent episode, Major Depression    Psychotic disorder ([facility] [address] 12/07/2007    Intellectual disability     Vertigo 03/17/2018      Past Surgical History:   Procedure Laterality Date    HX TRACHEOSTOMY N/A 1985     Current Outpatient Medications   Medication Sig Dispense Refill    multivitamin (Tab-A-Saad) tablet TAKE 1 TABLET ONCE DAILY FOR NUTRITIONAL SUPPLEMENT. NOTIFY MD IF GI DISTRESS OCCURS. 90 Tablet 3    fluticasone propionate (FLONASE) 50 mcg/actuation nasal spray 2 Sprays by Both Nostrils route daily. 1 Each 5    acetaminophen (TYLENOL) 500 mg tablet Take 1 Tablet by mouth every six (6) hours as needed for Pain. And for fever or headaches  Indications: pain 30 Tablet 2    bacitracin zinc (BACITRACIN) ointment Apply  to affected area two (2) times a day. As needed for simple superfecail wounds for 5 days 15 g 3    guaiFENesin ER (MUCINEX) 600 mg ER tablet Take 1 Tablet by mouth two (2) times a day. As needed for cough 20 Tablet 5    sodium chloride (OCEAN) 0.65 % nasal squeeze bottle 0.05 mL by Both Nostrils route as needed for Congestion. 104 mL 11    ARIPiprazole (ABILIFY) 5 mg tablet       fludrocortisone (FLORINEF) 0.1 mg tablet Take 0.1 mg by mouth daily.       temazepam (RESTORIL) 15 mg capsule       FLUoxetine (PROZAC) 20 mg capsule TK 1 C PO QD  5     No Known Allergies  No family history on file. Social History     Tobacco Use    Smoking status: Never Smoker    Smokeless tobacco: Never Used   Substance Use Topics    Alcohol use: Yes     Patient Active Problem List   Diagnosis Code    Anoxic brain damage ([facility] G93.1    H/O traumatic brain injury Z87.820    Abnormal TSH R79.89    Urine frequency R35.0    Ataxia R27.0    Cognitive disorder F09    CP (cerebral palsy) ([facility] G80.9    Insomnia, unspecified G47.00    Major depressive disorder F32.9    Anoxic brain injury ([facility] G93.1    Vertigo R42       Health Maintenance History  Immunizations reviewed,    Colonoscopy: done need to get report      Eye exam up to date will request report        Depression Risk Factor Screening:      Patient Health Questionnaire (PHQ-2)   Over the last 2 weeks, how often have you been bothered by any of the following problems? · Little interest or pleasure in doing things? · Not at all. [0]  · Feeling down, depressed, or hopeless? · Not at all. [0]    Total Score: 0/6  PHQ-2 Assessment Scoring:   A score of 2 or more requires further screening with the PHQ-9    Alcohol Risk Factor Screening:     Women: On any occasion during the past 3 months, have you had more than 3 drinks containing alcohol? Do you average more than 7 drinks per week? Men: On any occasion during the past 3 months, have you had more than 4 drinks containing alcohol? No  Do you average more than 14 drinks per week? No     Functional Ability and Level of Safety:     Hearing Loss    Hearing is good. Activities of Daily Living   Partial assistance.    Requires assistance with: reminders to do things he has memory impairment  and no ADLs    Fall Risk   Secondary diagnoses (15 pts)    Abuse Screen   Patient is not abused  None    Examination   Physical Examination  Vitals:    11/18/21 0956   BP: 119/84   Pulse: 69   Resp: 16   Temp: 97.2 °F (36.2 °C)   TempSrc: Temporal SpO2: 99%   Weight: 180 lb (81.6 kg)   Height: 5' 11\" (1.803 m)   PainSc:   0 - No pain      Body mass index is 25.1 kg/m². Evaluation of Cognitive Function:  Mood/affect:Good  Appearance:well groomed   Family member/caregiver input caretaker is here with him and she is helpful with the history intake,    alert, well appearing, and in no distress, oriented to person, place, and time, normal appearing weight and well hydrated    Patient Care Team:  James Green MD as PCP - General (Internal Medicine)  James Green MD as PCP [facility]  Dorotha Duverney, DPM (Podiatry)  Nathaly Purcell MD (Ophthalmology)  Nehemias Aragon MD (Psychiatry)  Cheyanne Umaña MD (Urology)  Lizette Gonzalez MD (Neurology)    End-of-life planning  Advanced Directive in the case than an injury or illness causes the patient to be unable to make health care decisions    Health Care Directive or Living Will: yes    Advice/Referrals/Counselling/Plan:   Education and counseling provided:  Are appropriate based on today's review and evaluation  Influenza Vaccine  Colorectal cancer screening tests  Screening for glaucoma  Include in education list (weight loss, physical activity, smoking cessation, fall prevention, and nutrition)    ICD-10-CM ICD-9-CM    1. Medicare annual wellness visit, subsequent  Z00.00 V70.0    2. Cerebral palsy, unspecified type ([facility] [address]  G80.9 343.9    3. Needs flu shot  Z23 V04.81 INFLUENZA VIRUS VAC QUAD,SPLIT,PRESV FREE SYRINGE IM   4. Encounter for immunization  Z23 V03.89 ZOSTER VACC RECOMBINANT ADJUVANTED   5. URTI (acute upper respiratory infection)  J06.9 465.9 acetaminophen (TYLENOL) 500 mg tablet      guaiFENesin ER (MUCINEX) 600 mg ER tablet   6. Bacterial infection  A49.9 041.9 bacitracin zinc (BACITRACIN) ointment   7. Cough  R05.9 786.2 guaiFENesin ER (MUCINEX) 600 mg ER tablet   8.  Nasal congestion  R09.81 478.19 sodium chloride (OCEAN) 0.65 % nasal squeeze bottle 9. Bilateral impacted cerumen  H61.23 380.4 REMOVAL IMPACTED CERUMEN IRRIGATION/LVG UNILAT   10. Allergic rhinitis, unspecified seasonality, unspecified trigger  J30.9 477.9 fluticasone propionate (FLONASE) 50 mcg/actuation nasal spray     Encounter Diagnoses   Name Primary?  Medicare annual wellness visit, subsequent Yes    [facility]bral pals[facility], unspecified type ([facility] [address]     Needs flu shot     Encounter for immunization     URTI (acute upper respiratory infection)     Bacterial infection     Cough     Nasal congestion     Bilateral impacted cerumen     Allergic rhinitis, unspecified seasonality, unspecified trigger      Orders Placed This Encounter    REMOVAL IMPACTED CERUMEN IRRIGATION/LVG UNILAT    Influenza Virus Vaccine QUAD, PF Syr 6 Months + (Flulaval, Fluzone, Fluarix [id number])    Zoster VACC Recominant Adjuvanted (Shingrix)    multivitamin (Tab-A-Saad) tablet    fluticasone propionate (FLONASE) 50 mcg/actuation nasal spray    acetaminophen (TYLENOL) 500 mg tablet    bacitracin zinc (BACITRACIN) ointment    guaiFENesin ER (MUCINEX) 600 mg ER tablet    sodium chloride (OCEAN) 0.65 % nasal squeeze bottle     Orders Placed This Encounter    REMOVAL IMPACTED CERUMEN IRRIGATION/LVG UNILAT    Influenza Virus Vaccine QUAD, PF Syr 6 Months + (Flulaval, Fluzone, Fluarix 87590)    Zoster VACC Recominant Adjuvanted (Shingrix)    multivitamin (Tab-A-Saad) tablet     Sig: TAKE 1 TABLET ONCE DAILY FOR NUTRITIONAL SUPPLEMENT. NOTIFY MD IF GI DISTRESS OCCURS. Dispense:  90 Tablet     Refill:  3    fluticasone propionate (FLONASE) 50 mcg/actuation nasal spray     Si Sprays by Both Nostrils route daily. Dispense:  1 Each     Refill:  5    acetaminophen (TYLENOL) 500 mg tablet     Sig: Take 1 Tablet by mouth every six (6) hours as needed for Pain.  And for fever or headaches  Indications: pain     Dispense:  30 Tablet     Refill:  2    bacitracin zinc (BACITRACIN) ointment     Sig: Apply  to affected area two (2) times a day. As needed for simple superfecail wounds for 5 days     Dispense:  15 g     Refill:  3    guaiFENesin ER (MUCINEX) 600 mg ER tablet     Sig: Take 1 Tablet by mouth two (2) times a day. As needed for cough     Dispense:  20 Tablet     Refill:  5    sodium chloride (OCEAN) 0.65 % nasal squeeze bottle     Si.05 mL by Both Nostrils route as needed for Congestion. Dispense:  104 mL     Refill:  11     Orders Placed This Encounter    REMOVAL IMPACTED CERUMEN IRRIGATION/LVG UNILAT    Influenza Virus Vaccine QUAD, PF Syr 6 Months + (Flulaval, Fluzone, Fluarix 55968)    Zoster VACC Recominant Adjuvanted (Shingrix)    multivitamin (Tab-A-Saad) tablet    fluticasone propionate (FLONASE) 50 mcg/actuation nasal spray    acetaminophen (TYLENOL) 500 mg tablet    bacitracin zinc (BACITRACIN) ointment    guaiFENesin ER (MUCINEX) 600 mg ER tablet    sodium chloride (OCEAN) 0.65 % nasal squeeze bottle     Diagnoses and all orders for this visit:    1. Medicare annual wellness visit, subsequent    2. Cerebral palsy, unspecified type ([facility] [address]    3. Needs flu shot  -     INFLUENZA VIRUS VAC QUAD,SPLIT,PRESV FREE SYRINGE IM    4. Encounter for immunization  -     ZOSTER VACC RECOMBINANT ADJUVANTED    5. URTI (acute upper respiratory infection)  -     acetaminophen (TYLENOL) 500 mg tablet; Take 1 Tablet by mouth every six (6) hours as needed for Pain. And for fever or headaches  Indications: pain  -     guaiFENesin ER (MUCINEX) 600 mg ER tablet; Take 1 Tablet by mouth two (2) times a day. As needed for cough    6. Bacterial infection  -     bacitracin zinc (BACITRACIN) ointment; Apply  to affected area two (2) times a day. As needed for simple superfecail wounds for 5 days    7. Cough  -     guaiFENesin ER (MUCINEX) 600 mg ER tablet; Take 1 Tablet by mouth two (2) times a day. As needed for cough    8.  Nasal congestion  -     sodium chloride (OCEAN) 0.65 % nasal squeeze bottle; 0.05 mL by Both Nostrils route as needed for Congestion. 9. Bilateral impacted cerumen  -     REMOVAL IMPACTED CERUMEN IRRIGATION/LVG UNILAT    10. Allergic rhinitis, unspecified seasonality, unspecified trigger  -     fluticasone propionate (FLONASE) 50 mcg/actuation nasal spray; 2 Sprays by Both Nostrils route daily. Other orders  -     multivitamin (Tab-A-Saad) tablet; TAKE 1 TABLET ONCE DAILY FOR NUTRITIONAL SUPPLEMENT. NOTIFY MD IF GI DISTRESS OCCURS. Follow-up and Dispositions    · Return in about 6 months (around 5/18/2022). .  Brief written plan, checklist    I have discussed the diagnosis with the patient and the intended plan as seen in the above orders. The patient has received an after-visit summary and questions were answered concerning future plans. I have discussed medication side effects and warnings with the patient as well. I have reviewed the plan of care with the patient, accepted their input and they are in agreement with the treatment goals. Follow-up and Dispositions    · Return in about 6 months (around 5/18/2022).            ____________________________________________________________    Problem Assessment    for treatment of   Chief Complaint   Patient presents with   Soheila Robles Annual Wellness Visit    Medication Refill

## 2021-11-18 NOTE — TELEPHONE ENCOUNTER
Please get eye exam results from    Navdeep Paulson MD      Ophthalmology     Since 11/18/2021     [phone]

## 2021-11-18 NOTE — PROGRESS NOTES
Alexa Enciso is a 45 y.o. male (: 1970) presenting to address:    Chief Complaint   Patient presents with    Annual Wellness Visit    Medication Refill       Vitals:    21 0956   BP: 119/84   Pulse: 69   Resp: 16   Temp: 97.2 °F (36.2 °C)   TempSrc: Temporal   SpO2: 99%   Weight: 180 lb (81.6 kg)   Height: 5' 11\" (1.803 m)   PainSc:   0 - No pain       Hearing/Vision:   No exam data present    Learning Assessment:     Learning Assessment 9/10/2019   PRIMARY LEARNER Patient   HIGHEST LEVEL OF EDUCATION - PRIMARY LEARNER  -   BARRIERS PRIMARY LEARNER -   CO-LEARNER CAREGIVER -   PRIMARY LANGUAGE ENGLISH   LEARNER PREFERENCE PRIMARY READING     LISTENING     VIDEOS   ANSWERED BY patient   RELATIONSHIP SELF     Depression Screening:     3 most recent PHQ Screens 2021   PHQ Not Done -   Little interest or pleasure in doing things Not at all   Feeling down, depressed, irritable, or hopeless Not at all   Total Score PHQ 2 0     Fall Risk Assessment:     Fall Risk Assessment, last 12 mths 9/10/2019   Able to walk? No   Fall in past 12 months? -   Number of falls in past 12 months -   Fall with injury? -     Abuse Screening:     Abuse Screening Questionnaire 2021   Do you ever feel afraid of your partner? N   Are you in a relationship with someone who physically or mentally threatens you? N   Is it safe for you to go home?  Y     ADL Assessment:     ADL Assessment 2021   Feeding yourself No Help Needed   Getting from bed to chair No Help Needed   Getting dressed No Help Needed   Bathing or showering No Help Needed   Walk across the room (includes cane/walker) No Help Needed   Using the telphone Help Needed   Taking your medications Help Needed   Preparing meals Help Needed   Managing money (expenses/bills) Help Needed   Moderately strenuous housework (laundry) Help Needed   Shopping for personal items (toiletries/medicines) Help Needed   Shopping for groceries Help Needed   Driving Help Needed   Climbing a flight of stairs Help Needed   Getting to places beyond walking distances Help Needed        Coordination of Care Questionaire:   1. \"Have you been to the ER, urgent care clinic since your last visit? Hospitalized since your last visit? \" No    2. \"Have you seen or consulted any other health care providers outside of the [address] [place] [address] since your last visit? \" No     3. For patients aged 39-70: Has the patient had a colonoscopy? Yes, HM satisfied with blue hyperlink       Advanced Directive:   1. Do you have an Advanced Directive? NO    2. Would you like information on Advanced Directives?  NO

## 2021-11-18 NOTE — TELEPHONE ENCOUNTER
Please get colonoscopy results from   Dalia Amaya MD  Urology 11/18/2021 End  11/18/21   Phone: [phone];  Fax: [phone]          Since urologist referred the patient to get colonoscopy    Already can redirect asked to the gastroenterologist who did it

## 2021-11-18 NOTE — PROGRESS NOTES
Immunization/s of the flu vaccine and Shingrix vaccine was administered 11/18/2021 by Marcial Justice LPN with guardian's consent. Patient tolerated procedure well. No reactions noted. Ear lavage was completed on bilat ears, successul results; pt tolerated procedure well.

## 2022-03-18 PROBLEM — R79.89 ABNORMAL TSH: Status: ACTIVE | Noted: 2019-09-10

## 2022-03-18 PROBLEM — R42 VERTIGO: Status: ACTIVE | Noted: 2018-03-15

## 2022-03-19 PROBLEM — Z87.820 H/O TRAUMATIC BRAIN INJURY: Status: ACTIVE | Noted: 2018-06-13

## 2022-03-19 PROBLEM — R27.0 ATAXIA: Status: ACTIVE | Noted: 2018-03-15

## 2022-03-19 PROBLEM — G93.1 ANOXIC BRAIN INJURY (HCC): Status: ACTIVE | Noted: 2018-03-15

## 2022-03-19 PROBLEM — G93.1 ANOXIC BRAIN DAMAGE (HCC): Status: ACTIVE | Noted: 2018-06-13

## 2022-03-19 PROBLEM — R35.0 URINE FREQUENCY: Status: ACTIVE | Noted: 2019-09-10

## 2022-05-03 ENCOUNTER — TELEPHONE (OUTPATIENT)
Dept: FAMILY MEDICINE CLINIC | Age: 52
End: 2022-05-03

## 2022-05-03 NOTE — TELEPHONE ENCOUNTER
Ted from [facility] called on behalf of the pt. She is asking if Dr Wendy Strange would be able to write a hold order for May 12 th for the pt's multi vitamins due to patient having a biopsy of his prostate. Jerardo Gonzalez stated that the hold order has to say hold order for procedure on May 12 th can resume on May13 th.  Fax number [phone]

## 2022-05-03 NOTE — TELEPHONE ENCOUNTER
Spoke w/Maribel from [facility] [phone]) and let her know to fax order to PCP to sign; gave our fax number.

## 2022-05-18 ENCOUNTER — OFFICE VISIT (OUTPATIENT)
Dept: FAMILY MEDICINE CLINIC | Age: 52
End: 2022-05-18
Payer: MEDICARE

## 2022-05-18 ENCOUNTER — TELEPHONE (OUTPATIENT)
Dept: FAMILY MEDICINE CLINIC | Age: 52
End: 2022-05-18

## 2022-05-18 VITALS
TEMPERATURE: 98 F | WEIGHT: 184 LBS | RESPIRATION RATE: 15 BRPM | HEIGHT: 71 IN | HEART RATE: 93 BPM | SYSTOLIC BLOOD PRESSURE: 103 MMHG | BODY MASS INDEX: 25.76 KG/M2 | OXYGEN SATURATION: 94 % | DIASTOLIC BLOOD PRESSURE: 71 MMHG

## 2022-05-18 DIAGNOSIS — G93.1 ANOXIC BRAIN DAMAGE (HCC): ICD-10-CM

## 2022-05-18 DIAGNOSIS — F29 PSYCHOSIS, UNSPECIFIED PSYCHOSIS TYPE (HCC): ICD-10-CM

## 2022-05-18 DIAGNOSIS — Z87.820 H/O TRAUMATIC BRAIN INJURY: ICD-10-CM

## 2022-05-18 DIAGNOSIS — G80.9 CEREBRAL PALSY, UNSPECIFIED TYPE (HCC): Primary | ICD-10-CM

## 2022-05-18 DIAGNOSIS — H61.23 BILATERAL IMPACTED CERUMEN: ICD-10-CM

## 2022-05-18 DIAGNOSIS — Z12.11 SCREENING FOR MALIGNANT NEOPLASM OF COLON: ICD-10-CM

## 2022-05-18 DIAGNOSIS — Z23 ENCOUNTER FOR IMMUNIZATION: ICD-10-CM

## 2022-05-18 PROCEDURE — 90750 HZV VACC RECOMBINANT IM: CPT | Performed by: LEGAL MEDICINE

## 2022-05-18 PROCEDURE — G9717 DOC PT DX DEP/BP F/U NT REQ: HCPCS | Performed by: LEGAL MEDICINE

## 2022-05-18 PROCEDURE — 3017F COLORECTAL CA SCREEN DOC REV: CPT | Performed by: LEGAL MEDICINE

## 2022-05-18 PROCEDURE — G8419 CALC BMI OUT NRM PARAM NOF/U: HCPCS | Performed by: LEGAL MEDICINE

## 2022-05-18 PROCEDURE — 99214 OFFICE O/P EST MOD 30 MIN: CPT | Performed by: LEGAL MEDICINE

## 2022-05-18 PROCEDURE — G8752 SYS BP LESS 140: HCPCS | Performed by: LEGAL MEDICINE

## 2022-05-18 PROCEDURE — G8427 DOCREV CUR MEDS BY ELIG CLIN: HCPCS | Performed by: LEGAL MEDICINE

## 2022-05-18 PROCEDURE — G8754 DIAS BP LESS 90: HCPCS | Performed by: LEGAL MEDICINE

## 2022-05-18 PROCEDURE — 69209 REMOVE IMPACTED EAR WAX UNI: CPT | Performed by: LEGAL MEDICINE

## 2022-05-18 RX ORDER — 1.1% SODIUM FLUORIDE PRESCRIPTION DENTAL CREAM 5 MG/G
CREAM DENTAL
COMMUNITY
Start: 2022-05-05

## 2022-05-18 NOTE — PROGRESS NOTES
Scott Casarez     Chief Complaint   Patient presents with   [facility] Ear Fullness    Immunization/Injection     shingrix      Vitals:    05/18/22 1425   BP: 103/71   Pulse: 93   Resp: 15   Temp: 98 °F (36.7 °C)   TempSrc: Temporal   SpO2: 94%   Weight: 184 lb (83.5 kg)   Height: 5' 11\" (1.803 m)   PainSc:   0 - No pain         HPI:Mr. Zara Benoit is here for follow up he is accompanied by Laura Smart from  Human services  BH/DS assistance   He has been complaining about bilateral ear fullness and slight itching possible cerumen impaction    He need his second dose of shingles today  He has been doing well with no acute changes    Cologuard was ordered in November and has not been completed    Past Medical History:   Diagnosis Date    [facility]oxic [facility] ([facility]     Depression 02/01/2005    recurrent episode, Major Depression    Psychotic disorder ([facility] [address] 12/07/2007    Intellectual disability     Vertigo 03/17/2018     Past Surgical History:   Procedure Laterality Date    HX TRACHEOSTOMY N/A 1985    HX UROLOGICAL  05/12/2022    CYSTOSCOPY; TRANSPERINEAL PROSTATE BIOPSY; Dr. Arash Nguyen History     Tobacco Use    Smoking status: Never Smoker    Smokeless tobacco: Never Used   Substance Use Topics    Alcohol use: Yes       Family History   Family history unknown: Yes       Review of Systems   Constitutional: Negative for chills, diaphoresis, fever, malaise/fatigue and weight loss. HENT: Negative for congestion, ear discharge, ear pain, hearing loss, nosebleeds, sinus pain and sore throat. Eyes: Negative for blurred vision, double vision, pain, discharge and redness. Respiratory: Negative for cough, hemoptysis, sputum production, shortness of breath and wheezing. Cardiovascular: Negative for chest pain, palpitations, claudication and leg swelling. Gastrointestinal: Negative for abdominal pain, constipation, diarrhea, nausea and vomiting.    Genitourinary: Negative for dysuria, flank pain, frequency, hematuria and urgency. Musculoskeletal: Negative for myalgias. Skin: Negative for itching and rash. Neurological: Positive for dizziness. Negative for tingling, sensory change, speech change, focal weakness, weakness and headaches. Psychiatric/Behavioral: Negative for depression, hallucinations and suicidal ideas. Physical Exam  Vitals and nursing note reviewed. Constitutional:       General: He is not in acute distress. Appearance: He is well-developed. He is not diaphoretic. HENT:      Head: Normocephalic and atraumatic. Right Ear: External ear normal. There is impacted cerumen. Left Ear: External ear normal. There is impacted cerumen. Mouth/Throat:      Pharynx: No oropharyngeal exudate. Eyes:      General: No scleral icterus. Right eye: No discharge. Left eye: No discharge. Neck:      Thyroid: No thyromegaly. Cardiovascular:      Rate and Rhythm: Normal rate and regular rhythm. Heart sounds: Normal heart sounds. Pulmonary:      Effort: Pulmonary effort is normal. No respiratory distress. Breath sounds: No wheezing, rhonchi or rales. Chest:      Chest wall: No tenderness. Abdominal:      General: Bowel sounds are normal. There is no distension. Palpations: Abdomen is soft. There is no mass. Tenderness: There is no abdominal tenderness. There is no rebound. Musculoskeletal:         General: No tenderness or deformity. Normal range of motion. Cervical back: Normal range of motion and neck supple. Lymphadenopathy:      Cervical: No cervical adenopathy. Skin:     General: Skin is warm and dry. Findings: No erythema or rash. Neurological:      General: No focal deficit present. Mental Status: He is alert and oriented to person, place, and time. Mental status is at baseline. Cranial Nerves: No cranial nerve deficit. Coordination: Coordination normal.   Psychiatric:         Thought Content:  Thought content normal.         Judgment: Judgment normal.          Assessment and plan     Plan of care has been discussed with the patient, he agrees to the plan and verbalized understanding. All his questions were answered  More than 50% of the time spent in this visit was counseling the patient about  illness and treatment options         1. Encounter for immunization  Vaccine was given with no complications  - ZOSTER VACC RECOMBINANT ADJUVANTED    2. Cerebral palsy, unspecified type ([address]  He has been stable no acute changes    3. H/O traumatic brain injury  Stable    4. Anoxic brain damage (HCC)      5. Bilateral impacted cerumen  Bilateral cerumen irrigation was done by the nurse Christopher Parrot  - REMOVAL IMPACTED CERUMEN IRRIGATION/LVG UNILAT    Colon cancer screening  Cologuard was ordered last November it has not been completed  His caregiver will get back to us regarding whether we should reorder echo patient will go for colonoscopy after checking with his guardian    Current Outpatient Medications   Medication Sig Dispense Refill    SF 5000 Plus 1.1 % crea       trimethoprim-sulfamethoxazole (BACTRIM DS, SEPTRA DS) 160-800 mg per tablet Take 1 Tablet by mouth two (2) times a day for 7 days. 14 Tablet 0    multivitamin (Tab-A-Saad) tablet TAKE 1 TABLET ONCE DAILY FOR NUTRITIONAL SUPPLEMENT. NOTIFY MD IF GI DISTRESS OCCURS. 90 Tablet 3    fluticasone propionate (FLONASE) 50 mcg/actuation nasal spray 2 Sprays by Both Nostrils route daily. 1 Each 5    acetaminophen (TYLENOL) 500 mg tablet Take 1 Tablet by mouth every six (6) hours as needed for Pain. And for fever or headaches  Indications: pain 30 Tablet 2    guaiFENesin ER (MUCINEX) 600 mg ER tablet Take 1 Tablet by mouth two (2) times a day. As needed for cough 20 Tablet 5    sodium chloride (OCEAN) 0.65 % nasal squeeze bottle 0.05 mL by Both Nostrils route as needed for Congestion.  104 mL 11    ARIPiprazole (ABILIFY) 5 mg tablet       fludrocortisone (FLORINEF) 0.1 mg tablet Take 0.1 mg by mouth daily.  temazepam (RESTORIL) 15 mg capsule       FLUoxetine (PROZAC) 20 mg capsule TK 1 C PO QD  5       Patient Active Problem List    Diagnosis Date Noted    Abnormal TSH 09/10/2019    Urine frequency 09/10/2019    Anoxic brain damage ([facility] [address] 06/13/2018    H/O traumatic brain injury 06/13/2018    Ataxia 03/15/2018    [facility] 03/15/2018    Vertigo 03/15/2018    Cognitive disorder 04/13/2016    Insomnia, unspecified 04/13/2016    Major depressive disorder 04/13/2016    CP (cerebral palsy) ([facility] [address] 12/01/2011    Essential hypertension 01/01/1901    Psychotic disorder ([facility] 01/01/1901    Sleep disorder 01/01/1901    Syncope and collapse 01/01/1901     Results for orders placed or performed in visit on 05/12/22   PROSTATE BIOPSY   Result Value Ref Range    APRESULT AP results      Orders Only on 05/12/2022   Component Date Value Ref Range Status    APRESULT 05/12/2022 AP results   Final    Comment:    SURGICAL PATHOLOGY REPORT     CLINICAL INFORMATION:  Previous Biopsy Result:  No Previous Biopsy Performed  Treatment:  N/A  CAYLA:  Normal CAYLA  Other Pertinent Clinical History:  MRI pelvis 8/12/21: PIRADS 2; DNA kit # [id number]  Last PSA result:  4.67  Last PSA date:  03/08/2022  Number of Containers Submitted:  12     DIAGNOSIS:  A. Right [place] Needle biopsy                 Benign prostatic tissue. B. Right Mid Needle biopsy                 Benign prostatic tissue. C. Right Base Needle biopsy                 Benign prostatic tissue. D. Right Lateral [place] Needle biopsy                 Benign prostatic tissue. E. Right Lateral Mid Needle biopsy                 Benign prostatic tissue. F. Right Lateral Base Needle biopsy                 Benign prostatic tissue. G. Left [place] Needle biopsy                 Benign prostatic tissue.    H. Left Mid Needle biopsy                 ADENOCARCINOMA, DANUTA SCORE 3 + 3 = 6 involving 10 % of the specimen (1 of 1 core(s) positive). Grade Group 1. Ends not involved by the tumor. I. Left Base Needle biopsy                 Benign prostatic tissue. J. Left Lateral [place] Needle biopsy                 Benign prostatic tissue. K. Left Lateral Mid Needle biopsy                 Benign prostatic tissue. L. Left Lateral Base Needle biopsy                 Benign prostatic tissue. GROSS DESCRIPTION:  SiteNo. of CoresCore 1 cmCore 2 cmCore 3 cmCore 4 cmNo. of cassettesSectioningInking  ARight Apex11. 21Blue  BRight Mid21.30. 91Green  CRight Base11. 11Black  DRight Lateral Apex11. 01Blue  ERight Lateral Mid11. 41Green  FRight Lateral Base11. 51Black  GLeft Apex11. 61Blue  HLeft Mid11. 41Green  ILeft Base11. 21Black  JLeft Lateral Apex11. 11Blue  KLeft Lateral Mid11. 01Green  LLeft Lateral Base10. 91Black     Note: The length and number of cores reported may differ from that noted during the processing proc                           edure or on receipt by the laboratory due to tissue shrinkage and/or fragmentation during processing. Immunhistochemical testing, when applicable, has been developed and its performance determined by the Immunohistochemistry Laboratory. It has not been cleared or approved by the U.S. Food and Drug Administration (FDA). The FDA has determined that such   clearance or approval is not necessary. These tests should be used as an aid for clinical purposes and integrated with other findings. This laboratory is certified to perform high complexity testing under the Clinical Laboratory Improvement Amendments of   1988.           Electronically Signed by [email or web address] NEHEMIAS Sequeira MD            Current Diagnosis Submitted by Ordering Physician: R18.68     Clinical Support on 04/27/2022   Component Date Value Ref Range Status    FINAL REPORT 04/27/2022 Microbiology results   Final    Comment: ANTIBIOTIC ALLERGIES  No Known Antibiotic Allergies    URINE SOURCE:  Clean Catch    COLONY COUNT / RESULT:   1,000 cfu/ml Mixed Urogenital / Skin Olivia     Office Visit on 03/15/2022   Component Date Value Ref Range Status    Color (UA POC) 03/15/2022 Yellow   Final    Clarity (UA POC) 03/15/2022 Clear   Final    Glucose (UA POC) 03/15/2022 Negative  Negative Final    Bilirubin (UA POC) 03/15/2022 Negative  Negative Final    Ketones (UA POC) 03/15/2022 Negative  Negative Final    Specific gravity (UA POC) 03/15/2022 1.015  1.001 - 1.035 Final    Blood (UA POC) 03/15/2022 Trace  Negative Final    pH (UA POC) 03/15/2022 7.0  4.6 - 8.0 Final    Protein (UA POC) 03/15/2022 Negative  Negative Final    Urobilinogen (UA POC) 03/15/2022 0.2 mg/dL  0.2 - 1 Final    Nitrites (UA POC) 03/15/2022 Negative  Negative Final    Leukocyte esterase (UA POC) 03/15/2022 Negative  Negative Final    PVR 03/15/2022 118  cc Final   Clinical Support on 03/08/2022   Component Date Value Ref Range Status    Prostate Specific Ag 03/08/2022 4.67* 0.00 - 3.50 ng/mL Final    Comment: (Methodology: Roche ECLIA)             Total time spent at this visit is 30 minutes

## 2022-05-18 NOTE — PROGRESS NOTES
Myra Marie is a 45 y.o. male (: 1970) presenting to address:    Chief Complaint   Patient presents with    Ear Fullness    Immunization/Injection     shingrix        Vitals:    22 1425   BP: 103/71   Pulse: 93   Resp: 15   Temp: 98 °F (36.7 °C)   TempSrc: Temporal   SpO2: 94%   Weight: 184 lb (83.5 kg)   Height: 5' 11\" (1.803 m)   PainSc:   0 - No pain       Hearing/Vision:   No exam data present    Learning Assessment:     Learning Assessment 9/10/2019   PRIMARY LEARNER Patient   HIGHEST LEVEL OF EDUCATION - PRIMARY LEARNER  -   BARRIERS PRIMARY LEARNER -   CO-LEARNER CAREGIVER -   PRIMARY LANGUAGE ENGLISH   LEARNER PREFERENCE PRIMARY READING     LISTENING     VIDEOS   ANSWERED BY patient   RELATIONSHIP SELF     Depression Screening:     3 most recent PHQ Screens 2022   PHQ Not Done -   Little interest or pleasure in doing things Not at all   Feeling down, depressed, irritable, or hopeless Not at all   Total Score PHQ 2 0     Fall Risk Assessment:     Fall Risk Assessment, last 12 mths 9/10/2019   Able to walk? No   Fall in past 12 months? -   Number of falls in past 12 months -   Fall with injury? -     Abuse Screening:     Abuse Screening Questionnaire 2021   Do you ever feel afraid of your partner? N   Are you in a relationship with someone who physically or mentally threatens you? N   Is it safe for you to go home?  Y     ADL Assessment:     ADL Assessment 2021   Feeding yourself No Help Needed   Getting from bed to chair No Help Needed   Getting dressed No Help Needed   Bathing or showering No Help Needed   Walk across the room (includes cane/walker) No Help Needed   Using the telphone Help Needed   Taking your medications Help Needed   Preparing meals Help Needed   Managing money (expenses/bills) Help Needed   Moderately strenuous housework (laundry) Help Needed   Shopping for personal items (toiletries/medicines) Help Needed   Shopping for groceries Help Needed Driving Help Needed   Climbing a flight of stairs Help Needed   Getting to places beyond walking distances Help Needed        Coordination of Care Questionaire:   1. \"Have you been to the ER, urgent care clinic since your last visit? Hospitalized since your last visit? \" No    2. \"Have you seen or consulted any other health care providers outside of the [address] [place] [address] [place] since your last visit? \" No     3. For patients aged 39-70: Has the patient had a colonoscopy? No     If the patient is female:    4. For patients aged 41-77: Has the patient had a mammogram within the past 2 years? NA - based on age    11. For patients aged 21-65: Has the patient had a pap smear? NA - based on age    Advanced Directive:   1. Do you have an Advanced Directive? NO    2. Would you like information on Advanced Directives?  NO

## 2022-05-18 NOTE — PROGRESS NOTES
Immunization of the 2nd Shingles vaccine was administered 5/18/2022 by Koby Landrum LPN with guardian's consent. Patient tolerated procedure well. No reactions noted.

## 2022-05-19 ENCOUNTER — HOSPITAL ENCOUNTER (OUTPATIENT)
Dept: PHYSICAL THERAPY | Age: 52
Discharge: HOME | End: 2022-05-19
Payer: MEDICARE

## 2022-05-19 PROCEDURE — 97162 PT EVAL MOD COMPLEX 30 MIN: CPT

## 2022-05-19 NOTE — PROGRESS NOTES
. [facility] [address] THERAPY   [facility], [address],[facility], [facility] - Phone: [phone]  Fax: [phone] OF CARE / [address] [facility]  Patient Name: Samara Browning : 1970   Medical   Diagnosis: Ataxic gait [R26.0] Treatment Diagnosis: Ataxic gait [R26.0]   Onset Date: 38 yrs ago     Referral Source: Ben Mcclure* Start of Care [facility]): 2022   Prior Hospitalization: See medical history Provider #: [id number]   Prior Level of Function: Unsteadiness in gait, poor coordination   Comorbidities: TBI at age 12. Muscle spasms, LBP   Medications: Verified on Patient Summary List   The Plan of Care and following information is based on the information from the initial evaluation.   ===========================================================================================  Subjective: Subjective taken via . Pt has ~3 minute memory tolerance, is unsteady and has muscle spasms that can come on and he will be frozen and then in 3 minutes they will go away and he's fine. Says having trouble getting on the transportation bus for activities, and coordination and muscle spasms. Pt reports he has memory problems. Assessment / key information:  Pt presents to [facility]  with signs and symptoms congruent with a diagnosis of Ataxia with global aphasia and reduced executive function in motor planning and decision making. Pt with 1-2 step command following, and is compliant to instruction and demo. Pt presents with hot red left knee, indicative of likely recent fall, although a fall was not reported. Patient would benefit from skilled intervention to address the above deficits, improve ability to step and transfer on/off bus. .    OBJECTIVE:   Gait/Posture: Pt demonstrates Bilateral LE flexor synergy with assymetrical gait when walking on flat ground, more noticeable flexor synergy patterns when given a cognitive task. Stride width is narrow with apparent shuffling of gait when increasing speed. GUTIERREZ Balance Scale: 35/56  Dizziness Handicap Inventory (DHI):Denies dizziness  Stiars: Safe with 2x hand rail and attention to task  NBOS, sway, unsteady. NBOS with HT, VHT, requires UE assist.     ===========================================================================================  Eval Complexity: History MEDIUM  Complexity : 1-2 comorbidities / personal factors will impact the outcome/ POC ;  Examination  HIGH Complexity : 4+ Standardized tests and measures addressing body structure, function, activity limitation and / or participation in recreation ; Presentation MEDIUM Complexity : Evolving with changing characteristics ; Decision Making MEDIUM Complexity : FOTO score of 26-74; Overall Complexity MEDIUM  Problem List: impaired gait/ balance and decrease ADL/ functional abilitiies   Treatment Plan may include any combination of the following: Therapeutic exercise, Therapeutic activities, Neuromuscular re-education, Physical agent/modality, Gait/balance training, Manual therapy, Patient education, Self Care training, Functional mobility training, Home safety training and Stair training  Patient / Family readiness to learn indicated by: trying to perform skills and interest  Persons(s) to be included in education: patient (P) and family support person (FSP);list   Barriers to Learning/Limitations: yes;  language, cognitive, sensory deficits-vision/hearing/speech and altered mental status (i.e.Sedation, Confusion)  Measures taken, if barriers to learning: Single/double step commands, TCs, VCs, Demo. Patient Goal (s): Improve safety to engage in community   Patient self reported health status: good  Rehabilitation Potential: good   Short Term Goals:  To be accomplished in  1  weeks  STG1 Pt to improve 6\" step ups to one hand use to improve ability to get on the bus.  Long Term Goals: To be accomplished in  3  weeks  LTG1 Pt to reports less muscle spasm frequency in the day (per  and patient agreement) to improve QOL  LTG2 Pt to indicate a 4+ on the GROC, \"moderately better\"  LTG3 Pt to deny falls over the past 4 weeks to indicate reduction in falls risk  LTG4 Pt to report on/off the bus without complication at a 52% consistency rate to indicate improved safety for transit    Frequency / Duration:   Patient to be seen  2  times per week for 3  weeks  Patient / Caregiver education and instruction: self care  Therapist Signature: Axel Philip, PT Date: 2/71/9950   Certification Period: 5/19/22 - 8/17/22 Time: 5:25 PM   ===========================================================================================  I certify that the above Physical Therapy Services are being furnished while the patient is under my care. I agree with the treatment plan and certify that this therapy is necessary. Physician Signature:        Date:       Time:                                        Brando Booth*  Please sign and return to [facility] at [facility]. or you may fax the signed copy to [phone]. Thank you.

## 2022-05-19 NOTE — PROGRESS NOTES
PHYSICAL THERAPY - DAILY TREATMENT NOTE    Patient Name: Campos Jaeger        Date: 2022  : 1970   YES Patient  Verified  Visit #:   1   of   6  Insurance: Payor: Julio Cesar Sky / Plan: [place] MEDICARE PART A & B / Product Type: Medicare /      In time: 6:00 Out time: 6:40   Total Treatment Time: 40     Medicare/BCBS Time Tracking (below)   Total Timed Codes (min):  NB 1:1 Treatment Time:  40     TREATMENT AREA =  Ataxic gait [R26.0]    SUBJECTIVE    Pain Level (on 0 to 10 scale):  5  / 10   Medication Changes/New allergies or changes in medical history, any new surgeries or procedures? NO    If yes, update Summary List   Subjective Functional Status/Changes:  []  No changes reported     See POC          OBJECTIVE    20 NB min NeuroMuscualr Re-Ed / TherAct:  [x]  See flow sheet   Rationale:      increase ROM, increase strength and improve coordination to improve the patients ability to step and balance and transfer     10 NB min Self Care: Reviewed diagnosis, prognosis, therapy progression   Rationale:    Improve understanding of injury and therapy to have realistic expectation of therapy to improve compliance/adherence and satisfaction    Billed With/As:   [] TE   [] TA   [] Neuro   [x] Self Care Patient Education: [x] Review HEP    [] Progressed/Changed HEP based on:   [x] Addressed barriers and behaviors     [] Therapeutic Neuroscience Pain Education, metaphor, reframing, contexts.     [x] Sleep Education   [] Body Mechanics [x] Healing Timeframe     [] Self STM with ball at \"the spot\"  [x] Walking Program/Global Activity   [] other:         Other Objective/Functional Measures:    See POC note     Post Treatment Pain Level (on 0 to 10) scale:    10     ASSESSMENT  Assessment/Changes in Function:     See POC     []  See Progress Note/Recertification   Patient will continue to benefit from skilled PT services to modify and progress therapeutic interventions, address functional mobility deficits, address ROM deficits, address strength deficits, analyze and address soft tissue restrictions, analyze and cue movement patterns, analyze and modify body mechanics/ergonomics and assess and modify postural abnormalities to attain goals per POC. Progress toward goals / Updated goals:    Pt evaluated today.  See POC     PLAN  [x]  Upgrade activities as tolerated YES Continue plan of care   []  Discharge due to :    []  Other:      Therapist: Rosaura Ayala PT, DPT, OCS    Date: 5/19/2022 Time: 5:25 PM

## 2022-05-23 ENCOUNTER — TELEPHONE (OUTPATIENT)
Dept: FAMILY MEDICINE CLINIC | Age: 52
End: 2022-05-23

## 2022-05-23 DIAGNOSIS — G80.9 CEREBRAL PALSY, UNSPECIFIED TYPE (HCC): ICD-10-CM

## 2022-05-23 DIAGNOSIS — Z00.00 MEDICARE ANNUAL WELLNESS VISIT, SUBSEQUENT: Primary | ICD-10-CM

## 2022-05-23 DIAGNOSIS — I10 ESSENTIAL HYPERTENSION: ICD-10-CM

## 2022-05-23 DIAGNOSIS — R94.6 ABNORMAL RESULTS OF THYROID FUNCTION STUDIES: ICD-10-CM

## 2022-05-23 DIAGNOSIS — R73.09 ABNORMAL BLOOD SUGAR: ICD-10-CM

## 2022-05-23 DIAGNOSIS — Z13.220 LIPID SCREENING: ICD-10-CM

## 2022-05-23 NOTE — TELEPHONE ENCOUNTER
Pt is scheduled for labs and [address] [facility] labs.     Future Appointments   Date Time Provider Jem Ruiz   5/24/2022  3:15 PM Patricia Villagomez MD [facility]   6/6/2022  4:30 PM Preston Reyna, PT [facility]   6/9/2022  4:00 PM Antonietta Andres, PT [facility]   6/14/2022  5:15 PM Rise Alfonso, PT [facility]   6/16/2022  5:15 PM Rise Alfonso, PT [facility]   6/21/2022  5:15 PM Rise Alfonso, PT [facility]   6/23/2022  6:00 PM Rise Alfonso, PT [facility]   11/2/2022  9:00 AM LAB_[facility] BS AMB   11/9/2022 10:45 AM Miranda Allen MD [facility] BS AMB   '

## 2022-06-06 ENCOUNTER — HOSPITAL ENCOUNTER (OUTPATIENT)
Dept: PHYSICAL THERAPY | Age: 52
Discharge: HOME | End: 2022-06-06
Payer: MEDICARE

## 2022-06-06 PROCEDURE — 97110 THERAPEUTIC EXERCISES: CPT

## 2022-06-06 PROCEDURE — 97112 NEUROMUSCULAR REEDUCATION: CPT

## 2022-06-06 NOTE — PROGRESS NOTES
PHYSICAL THERAPY - DAILY TREATMENT NOTE    Patient Name: Myra Marie        Date: 2022  : 1970   yes Patient  Verified  Visit #:   2   of   6  Insurance: Payor: Ana Paz / Plan: [place] MEDICARE PART A & B / Product Type: Medicare /      In time: 909 Out time: 510   Total Treatment Time: 38     Medicare/BCBS Time Tracking (below)   Total Timed Codes (min):  38 1:1 Treatment Time:  38     TREATMENT AREA =  Ataxic gait [R26.0]    SUBJECTIVE  Pain Level (on 0 to 10 scale):  0  / 10   Medication Changes/New allergies or changes in medical history, any new surgeries or procedures?    no  If yes, update Summary List   Subjective Functional Status/Changes:  []  No changes reported      says his gait is off balance. OBJECTIVE  10 min Therapeutic Exercise:  [x]  See flow sheet   Rationale:      increase ROM and increase strength to improve the patients ability to perform transfers. 28 min Neuromuscular Re-ed: [x]  See flow sheet   Rationale:    increase strength, improve coordination, improve balance and increase proprioception to improve the patients ability to perform transfers and decrease risk of falls. Billed With/As:   [x] TE   [] TA   [] Neuro   [] Self Care Patient Education: [x] Review HEP    [] Progressed/Changed HEP based on:   [] positioning   [] body mechanics   [] transfers   [] heat/ice application    [] other:      Other Objective/Functional Measures:    See flow sheet for exercises performed     Post Treatment Pain Level (on 0 to 10) scale:   0  / 10     ASSESSMENT  Assessment/Changes in Function:     Chart reviewed and subjective taken. Pt required multiple VC for task direction and form secondary to memory issues. Did well with demonstration and consistent feedback on task.  Able to perform NBOS with EC and on foam- may progress challenge NV.     []  See Progress Note/Recertification   Patient will continue to benefit from skilled PT services to modify and progress therapeutic interventions, address functional mobility deficits and analyze and cue movement patterns to attain remaining goals.    Progress toward goals / Updated goals:    No progress towards LTGs this date     PLAN  [x]  Upgrade activities as tolerated yes Continue plan of care   []  Discharge due to :    []  Other:      Therapist: Sue Gilman PT, DPT    Date: 6/6/2022 Time: 1:53 PM     Future Appointments   Date Time Provider Jem Sara   6/6/2022  4:30 PM Fabrizio Galindo, PT [facility]   6/9/2022  4:00 PM Aaron Hankins, PT [facility]   6/14/2022  5:15 PM Claudia Guillermo, PT [facility]   6/16/2022  5:15 PM Claudia Guillermo, PT [facility]   6/21/2022  5:15 PM Claudia Guillermo, PT [address]   6/23/2022  6:00 PM Claudia Guillermo, PT [facility]   11/2/2022  9:00 AM LAB_[facility] BS AMB   11/4/2022 11:10 AM [facility]   11/9/2022 10:45 AM MD REYES Callahan BS AMB   11/11/2022  3:15 PM Lori Prieto MD [place]

## 2022-06-09 ENCOUNTER — HOSPITAL ENCOUNTER (OUTPATIENT)
Dept: PHYSICAL THERAPY | Age: 52
Discharge: HOME | End: 2022-06-09
Payer: MEDICARE

## 2022-06-09 PROCEDURE — 97110 THERAPEUTIC EXERCISES: CPT

## 2022-06-09 PROCEDURE — 97112 NEUROMUSCULAR REEDUCATION: CPT

## 2022-06-09 NOTE — PROGRESS NOTES
PHYSICAL THERAPY - DAILY TREATMENT NOTE    Patient Name: Jose Alejandro Colon        Date: 2022  : 1970   yes Patient  Verified  Visit #:   3   of   6  Insurance: Payor: Jorge Alberto Smalls [place] / Plan: 72 [employer] MCR / Product Type: Managed Care Medicare /      In time: 017 Out time: 444   Total Treatment Time: 33     Medicare/BCBS Time Tracking (below)   Total Timed Codes (min):  33 1:1 Treatment Time:  33     TREATMENT AREA =  Ataxic gait [R26.0]    SUBJECTIVE  Pain Level (on 0 to 10 scale):  0  / 10   Medication Changes/New allergies or changes in medical history, any new surgeries or procedures?    no  If yes, update Summary List   Subjective Functional Status/Changes:  []  No changes reported     Patient and  arriving late to today's appointment. Patient states his balance is \"horrible\", but denies complications since his LV. OBJECTIVE  20 min Therapeutic Exercise:  [x]  See flow sheet   Rationale:      increase ROM and increase strength to improve the patients ability to perform household activities. 13 min Neuromuscular Re-ed: [x]  See flow sheet   Rationale:    improve coordination, improve balance and increase proprioception to improve the patients ability to perform standing activiites.      Billed With/As:   [x] TE   [] TA   [] Neuro   [] Self Care Patient Education: [x] Review HEP    [] Progressed/Changed HEP based on:   [] positioning   [] body mechanics   [] transfers   [] heat/ice application    [] other:      Other Objective/Functional Measures:    1:1 TE 20'  Modified today's session as patient arrived late to appointment  Required consistent cuing during balance exercises in parallel bars to reduce UE support on bars  Completed figure 8 cone drill well, however required multi step turning in order to complete task     Post Treatment Pain Level (on 0 to 10) scale:   0  / 10     ASSESSMENT  Assessment/Changes in Function:     Patient denied changes in symptoms post session. Patient would benefit from continued PT in order to further improve static and dynamic balance. []  See Progress Note/Recertification   Patient will continue to benefit from skilled PT services to modify and progress therapeutic interventions, address functional mobility deficits, address ROM deficits, address strength deficits, analyze and address soft tissue restrictions, analyze and cue movement patterns, analyze and modify body mechanics/ergonomics and assess and modify postural abnormalities to attain remaining goals.    Progress toward goals / Updated goals:    No significant progress with LTG's this session     PLAN  [x]  Upgrade activities as tolerated yes Continue plan of care   []  Discharge due to :    []  Other:      Therapist: Magdaleno Duke PT    Date: 6/9/2022 Time: 4:49 PM     Future Appointments   Date Time Provider Jem Ruiz   6/14/2022  5:15 PM Romayne Dunk, PT [address]   6/16/2022  5:15 PM Romayne Dunk, PT [facility]   6/21/2022  5:15 PM Romayne Dunk, PT [address]   6/23/2022  6:00 PM Romayne Dunk, PT [facility]   11/2/2022  9:00 AM [facility] BS AMB   11/4/2022 11:10 AM [facility]   11/9/2022 10:45 AM MD REYES Miller BS AMB   11/11/2022  3:15 PM MD Daron Blancas

## 2022-06-14 ENCOUNTER — HOSPITAL ENCOUNTER (OUTPATIENT)
Dept: PHYSICAL THERAPY | Age: 52
Discharge: HOME | End: 2022-06-14
Payer: MEDICARE

## 2022-06-14 PROCEDURE — 97530 THERAPEUTIC ACTIVITIES: CPT

## 2022-06-14 PROCEDURE — 97112 NEUROMUSCULAR REEDUCATION: CPT

## 2022-06-14 NOTE — PROGRESS NOTES
PHYSICAL THERAPY - DAILY TREATMENT NOTE    Patient Name: Shari Pat        Date: 2022  : 1970   yes Patient  Verified  Visit #:   4   of   7  Insurance: Payor: Jorge Alberto Smalls [place] / Plan: 72 [employer] MCR / Product Type: Managed Care Medicare /      In time: 5:15 Out time: 6:00   Total Treatment Time: 45     Medicare/BCBS Time Tracking (below)   Total Timed Codes (min):  45 1:1 Treatment Time:  45     TREATMENT AREA =  Ataxic gait [R26.0]    SUBJECTIVE  Pain Level (on 0 to 10 scale):  0  / 10   Medication Changes/New allergies or changes in medical history, any new surgeries or procedures?    no  If yes, update Summary List   Subjective Functional Status/Changes:  []  No changes reported     Pt's caregiver denies falls or difficulty in transportation. OBJECTIVE  15 min Therapeutic Activity: [x]  See flow sheet   Rationale:    increase strength and improve coordination to improve the patients ability to step and reach    30 min Neuromuscular Re-ed: [x]  See flow sheet   Rationale:    improve coordination, improve balance and increase proprioception to improve the patients ability to perform standing activiites. Billed With/As:   [x] TE   [] TA   [] Neuro   [] Self Care Patient Education: [x] Review HEP    [] Progressed/Changed HEP based on:   [] positioning   [] body mechanics   [] transfers   [] heat/ice application    [] other:      Other Objective/Functional Measures:    Gait drills: With cognitive tasks. Water-cup carry, retro stepping, EC, Crossover step, obstacle course with hurdles/steps, airex, Gait with HT/VCs. Cone reaching with color stimulus, decision making. Post Treatment Pain Level (on 0 to 10) scale:   0  / 10     ASSESSMENT  Assessment/Changes in Function:     Chart reviewed and subjective taken. Pt with good response to TCs, VCs and demo. 1-2 step commands and frequent coaching through for therapeutic focus.       []  See Progress Note/Recertification   Patient will continue to benefit from skilled PT services to modify and progress therapeutic interventions, address functional mobility deficits, address ROM deficits, address strength deficits, analyze and address soft tissue restrictions, analyze and cue movement patterns, analyze and modify body mechanics/ergonomics and assess and modify postural abnormalities to attain remaining goals.    Progress toward goals / Updated goals:    Progressed challenges in session     PLAN  [x]  Upgrade activities as tolerated yes Continue plan of care   []  Discharge due to :    []  Other:      Therapist: Bryce Lea PT    Date: 6/14/2022 Time: 4:49 PM     Future Appointments   Date Time Provider Jem Ruiz   6/14/2022  5:15 PM Joelle Umaña, PT [address]   6/16/2022  5:15 PM Joelle Umaña, PT [facility]   6/21/2022  5:15 PM Joelle Umaña, PT [address]   6/23/2022  6:00 PM Joelle Umaña, PT [address]   11/2/2022  9:00 AM LAB_[facility] BS AMB   11/4/2022 11:10 AM [facility] NURSE [facility] LESLYE SCHED   11/9/2022 10:45 AM Ml Bui MD BS[facility] BS AMB   11/11/2022  3:15 PM Real Martinez MD [facility]

## 2022-06-16 ENCOUNTER — HOSPITAL ENCOUNTER (OUTPATIENT)
Dept: PHYSICAL THERAPY | Age: 52
Discharge: HOME | End: 2022-06-16
Payer: MEDICARE

## 2022-06-16 PROCEDURE — 97530 THERAPEUTIC ACTIVITIES: CPT

## 2022-06-16 PROCEDURE — 97112 NEUROMUSCULAR REEDUCATION: CPT

## 2022-06-16 PROCEDURE — 97110 THERAPEUTIC EXERCISES: CPT

## 2022-06-16 NOTE — PROGRESS NOTES
[address]  [place] [address],[facility], [facility] - Phone: [phone]  Fax: [phone] OF [facility] PHYSICAL THERAPY          Patient Name: Loni Yancey : 1970   Treatment/Medical Diagnosis: Ataxic gait [R26.0]   Onset Date: 38 yrs    Referral Source: Radha Negro* Start of Care [facility] [place]): 22   Prior Hospitalization: See Medical History Provider #: [id number]   Prior Level of Function: Unsteadiness in gait, poor coordination   Comorbidities: TBI at age 12, mm spasms, LBP   Medications: Verified on Patient Summary List   Visits from [facility]: 5 Missed Visits: 0     GOALS:  · Short Term Goals: To be accomplished in  1  weeks  STG1 Pt to improve 6\" step ups to one hand use to improve ability to get on the bus. MET 22  · Long Term Goals: To be accomplished in  3  weeks  LTG1 Pt to reports less muscle spasm frequency in the day (per  and patient agreement) to improve QOL MET 22  LTG2 Pt to indicate a 4+ on the GROC, \"moderately better\" MET 5+ \"a good deal better\"  LTG3 Pt to deny falls over the past 4 weeks to indicate reduction in falls risk MET denies falls 22  LTG4 Pt to report on/off the bus without complication at a 54% consistency rate to indicate improved safety for transit MET 22    Key Functional Changes/Progress: PN today and all initial goals met at this time with therapy. Decision making, multi-step tasks, dual tasks and direction following, are a challenge to patient at this time. While initial visit goals are met, due to safety concerns with pt taking short steps and demonstrating variable LE stepping strategy, we will continue POC for 2 more visits then DC to community.      Problem List: impaired gait/ balance, decrease ADL/ functional abilitiies and other balance/safety   Treatment Plan may include any combination of the following: Therapeutic exercise, Therapeutic activities, Neuromuscular re-education, Physical agent/modality, Gait/balance training, Manual therapy, Patient education, Self Care training and Functional mobility training     Patient Goal(s) has been updated and includes:  NEW Goals:  Pt to demonstrate intact stepping strategy fwd, lateral, retro for safety in recovery from LOB in community. - 2 more visits.  Goals for this certification period include and are to be achieved in   1  weeks:    Frequency / Duration:   Patient to be seen   2   times per week for   1   more weeks. Assessments/Recommendations:   Continue to progress this patient, as able, towards new/stated goals    If you have any questions/comments please contact us directly at [phone]. Thank you for allowing us to assist in the care of your patient. Therapist Signature: Anup Draper PT Date: 0/45/7666   Certification Period:  Reporting Period: 5/19/22 - 8/17/22 5/19/22 - 6/16/22 Time: 5:56 PM   NOTE TO PHYSICIAN:  PLEASE COMPLETE THE ORDERS BELOW AND FAX TO   [facility] Physical Therapy: [phone]  If you are unable to process this request in 24 hours please contact our office: [phone]    ___ I have read the above report and request that my patient continue as recommended.   ___ I have read the above report and request that my patient continue therapy with the following changes/special instructions: ________________________________________________   ___ I have read the above report and request that my patient be discharged from therapy.      Physician Signature:        Date:       Time:                                       Marty Hoskins

## 2022-06-16 NOTE — PROGRESS NOTES
PHYSICAL THERAPY - DAILY TREATMENT NOTE    Patient Name: Hope Lim        Date: 2022  : 1970   yes Patient  Verified  Visit #:   4   of   7  Insurance: Payor: Jorge Alberto Smalls [place] / Plan: 72 [employer] MCR / Product Type: Managed Care Medicare /      In time: 5:10 Out time: 5:55   Total Treatment Time: 45     Medicare/[facility] Time Tracking (below)   Total Timed Codes (min):  45 1:1 Treatment Time:  45     TREATMENT AREA =  Ataxic gait [R26.0]    SUBJECTIVE  Pain Level (on 0 to 10 scale):  0  / 10   Medication Changes/New allergies or changes in medical history, any new surgeries or procedures?    no  If yes, update Summary List   Subjective Functional Status/Changes:  []  No changes reported     Pt's caregiver denies falls or difficulty in transportation. Says spasms aren't happening much any more. OBJECTIVE  15 min Therapeutic Activity: [x]  See flow sheet   Rationale:    increase strength and improve coordination to improve the patients ability to step and reach    22 min Neuromuscular Re-ed: [x]  See flow sheet   Rationale:    improve coordination, improve balance and increase proprioception to improve the patients ability to perform standing activiites. 8 min Therapeutic Exercise: [x]  See flow sheet   Rationale:    increase ROM and increase strength to improve the patients ability to reach, step for ADLs    Billed With/As:   [x] TE   [] TA   [] Neuro   [] Self Care Patient Education: [x] Review HEP    [] Progressed/Changed HEP based on:   [] positioning   [] body mechanics   [] transfers   [] heat/ice application    [] other:      Other Objective/Functional Measures:    Gait drills: With cognitive tasks. Water-cup carry, retro gait, EC gait, EC, Crossover step, ladder drills, Gait with HT/VCs. Multi-task. Tball bounce pass, tball kicking, tball bounce and walk, tball decision passing. Patricio Camarena       Post Treatment Pain Level (on 0 to 10) scale:   0  / 10 ASSESSMENT  Assessment/Changes in Function:     PN today and all initial goals met at this time with therapy. Decision making, multi-step tasks, dual tasks and direction following, are a challenge to patient at this time. While initial visit goals are met, due to safety concerns with pt taking short steps and demonstrating variable LE stepping strategy, we will continue POC for 2 more visits then DC to community. []  See Progress Note/Recertification   Patient will continue to benefit from skilled PT services to modify and progress therapeutic interventions, address functional mobility deficits, address ROM deficits, address strength deficits, analyze and address soft tissue restrictions, analyze and cue movement patterns, analyze and modify body mechanics/ergonomics and assess and modify postural abnormalities to attain remaining goals. Progress toward goals / Updated goals: · Short Term Goals: To be accomplished in  1  weeks  STG1 Pt to improve 6\" step ups to one hand use to improve ability to get on the bus. MET 6/14/22  · Long Term Goals: To be accomplished in  3  weeks  LTG1 Pt to reports less muscle spasm frequency in the day (per  and patient agreement) to improve QOL MET 6/16/22  LTG2 Pt to indicate a 4+ on the GROC, \"moderately better\" MET 5+ \"a good deal better\"  LTG3 Pt to deny falls over the past 4 weeks to indicate reduction in falls risk MET denies falls 6/16/22  LTG4 Pt to report on/off the bus without complication at a 42% consistency rate to indicate improved safety for transit MET 6/14/22    NEW Goals:  Pt to demonstrate intact stepping strategy fwd, lateral, retro for safety in recovery from LOB in community. - 2 more visits.      PLAN  [x]  Upgrade activities as tolerated yes Continue plan of care   []  Discharge due to :    []  Other:      Therapist: Taina Simons PT    Date: 6/16/2022 Time: 4:49 PM     Future Appointments   Date Time Provider Jem Ruiz 6/16/2022  5:15 PM Jesus Alberto Ventura, PT [facility]   6/21/2022  5:15 PM Jessy Rodriguez, PT [facility]   6/23/2022  6:00 PM Jessy Rodriguez, PT [facility]   11/2/2022  9:00 AM LAB_[facility] BSMA BS AMB   11/4/2022 11:10 AM [facility] NURSE [facility]   11/9/2022 10:45 AM Dorota Soliz MD BS[facility] BS AMB   11/11/2022  3:15 PM Victor Manuel Prieto MD [facility] [facility]

## 2022-06-21 ENCOUNTER — TELEPHONE (OUTPATIENT)
Dept: FAMILY MEDICINE CLINIC | Age: 52
End: 2022-06-21

## 2022-06-21 NOTE — TELEPHONE ENCOUNTER
, Jenn Ch, called to ask if they could get some bacitracin for right shoulder abrasion for pt to apply; please specify amount to apply, ex. Dime size, quarter size, silver dollar size; Rx needs to be sent to [address] [facility].

## 2022-06-23 ENCOUNTER — HOSPITAL ENCOUNTER (OUTPATIENT)
Dept: PHYSICAL THERAPY | Age: 52
Discharge: HOME | End: 2022-06-23
Payer: MEDICARE

## 2022-06-23 PROCEDURE — 97112 NEUROMUSCULAR REEDUCATION: CPT

## 2022-06-23 PROCEDURE — 97530 THERAPEUTIC ACTIVITIES: CPT

## 2022-06-23 PROCEDURE — 97110 THERAPEUTIC EXERCISES: CPT

## 2022-06-23 NOTE — TELEPHONE ENCOUNTER
Spoke w/pt's  and they are scheduled for:   Future Appointments   Date Time Provider Jem Sara   6/23/2022  6:00 PM [facility]   6/24/2022  1:45 PM Hoa Dc MD [facility] BS AMB   11/2/2022  9:00 AM LAB_[facility] BS AMB   11/4/2022 11:10 AM [facility]   11/9/2022 10:45 AM Hoa Dc MD [facility] BS AMB   11/11/2022  3:15 PM [facility]

## 2022-06-23 NOTE — TELEPHONE ENCOUNTER
How big is the abrasion ,how long he had it how did it  Happen?  Is it infected  ,can I see it by [facility] or they need to schedule appointment

## 2022-06-23 NOTE — PROGRESS NOTES
[address] Zambrano, [facility] [address],[facility], [address] [facility] - Phone: [phone]  Fax: [phone]  DISCHARGE SUMMARY FOR PHYSICAL THERAPY          Patient Name: Deangelo Jimenez : 1970   Treatment/Medical Diagnosis: Ataxic gait [R26.0]   Onset Date: 38 yrs     Referral Source: Almasministerio Cabral* Start of Care [facility]): 22   Prior Hospitalization: See Medical History Provider #: [id number]   Prior Level of Function: Unsteadiness in gait, poor coordination   Comorbidities: TBI at age 12, mm spasms, LBP   Medications: Verified on Patient Summary List   Visits from [facility]: 6 Missed Visits: 1      GOALS:  NEW Goals:  Pt to demonstrate intact stepping strategy fwd, lateral, retro for safety in recovery from LOB in community. - 2 more visits. GOAL MET    Key Functional Changes/Progress: Pt met all goals in last PN from 22 and met final goal today. Based on patient progress and goal status patient is appropriate to DC from this episode of care at this time. DC planning and continuation of HEP was discussed, pt instructed, all questions answered. Assessments/Recommendations: Discontinue therapy. Progressing towards or have reached established goals. If you have any questions/comments please contact us directly at [phone]. Thank you for allowing us to assist in the care of your patient. Therapist Signature: Bryce Lea PT Date: 22   Reporting Period: 22 - 22 Time: 7:10 PM   NOTE TO PHYSICIAN:  Your patient's insurance requires this discharge note be signed and returned. PLEASE COMPLETE THE ORDERS BELOW AND RETURN TO:  [facility] PHYSICAL THERAPY     ___ I have read the above report and request that my patient be discharged from therapy.       Physician Signature:                                                               Date:                                        Time: Ca Sanders*

## 2022-06-23 NOTE — PROGRESS NOTES
PHYSICAL THERAPY - DAILY TREATMENT NOTE    Patient Name: Austin Ruano        Date: 2022  : 1970   yes Patient  Verified  Visit #:   6   of   7  Insurance: Payor: Jorge Alberto Smalls Beccaria / Plan: [employer] MCR / Product Type: Managed Care Medicare /      In time: 6:00 Out time: 6:45   Total Treatment Time: 45     Medicare/BCBS Time Tracking (below)   Total Timed Codes (min):  45 1:1 Treatment Time:  45     TREATMENT AREA =  Ataxic gait [R26.0]    SUBJECTIVE  Pain Level (on 0 to 10 scale):  0  / 10   Medication Changes/New allergies or changes in medical history, any new surgeries or procedures?    no  If yes, update Summary List   Subjective Functional Status/Changes:  []  No changes reported     Pt's caregiver denies falls or difficulty in transportation. Says spasms aren't happening much any more. OBJECTIVE  15 min Therapeutic Activity: [x]  See flow sheet   Rationale:    increase strength and improve coordination to improve the patients ability to step and reach    22 min Neuromuscular Re-ed: [x]  See flow sheet   Rationale:    improve coordination, improve balance and increase proprioception to improve the patients ability to perform standing activiites. 8 min Therapeutic Exercise: [x]  See flow sheet   Rationale:    increase ROM and increase strength to improve the patients ability to reach, step for ADLs    Billed With/As:   [x] TE   [] TA   [] Neuro   [] Self Care Patient Education: [x] Review HEP    [] Progressed/Changed HEP based on:   [] positioning   [] body mechanics   [] transfers   [] heat/ice application    [] other:      Other Objective/Functional Measures:    Gait drills: With cognitive tasks. Cone/ball carry, DB carry, curls and carry, ball bounce, ball pass, object tapping with walking, ladder drills, Gait with HT/VCs. Multi-task.        Post Treatment Pain Level (on 0 to 10) scale:   0  / 10     ASSESSMENT  Assessment/Changes in Function:     DC today due to pt goals met at this time. []  See Progress Note/Recertification   Patient will continue to benefit from skilled PT services to modify and progress therapeutic interventions, address functional mobility deficits, address ROM deficits, address strength deficits, analyze and address soft tissue restrictions, analyze and cue movement patterns, analyze and modify body mechanics/ergonomics and assess and modify postural abnormalities to attain remaining goals. Progress toward goals / Updated goals: · Short Term Goals: To be accomplished in  1  weeks  STG1 Pt to improve 6\" step ups to one hand use to improve ability to get on the bus. MET 6/14/22  · Long Term Goals: To be accomplished in  3  weeks  LTG1 Pt to reports less muscle spasm frequency in the day (per  and patient agreement) to improve QOL MET 6/16/22  LTG2 Pt to indicate a 4+ on the GROC, \"moderately better\" MET 5+ \"a good deal better\"  LTG3 Pt to deny falls over the past 4 weeks to indicate reduction in falls risk MET denies falls 6/16/22  LTG4 Pt to report on/off the bus without complication at a 82% consistency rate to indicate improved safety for transit MET 6/14/22    NEW Goals:  Pt to demonstrate intact stepping strategy fwd, lateral, retro for safety in recovery from LOB in community. - 2 more visits. GOAL MET     PLAN  []  Upgrade activities as tolerated yes Continue plan of care   [x]  Discharge due to : Goals met   []  Other:      Therapist: Xavier Collier, PT    Date: 6/23/2022 Time: 4:49 PM     Future Appointments   Date Time Provider Jem Ruiz   6/23/2022  6:00 PM Gissell Nogueira PT [facility]   6/24/2022  1:45 PM MD REYES Walker BS AMB   11/2/2022  9:00 AM LAB_REYES CHAMBERS BS AMB   11/4/2022 11:10 AM [facility] NURSE [place] [id number] [facility] [address]   11/9/2022 10:45 AM MD REYES Walker BS AMB   11/11/2022  3:15 PM Norris Romberg, MD [place]

## 2022-06-24 ENCOUNTER — VIRTUAL VISIT (OUTPATIENT)
Dept: FAMILY MEDICINE CLINIC | Age: 52
End: 2022-06-24
Payer: MEDICARE

## 2022-06-24 DIAGNOSIS — T14.8XXA SKIN ABRASION: Primary | ICD-10-CM

## 2022-06-24 PROCEDURE — G8756 NO BP MEASURE DOC: HCPCS | Performed by: LEGAL MEDICINE

## 2022-06-24 PROCEDURE — G8427 DOCREV CUR MEDS BY ELIG CLIN: HCPCS | Performed by: LEGAL MEDICINE

## 2022-06-24 PROCEDURE — 3017F COLORECTAL CA SCREEN DOC REV: CPT | Performed by: LEGAL MEDICINE

## 2022-06-24 PROCEDURE — 99213 OFFICE O/P EST LOW 20 MIN: CPT | Performed by: LEGAL MEDICINE

## 2022-06-24 PROCEDURE — G9717 DOC PT DX DEP/BP F/U NT REQ: HCPCS | Performed by: LEGAL MEDICINE

## 2022-06-24 RX ORDER — BACITRACIN ZINC 500 UNIT/G
OINTMENT (GRAM) TOPICAL 2 TIMES DAILY
Qty: 15 G | Refills: 0 | Status: SHIPPED | OUTPATIENT
Start: 2022-06-24 | End: 2022-10-04

## 2022-06-24 NOTE — PROGRESS NOTES
Madison Mi is a 45 y.o. male who was seen by synchronous (real-time) audio-video technology on 6/24/2022 for Shoulder Problem (abrasion on back of left shoulder)    He is accompanied today by Jairon Paredes  His assistant/caregiver  He had abrasionin the right shoulder ( they not sure how it happened )  Reported on Tuesday 3 days ago   It is not painful, patient has no complaint, it is no pain no open wound    Assessment & Plan:   Diagnoses and all orders for this visit:    1. Skin abrasion  -     bacitracin zinc (BACITRACIN) ointment; Apply  to affected area two (2) times a day. For 7 to 10 days    If there is any worsening increased erythema or pus please contact our office and follow-up in the office          [phone]  Subjective:       Prior to Admission medications    Medication Sig Start Date End Date Taking? Authorizing Provider   bacitracin zinc (BACITRACIN) ointment Apply  to affected area two (2) times a day. 6/24/22  Yes Chandler Renee MD   trospium (SANCTURA) 20 mg tablet Take 1 Tablet by mouth two (2) times a day. Indications: overactive bladder 5/24/22  Yes Eddie Osullivan MD   SF 5000 Plus 1.1 % crea  5/5/22  Yes Provider, Historical   multivitamin (Tab-A-Saad) tablet TAKE 1 TABLET ONCE DAILY FOR NUTRITIONAL SUPPLEMENT. NOTIFY MD IF GI DISTRESS OCCURS. 11/18/21  Yes Chandler Renee MD   fluticasone propionate (FLONASE) 50 mcg/actuation nasal spray 2 Sprays by Both Nostrils route daily. 11/18/21  Yes Chandler Renee MD   acetaminophen (TYLENOL) 500 mg tablet Take 1 Tablet by mouth every six (6) hours as needed for Pain. And for fever or headaches  Indications: pain 11/18/21  Yes Chandler Renee MD   guaiFENesin ER (MUCINEX) 600 mg ER tablet Take 1 Tablet by mouth two (2) times a day. As needed for cough 11/18/21  Yes Chandler Renee MD   sodium chloride (OCEAN) 0.65 % nasal squeeze bottle 0.05 mL by Both Nostrils route as needed for Congestion.  11/18/21  Yes Chandler Renee MD ARIPiprazole (ABILIFY) 5 mg tablet  9/7/21  Yes Provider, Historical   fludrocortisone (FLORINEF) 0.1 mg tablet Take 0.1 mg by mouth daily. Yes Provider, Historical   temazepam (RESTORIL) 15 mg capsule  11/10/20  Yes Provider, Historical   FLUoxetine (PROZAC) 20 mg capsule TK 1 C PO QD 1/23/18  Yes Provider, Historical   trimethoprim-sulfamethoxazole (BACTRIM DS, SEPTRA DS) 160-800 mg per tablet Take 1 Tablet by mouth two (2) times a day for 7 days. Patient not taking: Reported on 6/24/2022 5/12/22 5/19/22  Chino Francisco MD     Patient Active Problem List   Diagnosis Code    Anoxic brain damage ([facility] G93.1    H/O traumatic brain injury Z87.820    Abnormal TSH R79.89    Urine frequency R35.0    Ataxia R27.0    Cognitive disorder F09    CP (cerebral palsy) ([address] G80.9    Insomnia, unspecified G47.00    Major depressive disorder F32.9    Anoxic brain injury ([address] G93.1    Vertigo R42    Essential hypertension I10    Psychotic disorder ([address] F29    Sleep disorder G47.9    Syncope and collapse R55     Patient Active Problem List    Diagnosis Date Noted    Abnormal TSH 09/10/2019    Urine frequency 09/10/2019    Anoxic brain damage ([address] 06/13/2018    H/O traumatic brain injury 06/13/2018    Ataxia 03/15/2018    Anoxic brain injury ([address] 03/15/2018    Vertigo 03/15/2018    Cognitive disorder 04/13/2016    Insomnia, unspecified 04/13/2016    Major depressive disorder 04/13/2016    CP (cerebral palsy) ([address] 12/01/2011    Essential hypertension 01/01/1901    Psychotic disorder ([address] 01/01/1901    Sleep disorder 01/01/1901    Syncope and collapse 01/01/1901     Current Outpatient Medications   Medication Sig Dispense Refill    bacitracin zinc (BACITRACIN) ointment Apply  to affected area two (2) times a day. 15 g 0    trospium (SANCTURA) 20 mg tablet Take 1 Tablet by mouth two (2) times a day.  Indications: overactive bladder 60 Tablet 2    SF 5000 Plus 1.1 % crea       multivitamin (Tab-A-Saad) tablet TAKE 1 TABLET ONCE DAILY FOR NUTRITIONAL SUPPLEMENT. NOTIFY MD IF GI DISTRESS OCCURS. 90 Tablet 3    fluticasone propionate (FLONASE) 50 mcg/actuation nasal spray 2 Sprays by Both Nostrils route daily. 1 Each 5    acetaminophen (TYLENOL) 500 mg tablet Take 1 Tablet by mouth every six (6) hours as needed for Pain. And for fever or headaches  Indications: pain 30 Tablet 2    guaiFENesin ER (MUCINEX) 600 mg ER tablet Take 1 Tablet by mouth two (2) times a day. As needed for cough 20 Tablet 5    sodium chloride (OCEAN) 0.65 % nasal squeeze bottle 0.05 mL by Both Nostrils route as needed for Congestion. 104 mL 11    ARIPiprazole (ABILIFY) 5 mg tablet       fludrocortisone (FLORINEF) 0.1 mg tablet Take 0.1 mg by mouth daily.  temazepam (RESTORIL) 15 mg capsule       FLUoxetine (PROZAC) 20 mg capsule TK 1 C PO QD  5    trimethoprim-sulfamethoxazole (BACTRIM DS, SEPTRA DS) 160-800 mg per tablet Take 1 Tablet by mouth two (2) times a day for 7 days. (Patient not taking: Reported on 6/24/2022) 14 Tablet 0     No Known Allergies  Past Medical History:   Diagnosis Date    Brain anoxic injury ([facility]     Depression 02/01/2005    recurrent episode, Major Depression    Psychotic disorder ([facility] 12/07/2007    Intellectual disability     Vertigo 03/17/2018     Past Surgical History:   Procedure Laterality Date    HX TRACHEOSTOMY N/A 1985    HX UROLOGICAL  05/12/2022    CYSTOSCOPY; TRANSPERINEAL PROSTATE BIOPSY; Dr. Lea Goetz     Family History   Family history unknown: Yes     Social History     Tobacco Use    Smoking status: Never Smoker    Smokeless tobacco: Never Used   Substance Use Topics    Alcohol use: Yes       Review of Systems   Constitutional: Negative for chills, diaphoresis, fever and malaise/fatigue. HENT: Negative for congestion, ear discharge, ear pain, hearing loss and nosebleeds. Eyes: Negative for blurred vision, double vision and discharge.    Respiratory: Negative for cough. Cardiovascular: Negative for chest pain, palpitations, claudication and leg swelling. Gastrointestinal: Negative for abdominal pain, constipation, diarrhea, nausea and vomiting. Genitourinary: Negative for dysuria, frequency and urgency. Musculoskeletal: Negative for myalgias. Skin: Negative for itching and rash. As per HPI   Neurological: Negative for tingling, sensory change, speech change, focal weakness, weakness and headaches. Objective:     Patient-Reported Vitals 9/10/2020   Patient-Reported Weight 175 lb   Patient-Reported Pulse 88   Patient-Reported Temperature 97.4   Patient-Reported Systolic  565   Patient-Reported Diastolic 78      General: alert, cooperative, no distress   Mental  status: normal mood, behavior, speech, dress, motor activity, and thought processes, able to follow commands   HENT: NCAT   Neck: no visualized mass   Resp: no respiratory distress   Neuro: no gross deficits   Skin:  There is an abrasion in the upper back in the area of the scapula on the right side it is superficial no open skin mild erythema no swelling was observed is not tender to touch   Psychiatric: normal affect, consistent with stated mood, no evidence of hallucinations     Additional exam findings: We discussed the expected course, resolution and complications of the diagnosis(es) in detail. Medication risks, benefits, costs, interactions, and alternatives were discussed as indicated. I advised him to contact the office if his condition worsens, changes or fails to improve as anticipated. He expressed understanding with the diagnosis(es) and plan. Regino Resendiz, was evaluated through a synchronous (real-time) audio-video encounter. The patient (or guardian if applicable) is aware that this is a billable service, which includes applicable co-pays. This Virtual Visit was conducted with patient's (and/or legal guardian's) consent.  The visit was conducted pursuant to the emergency declaration under the [address] [facility], [address] [place] [address] authority and the [email or web address] and [employer] General Act. Patient identification was verified, and a caregiver was present when appropriate. The patient was located at: Home: [facility] [address]  The provider was located at:  Facility (Appt Department): [address] [place] [address]        Evan Cowan MD

## 2022-06-24 NOTE — PROGRESS NOTES
Samara Browning presents today for   Chief Complaint   Patient presents with    Shoulder Problem     abrasion on back of left shoulder       Virtual/telephone visit  There were no vitals taken for this visit. Depression Screening:  3 most recent PHQ Screens 6/24/2022   PHQ Not Done -   Little interest or pleasure in doing things Not at all   Feeling down, depressed, irritable, or hopeless Not at all   Total Score PHQ 2 0       Learning Assessment:  Learning Assessment 9/10/2019   PRIMARY LEARNER Patient   HIGHEST LEVEL OF EDUCATION - PRIMARY LEARNER  -   BARRIERS PRIMARY LEARNER -   CO-LEARNER CAREGIVER -   PRIMARY LANGUAGE ENGLISH   LEARNER PREFERENCE PRIMARY READING     LISTENING     VIDEOS   ANSWERED BY patient   RELATIONSHIP SELF       Travel Screening:    Travel Screening     Question   Response    In the last 10 days, have you been in contact with someone who was confirmed or suspected to have Coronavirus/COVID-19? No / Unsure    Have you had a COVID-19 viral test in the last 10 days? No    Do you have any of the following new or worsening symptoms? None of these    Have you traveled internationally or domestically in the last month? No      Travel History   Travel since 05/24/22    No documented travel since 05/24/22         Health Maintenance reviewed and discussed and ordered per Provider. Health Maintenance Due   Topic Date Due    COVID-19 Vaccine (1) Never done    Colorectal Cancer Screening Combo  Never done   . Coordination of Care:  1. Have you been to the ER, urgent care clinic since your last visit? Hospitalized since your last visit? No    2. Have you seen or consulted any other health care providers outside of the [address] [place] [address] since your last visit? Include any pap smears or colon screening. Yes urologist.         [email or web address]        [phone] can call when link is sent.

## 2022-08-03 ENCOUNTER — TELEPHONE (OUTPATIENT)
Dept: FAMILY MEDICINE CLINIC | Age: 52
End: 2022-08-03

## 2022-08-03 NOTE — TELEPHONE ENCOUNTER
Lazaro Falk, Pt's new Care Coordinator, called in regards to pt. States that pt has been taking a multivitamin tablet and was instructed to contact the Dr if GI distress occurs. States that pt has be having nausea and vomiting. Has had occurences in June and most recently Monday, Aug 3 2022. Please Advise.   Best contact number: [phone]

## 2022-08-03 NOTE — TELEPHONE ENCOUNTER
Spoke w/Amanda, pt's care coordinator regarding pt feeling nauseous and some vomiting; pt is scheduled:   Future Appointments   Date Time Provider Jem Burgessi   8/4/2022 10:00 AM Evgeny Cui MD BSMA BS [facility]   11/2/2022  9:00 AM LAB_BSMA [facility] BS AMB   11/4/2022 11:10 AM [facility] NURSE [facility] LESLYE TELMA   11/9/2022 10:45 AM Evgeny Cui MD BSMA BS [facility]   11/11/2022  3:15 PM Mady Mckoy MD [id number]

## 2022-08-04 ENCOUNTER — APPOINTMENT (OUTPATIENT)
Dept: FAMILY MEDICINE CLINIC | Age: 52
End: 2022-08-04

## 2022-08-04 ENCOUNTER — OFFICE VISIT (OUTPATIENT)
Dept: FAMILY MEDICINE CLINIC | Age: 52
End: 2022-08-04
Payer: MEDICARE

## 2022-08-04 ENCOUNTER — HOSPITAL ENCOUNTER (OUTPATIENT)
Dept: LAB | Age: 52
Discharge: HOME | End: 2022-08-04
Payer: MEDICARE

## 2022-08-04 VITALS
HEART RATE: 91 BPM | OXYGEN SATURATION: 93 % | DIASTOLIC BLOOD PRESSURE: 60 MMHG | TEMPERATURE: 97.8 F | RESPIRATION RATE: 15 BRPM | HEIGHT: 71 IN | BODY MASS INDEX: 24.44 KG/M2 | WEIGHT: 174.6 LBS | SYSTOLIC BLOOD PRESSURE: 100 MMHG

## 2022-08-04 DIAGNOSIS — R94.6 ABNORMAL RESULTS OF THYROID FUNCTION STUDIES: ICD-10-CM

## 2022-08-04 DIAGNOSIS — Z00.00 MEDICARE ANNUAL WELLNESS VISIT, SUBSEQUENT: ICD-10-CM

## 2022-08-04 DIAGNOSIS — G80.9 CEREBRAL PALSY, UNSPECIFIED TYPE (HCC): ICD-10-CM

## 2022-08-04 DIAGNOSIS — I10 ESSENTIAL HYPERTENSION: ICD-10-CM

## 2022-08-04 DIAGNOSIS — R73.09 ABNORMAL BLOOD SUGAR: ICD-10-CM

## 2022-08-04 DIAGNOSIS — Z13.220 LIPID SCREENING: ICD-10-CM

## 2022-08-04 DIAGNOSIS — Z87.820 H/O TRAUMATIC BRAIN INJURY: ICD-10-CM

## 2022-08-04 DIAGNOSIS — C61 PROSTATE CANCER (HCC): ICD-10-CM

## 2022-08-04 DIAGNOSIS — R63.4 WEIGHT LOSS: ICD-10-CM

## 2022-08-04 DIAGNOSIS — R11.2 NAUSEA AND VOMITING, UNSPECIFIED VOMITING TYPE: Primary | ICD-10-CM

## 2022-08-04 LAB
ALBUMIN SERPL-MCNC: 3.8 G/DL (ref 3.4–5)
ALBUMIN/GLOB SERPL: 1.2 {RATIO} (ref 0.8–1.7)
ALP SERPL-CCNC: 67 U/L (ref 45–117)
ALT SERPL-CCNC: 22 U/L (ref 16–61)
ANION GAP SERPL CALC-SCNC: 3 MMOL/L (ref 3–18)
AST SERPL-CCNC: 17 U/L (ref 10–38)
BASOPHILS # BLD: 0 K/UL (ref 0–0.1)
BASOPHILS NFR BLD: 0 % (ref 0–2)
BILIRUB SERPL-MCNC: 0.3 MG/DL (ref 0.2–1)
BUN SERPL-MCNC: 11 MG/DL (ref 7–18)
BUN/CREAT SERPL: 12 (ref 12–20)
CALCIUM SERPL-MCNC: 8.6 MG/DL (ref 8.5–10.1)
CHLORIDE SERPL-SCNC: 108 MMOL/L (ref 100–111)
CHOLEST SERPL-MCNC: 130 MG/DL
CO2 SERPL-SCNC: 31 MMOL/L (ref 21–32)
CREAT SERPL-MCNC: 0.93 MG/DL (ref 0.6–1.3)
DIFFERENTIAL METHOD BLD: ABNORMAL
EOSINOPHIL # BLD: 0.1 K/UL (ref 0–0.4)
EOSINOPHIL NFR BLD: 1 % (ref 0–5)
ERYTHROCYTE [DISTWIDTH] IN BLOOD BY AUTOMATED COUNT: 12.7 % (ref 11.6–14.5)
GLOBULIN SER CALC-MCNC: 3.3 G/DL (ref 2–4)
GLUCOSE SERPL-MCNC: 98 MG/DL (ref 74–99)
HBA1C MFR BLD: 5.4 % (ref 4.2–5.6)
HCT VFR BLD AUTO: 42 % (ref 36–48)
HDLC SERPL-MCNC: 33 MG/DL (ref 40–60)
HDLC SERPL: 3.9 {RATIO} (ref 0–5)
HGB BLD-MCNC: 14.1 G/DL (ref 13–16)
IMM GRANULOCYTES # BLD AUTO: 0 K/UL (ref 0–0.04)
IMM GRANULOCYTES NFR BLD AUTO: 0 % (ref 0–0.5)
LDLC SERPL CALC-MCNC: 61 MG/DL (ref 0–100)
LIPID PROFILE,FLP: ABNORMAL
LYMPHOCYTES # BLD: 1.8 K/UL (ref 0.9–3.6)
LYMPHOCYTES NFR BLD: 34 % (ref 21–52)
MCH RBC QN AUTO: 31.1 PG (ref 24–34)
MCHC RBC AUTO-ENTMCNC: 33.6 G/DL (ref 31–37)
MCV RBC AUTO: 92.5 FL (ref 78–100)
MONOCYTES # BLD: 0.5 K/UL (ref 0.05–1.2)
MONOCYTES NFR BLD: 9 % (ref 3–10)
NEUTS SEG # BLD: 2.9 K/UL (ref 1.8–8)
NEUTS SEG NFR BLD: 55 % (ref 40–73)
NRBC # BLD: 0 K/UL (ref 0–0.01)
NRBC BLD-RTO: 0 PER 100 WBC
PLATELET # BLD AUTO: 267 K/UL (ref 135–420)
PMV BLD AUTO: 9.1 FL (ref 9.2–11.8)
POTASSIUM SERPL-SCNC: 4.5 MMOL/L (ref 3.5–5.5)
PROT SERPL-MCNC: 7.1 G/DL (ref 6.4–8.2)
RBC # BLD AUTO: 4.54 M/UL (ref 4.35–5.65)
SODIUM SERPL-SCNC: 142 MMOL/L (ref 136–145)
T4 FREE SERPL-MCNC: 0.8 NG/DL (ref 0.7–1.5)
TRIGL SERPL-MCNC: 180 MG/DL (ref ?–150)
TSH SERPL DL<=0.05 MIU/L-ACNC: 2.41 UIU/ML (ref 0.36–3.74)
VLDLC SERPL CALC-MCNC: 36 MG/DL
WBC # BLD AUTO: 5.3 K/UL (ref 4.6–13.2)

## 2022-08-04 PROCEDURE — 80061 LIPID PANEL: CPT

## 2022-08-04 PROCEDURE — G8427 DOCREV CUR MEDS BY ELIG CLIN: HCPCS | Performed by: LEGAL MEDICINE

## 2022-08-04 PROCEDURE — G9717 DOC PT DX DEP/BP F/U NT REQ: HCPCS | Performed by: LEGAL MEDICINE

## 2022-08-04 PROCEDURE — 84439 ASSAY OF FREE THYROXINE: CPT

## 2022-08-04 PROCEDURE — G8754 DIAS BP LESS 90: HCPCS | Performed by: LEGAL MEDICINE

## 2022-08-04 PROCEDURE — 99214 OFFICE O/P EST MOD 30 MIN: CPT | Performed by: LEGAL MEDICINE

## 2022-08-04 PROCEDURE — 85025 COMPLETE CBC W/AUTO DIFF WBC: CPT

## 2022-08-04 PROCEDURE — 36415 COLL VENOUS BLD VENIPUNCTURE: CPT

## 2022-08-04 PROCEDURE — 3017F COLORECTAL CA SCREEN DOC REV: CPT | Performed by: LEGAL MEDICINE

## 2022-08-04 PROCEDURE — 80053 COMPREHEN METABOLIC PANEL: CPT

## 2022-08-04 PROCEDURE — 83036 HEMOGLOBIN GLYCOSYLATED A1C: CPT

## 2022-08-04 PROCEDURE — G8420 CALC BMI NORM PARAMETERS: HCPCS | Performed by: LEGAL MEDICINE

## 2022-08-04 PROCEDURE — G8752 SYS BP LESS 140: HCPCS | Performed by: LEGAL MEDICINE

## 2022-08-04 NOTE — PROGRESS NOTES
Miguel Powell is a 47 y.o. male (: 1970) presenting to address:    Chief Complaint   Patient presents with    Nausea     Nausea w/vomiting       Vitals:    22 1003   BP: 100/60   Pulse: 91   Resp: 15   Temp: 97.8 °F (36.6 °C)   TempSrc: Temporal   SpO2: 93%   Weight: 174 lb 9.6 oz (79.2 kg)   Height: 5' 11\" (1.803 m)   PainSc:   0 - No pain       Hearing/Vision:   No results found. Learning Assessment:     Learning Assessment 9/10/2019   PRIMARY LEARNER Patient   HIGHEST LEVEL OF EDUCATION - PRIMARY LEARNER  -   BARRIERS PRIMARY LEARNER -   CO-LEARNER CAREGIVER -   PRIMARY LANGUAGE ENGLISH   LEARNER PREFERENCE PRIMARY READING     LISTENING     VIDEOS   ANSWERED BY patient   RELATIONSHIP SELF     Depression Screening:     3 most recent PHQ Screens 2022   PHQ Not Done -   Little interest or pleasure in doing things Not at all   Feeling down, depressed, irritable, or hopeless Not at all   Total Score PHQ 2 0     Fall Risk Assessment:     Fall Risk Assessment, last 12 mths 9/10/2019   Able to walk? No   Fall in past 12 months? -   Number of falls in past 12 months -   Fall with injury? -     Abuse Screening:     Abuse Screening Questionnaire 2021   Do you ever feel afraid of your partner? N   Are you in a relationship with someone who physically or mentally threatens you? N   Is it safe for you to go home?  Y     ADL Assessment:     ADL Assessment 2021   Feeding yourself No Help Needed   Getting from bed to chair No Help Needed   Getting dressed No Help Needed   Bathing or showering No Help Needed   Walk across the room (includes cane/walker) No Help Needed   Using the telphone Help Needed   Taking your medications Help Needed   Preparing meals Help Needed   Managing money (expenses/bills) Help Needed   Moderately strenuous housework (laundry) Help Needed   Shopping for personal items (toiletries/medicines) Help Needed   Shopping for groceries Help Needed   Driving Help Needed Climbing a flight of stairs Help Needed   Getting to places beyond walking distances Help Needed        Coordination of Care Questionaire:   1. \"Have you been to the ER, urgent care clinic since your last visit? Hospitalized since your last visit? \" No    2. \"Have you seen or consulted any other health care providers outside of the [address] since your last visit? \"  [facility], Dr. Petty Bach; neurology every 6 months      3. For patients aged 39-70: Has the patient had a colonoscopy / FIT/ Cologuard? Yes - no Care Gap present    If the patient is female:    4. For patients aged 41-77: Has the patient had a mammogram within the past 2 years? NA - based on age or sex  See top three    5. For patients aged 21-65: Has the patient had a pap smear? NA - based on age or sex    Advanced Directive:   1. Do you have an Advanced Directive? NO    2. Would you like information on Advanced Directives?  NO

## 2022-08-04 NOTE — PROGRESS NOTES
Formerly [facility]     Chief Complaint   Patient presents with    Nausea     Nausea w/vomiting     Vitals:    08/04/22 1003   BP: 100/60   Pulse: 91   Resp: 15   Temp: 97.8 °F (36.6 °C)   TempSrc: Temporal   SpO2: 93%   Weight: 174 lb 9.6 oz (79.2 kg)   Height: 5' 11\" (1.803 m)   PainSc:   0 - No pain         HPI:Warner Napoles is here with       for nausea and vomiting few times , actually it might be happening more  that that , he is by himself  at night and most of the day time he has staff  there , he has been recently started on [address] by urologist in May, caregiver thinking that might be side effects due to this medication    I have noted that the patient has lost 10 pounds since last visit about 2-1/2 months ago  Patient was seen by urologist he was diagnosed with prostate cancer that does not need treatment at this time he was scheduled to follow-up in 6 months  He has been referred by urologist to get the colonoscopy records will be requested              Past Medical History:   Diagnosis Date    Brain anoxic injury ([facility] [address]     Depression 02/01/2005    recurrent episode, Major Depression    Psychotic disorder ([facility] [address] 12/07/2007    Intellectual disability     Vertigo 03/17/2018     Past Surgical History:   Procedure Laterality Date    HX TRACHEOSTOMY N/A 1985    HX UROLOGICAL  05/12/2022    CYSTOSCOPY; TRANSPERINEAL PROSTATE BIOPSY; Dr. Eh Branham     Social History     Tobacco Use    Smoking status: Never    Smokeless tobacco: Never   Substance Use Topics    Alcohol use: Yes       Family History   Family history unknown: Yes       Review of Systems   Constitutional:  Positive for weight loss. Negative for chills, diaphoresis, fever and malaise/fatigue. HENT:  Negative for congestion, ear discharge, ear pain, hearing loss, nosebleeds, sinus pain and sore throat. Eyes:  Negative for blurred vision, double vision and discharge. Respiratory:  Negative for cough and hemoptysis.     Cardiovascular:  Negative for chest pain, palpitations, claudication and leg swelling. Gastrointestinal:  Positive for nausea and vomiting. Negative for abdominal pain, constipation and diarrhea. Genitourinary:  Negative for dysuria, frequency and urgency. Musculoskeletal:  Negative for myalgias. Skin:  Negative for itching and rash. Neurological:  Negative for dizziness, tingling, sensory change, speech change, focal weakness, weakness and headaches. Physical Exam  Constitutional:       General: He is not in acute distress. Appearance: Normal appearance. He is well-developed. He is not ill-appearing, toxic-appearing or diaphoretic. HENT:      Head: Normocephalic and atraumatic. Eyes:      General: No scleral icterus. Right eye: No discharge. Left eye: No discharge. Conjunctiva/sclera: Conjunctivae normal.      Pupils: Pupils are equal, round, and reactive to light. Neck:      Thyroid: No thyromegaly. Cardiovascular:      Rate and Rhythm: Normal rate and regular rhythm. Heart sounds: Normal heart sounds. Pulmonary:      Effort: Pulmonary effort is normal. No respiratory distress. Breath sounds: Normal breath sounds. No stridor. No wheezing, rhonchi or rales. Chest:      Chest wall: No tenderness. Abdominal:      General: Bowel sounds are normal. There is no distension. Palpations: Abdomen is soft. There is no mass. Tenderness: There is no abdominal tenderness. There is no right CVA tenderness, left CVA tenderness, guarding or rebound. Hernia: No hernia is present. Musculoskeletal:         General: No tenderness or deformity. Normal range of motion. Cervical back: Normal range of motion. No rigidity or tenderness. Right lower leg: No edema. Left lower leg: No edema. Lymphadenopathy:      Cervical: No cervical adenopathy. Skin:     General: Skin is warm and dry. Findings: No erythema or rash.    Neurological:      Mental Status: He is alert and oriented to person, place, and time. Cranial Nerves: No cranial nerve deficit. Motor: No weakness. Coordination: Coordination normal.      Gait: Gait normal.   Psychiatric:         Mood and Affect: Mood normal.         Behavior: Behavior normal.         Thought Content: Thought content normal.         Judgment: Judgment normal.        Assessment and plan     Plan of care has been discussed with the patient, he agrees to the plan and verbalized understanding. All his questions were answered  More than 50% of the time spent in this visit was counseling the patient about  illness and treatment options         1. Nausea and vomiting, unspecified vomiting type  Staff need to monitor the patient and see if the frequency of the vomiting  waitfor blood work results  2. Weight loss  To obtain colonoscopy report  Monitor weight daily in the morning  3. Cerebral palsy, unspecified type ([facility] [address]      4. H/O traumatic brain injury  \    5. Prostate cancer [facility])  Is following up with urologist no need for treatment at this time he follow-up every 6 months  Current Outpatient Medications   Medication Sig Dispense Refill    trospium (SANCTURA) 20 mg tablet Take 1 Tablet by mouth two (2) times a day. Indications: overactive bladder 60 Tablet 2    SF 5000 Plus 1.1 % crea       multivitamin (Tab-A-Saad) tablet TAKE 1 TABLET ONCE DAILY FOR NUTRITIONAL SUPPLEMENT. NOTIFY MD IF GI DISTRESS OCCURS. 90 Tablet 3    fluticasone propionate (FLONASE) 50 mcg/actuation nasal spray 2 Sprays by Both Nostrils route daily. 1 Each 5    acetaminophen (TYLENOL) 500 mg tablet Take 1 Tablet by mouth every six (6) hours as needed for Pain. And for fever or headaches  Indications: pain 30 Tablet 2    guaiFENesin ER (MUCINEX) 600 mg ER tablet Take 1 Tablet by mouth two (2) times a day. As needed for cough 20 Tablet 5    sodium chloride (OCEAN) 0.65 % nasal squeeze bottle 0.05 mL by Both Nostrils route as needed for Congestion.  104 mL 11 ARIPiprazole (ABILIFY) 5 mg tablet       fludrocortisone (FLORINEF) 0.1 mg tablet Take 0.1 mg by mouth daily. temazepam (RESTORIL) 15 mg capsule       FLUoxetine (PROZAC) 20 mg capsule TK 1 C PO QD  5    bacitracin zinc (BACITRACIN) ointment Apply  to affected area two (2) times a day. (Patient not taking: Reported on 8/4/2022) 15 g 0    trimethoprim-sulfamethoxazole (BACTRIM DS, SEPTRA DS) 160-800 mg per tablet Take 1 Tablet by mouth two (2) times a day for 7 days.  (Patient not taking: Reported on 6/24/2022) 14 Tablet 0       Patient Active Problem List    Diagnosis Date Noted    Abnormal TSH 09/10/2019    Urine frequency 09/10/2019    Anoxic brain damage ([facility] [address] 06/13/2018    H/O traumatic brain injury 06/13/2018    Ataxia 03/15/2018    Anoxic brain injury ([facility] [address] 03/15/2018    Vertigo 03/15/2018    Cognitive disorder 04/13/2016    Insomnia, unspecified 04/13/2016    Major depressive disorder 04/13/2016    CP (cerebral palsy) ([facility] [address] 12/01/2011    Essential hypertension 01/01/1901    Psychotic disorder ([facility] [address] 01/01/1901    Sleep disorder 01/01/1901    Syncope and collapse 01/01/1901     Results for orders placed or performed in visit on 05/24/22   AMB POC URINALYSIS DIP STICK AUTO W/O MICRO   Result Value Ref Range    Color (UA POC) Yellow     Clarity (UA POC) Clear     Glucose (UA POC) Negative Negative    Bilirubin (UA POC) Negative Negative    Ketones (UA POC) Negative Negative    Specific gravity (UA POC) 1.005 1.001 - 1.035    Blood (UA POC) 2+ Negative    pH (UA POC) 5.5 4.6 - 8.0    Protein (UA POC) Negative Negative    Urobilinogen (UA POC) 0.2 mg/dL 0.2 - 1    Nitrites (UA POC) Negative Negative    Leukocyte esterase (UA POC) Negative Negative   AMB POC PVR, SID,POST-VOID RES,US,NON-IMAGING   Result Value Ref Range     cc     Hospital Outpatient Visit on 08/04/2022   Component Date Value Ref Range Status    WBC 08/04/2022 5.3  4.6 - 13.2 K/uL Final    RBC 08/04/2022 4.54  4.35 - 5.65 M/uL Final HGB 08/04/2022 14.1  13.0 - 16.0 g/dL Final    HCT 08/04/2022 42.0  36.0 - 48.0 % Final    MCV 08/04/2022 92.5  78.0 - 100.0 FL Final    MCH 08/04/2022 31.1  24.0 - 34.0 PG Final    MCHC 08/04/2022 33.6  31.0 - 37.0 g/dL Final    RDW 08/04/2022 12.7  11.6 - 14.5 % Final    PLATELET 93/66/9542 596  135 - 420 K/uL Final    MPV 08/04/2022 9.1 (A) 9.2 - 11.8 FL Final    NRBC 08/04/2022 0.0  0  WBC Final    ABSOLUTE NRBC 08/04/2022 0.00  0.00 - 0.01 K/uL Final    NEUTROPHILS 08/04/2022 55  40 - 73 % Final    LYMPHOCYTES 08/04/2022 34  21 - 52 % Final    MONOCYTES 08/04/2022 9  3 - 10 % Final    EOSINOPHILS 08/04/2022 1  0 - 5 % Final    BASOPHILS 08/04/2022 0  0 - 2 % Final    IMMATURE GRANULOCYTES 08/04/2022 0  0.0 - 0.5 % Final    ABS. NEUTROPHILS 08/04/2022 2.9  1.8 - 8.0 K/UL Final    ABS. LYMPHOCYTES 08/04/2022 1.8  0.9 - 3.6 K/UL Final    ABS. MONOCYTES 08/04/2022 0.5  0.05 - 1.2 K/UL Final    ABS. EOSINOPHILS 08/04/2022 0.1  0.0 - 0.4 K/UL Final    ABS. BASOPHILS 08/04/2022 0.0  0.0 - 0.1 K/UL Final    ABS. IMM. GRANS. 08/04/2022 0.0  0.00 - 0.04 K/UL Final    DF 08/04/2022 AUTOMATED    Final    Sodium 08/04/2022 142  136 - 145 mmol/L Final    Potassium 08/04/2022 4.5  3.5 - 5.5 mmol/L Final    Chloride 08/04/2022 108  100 - 111 mmol/L Final    CO2 08/04/2022 31  21 - 32 mmol/L Final    Anion gap 08/04/2022 3  3.0 - 18 mmol/L Final    Glucose 08/04/2022 98  74 - 99 mg/dL Final    BUN 08/04/2022 11  7.0 - 18 MG/DL Final    Creatinine 08/04/2022 0.93  0.6 - 1.3 MG/DL Final    BUN/Creatinine ratio 08/04/2022 12  12 - 20   Final    GFR est AA 08/04/2022 >60  >60 ml/min/1.73m2 Final    GFR est non-AA 08/04/2022 >60  >60 ml/min/1.73m2 Final    Comment: (NOTE)  Estimated GFR is calculated using the Modification of Diet in Renal   Disease (MDRD) Study equation, reported for both  Americans   (GFRAA) and non- Americans (GFRNA), and normalized to 1.73m2   body surface area.  The physician must decide which value applies to   the patient. The MDRD study equation should only be used in   individuals age 25 or older. It has not been validated for the   following: pregnant women, patients with serious comorbid conditions,   or on certain medications, or persons with extremes of body size,   muscle mass, or nutritional status. Calcium 08/04/2022 8.6  8.5 - 10.1 MG/DL Final    Bilirubin, total 08/04/2022 0.3  0.2 - 1.0 MG/DL Final    ALT (SGPT) 08/04/2022 22  16 - 61 U/L Final    AST (SGOT) 08/04/2022 17  10 - 38 U/L Final    Alk. phosphatase 08/04/2022 67  45 - 117 U/L Final    Protein, total 08/04/2022 7.1  6.4 - 8.2 g/dL Final    Albumin 08/04/2022 3.8  3.4 - 5.0 g/dL Final    Globulin 08/04/2022 3.3  2.0 - 4.0 g/dL Final    A-G Ratio 08/04/2022 1.2  0.8 - 1.7   Final    LIPID PROFILE 08/04/2022        Final    Cholesterol, total 08/04/2022 130  <200 MG/DL Final    Triglyceride 08/04/2022 180 (A) <150 MG/DL Final    Comment: The drugs N-acetylcysteine (NAC) and  Metamiszole have been found to cause falsely  low results in this chemical assay. Please  be sure to submit blood samples obtained  BEFORE administration of either of these  drugs to assure correct results.       HDL Cholesterol 08/04/2022 33 (A) 40 - 60 MG/DL Final    LDL, calculated 08/04/2022 61  0 - 100 MG/DL Final    VLDL, calculated 08/04/2022 36  MG/DL Final    CHOL/HDL Ratio 08/04/2022 3.9  0 - 5.0   Final    TSH 08/04/2022 2.41  0.36 - 3.74 uIU/mL Final    T4, Free 08/04/2022 0.8  0.7 - 1.5 NG/DL Final    Hemoglobin A1c 08/04/2022 5.4  4.2 - 5.6 % Final    Comment: (NOTE)  HbA1C Interpretive Ranges  <5.7              Normal  5.7 - 6.4         Consider Prediabetes  >6.5              Consider Diabetes     Office Visit on 05/24/2022   Component Date Value Ref Range Status    Color (UA POC) 05/24/2022 Yellow   Final    Clarity (UA POC) 05/24/2022 Clear   Final    Glucose (UA POC) 05/24/2022 Negative  Negative Final    Bilirubin (UA POC) 05/24/2022 Negative  Negative Final    Ketones (UA POC) 05/24/2022 Negative  Negative Final    Specific gravity (UA POC) 05/24/2022 1.005  1.001 - 1.035 Final    Blood (UA POC) 05/24/2022 2+  Negative Final    pH (UA POC) 05/24/2022 5.5  4.6 - 8.0 Final    Protein (UA POC) 05/24/2022 Negative  Negative Final    Urobilinogen (UA POC) 05/24/2022 0.2 mg/dL  0.2 - 1 Final    Nitrites (UA POC) 05/24/2022 Negative  Negative Final    Leukocyte esterase (UA POC) 05/24/2022 Negative  Negative Final    PVR 05/24/2022 154  cc Final   Orders Only on 05/12/2022   Component Date Value Ref Range Status    APRESULT 05/12/2022 AP results   Final    Comment:    SURGICAL PATHOLOGY REPORT     CLINICAL INFORMATION:  Previous Biopsy Result:  No Previous Biopsy Performed  Treatment:  N/A  CAYLA:  Normal CAYLA  Other Pertinent Clinical History:  MRI pelvis 8/12/21: PIRADS 2; DNA kit # [id number]  Last PSA result:  4.67  Last PSA date:  03/08/2022  Number of Containers Submitted:  12     DIAGNOSIS:  A. Right [place]on Needle biopsy                 Benign prostatic tissue. B. Right Mid Needle biopsy                 Benign prostatic tissue. C. Right Base Needle biopsy                 Benign prostatic tissue. D. Right Lateral [place] Needle biopsy                 Benign prostatic tissue. E. Right Lateral Mid Needle biopsy                 Benign prostatic tissue. F. Right Lateral Base Needle biopsy                 Benign prostatic tissue. G. Left [place] Needle biopsy                 Benign prostatic tissue. H. Left Mid Needle biopsy                 ADENOCARCINOMA, DANUTA SCORE 3 + 3 = 6 involving 10 % of the specimen (1 of 1 core(s) positive). Grade Group 1. Ends not involved by the tumor. I. Left Base Needle biopsy                 Benign prostatic tissue. J. Left Lateral [place] Needle biopsy                 Benign prostatic tissue. K. Left Lateral Mid Needle biopsy                 Benign prostatic tissue.    L. Left Lateral Base Needle biopsy                 Benign prostatic tissue. GROSS DESCRIPTION:  SiteNo. of CoresCore 1 cmCore 2 cmCore 3 cmCore 4 cmNo. of cassettesSectioningInking  ARight Apex11. 21Blue  BRight Mid21.30. 91Green  CRight Base11. 11Black  DRight Lateral Apex11. 01Blue  ERight Lateral Mid11. 41Green  FRight Lateral Base11. 51Black  GLeft Apex11. 61Blue  HLeft Mid11. 41Green  ILeft Base11. 21Black  JLeft Lateral Apex11. 11Blue  KLeft Lateral Mid11. 01Green  LLeft Lateral Base10. 91Black     Note: The length and number of cores reported may differ from that noted during the processing proc                           edure or on receipt by the laboratory due to tissue shrinkage and/or fragmentation during processing. Immunhistochemical testing, when applicable, has been developed and its performance determined by the Immunohistochemistry Laboratory. It has not been cleared or approved by the U.S. Food and Drug Administration (FDA). The FDA has determined that such   clearance or approval is not necessary. These tests should be used as an aid for clinical purposes and integrated with other findings. This laboratory is certified to perform high complexity testing under the Clinical Laboratory Improvement Amendments of   1988.           Electronically Signed by [email or web address] MD            Current Diagnosis Submitted by Ordering Physician: [email or web address]

## 2022-08-05 NOTE — PROGRESS NOTES
All results are within normal range   Will get copy of colonoscopy report   Please monitor patient eating as much as you can also monitor symptoms of nausea and vomiting  And daily weight in the morning  And please call  at the end of next week with the status

## 2022-08-05 NOTE — PROGRESS NOTES
Informed daina of lab results and treatment plan. She stated that the contact information for the patient has changed and the person in charge now is [employer]. A copy of labs will be faxed.     New # [phone]

## 2022-09-12 ENCOUNTER — TELEPHONE (OUTPATIENT)
Dept: FAMILY MEDICINE CLINIC | Age: 52
End: 2022-09-12

## 2022-09-12 NOTE — TELEPHONE ENCOUNTER
Spoke w/[facility] where pt resides; faxes copy of urology notes from Dr. Vera Olson, visit dated 5/24/22; after consulting w/PCP, advised [place] [facility] to take pt to ER for further evaluation.

## 2022-09-12 NOTE — TELEPHONE ENCOUNTER
----- Message from James Randolph sent at 9/12/2022 12:46 PM EDT -----  Subject: Message to Provider    QUESTIONS  Information for Provider? Pt caregiver Shin Holley called to schedule   pt an appt due to pt throwing up daily for the past three months which has   recently worsened. Pt is also rapidly losing weight. Pt has been scheduled   for the next available appt 10/4/2022. Rudell Opitz is in hope to have that   appt scheduled for a sooner day. please advise.   ---------------------------------------------------------------------------  --------------  Марина SERRANO  [phone]; OK to leave message on voicemail  ---------------------------------------------------------------------------  --------------  SCRIPT ANSWERS  Relationship to Patient? Third Party  Third Party Type? Other  Other Third Party Type? Behavioral specialist / caregiver  Representative Name?  Shin Holley

## 2022-10-04 ENCOUNTER — APPOINTMENT (OUTPATIENT)
Dept: FAMILY MEDICINE CLINIC | Age: 52
End: 2022-10-04

## 2022-10-04 ENCOUNTER — HOSPITAL ENCOUNTER (OUTPATIENT)
Dept: LAB | Age: 52
Discharge: HOME | End: 2022-10-04
Payer: MEDICARE

## 2022-10-04 ENCOUNTER — OFFICE VISIT (OUTPATIENT)
Dept: FAMILY MEDICINE CLINIC | Age: 52
End: 2022-10-04
Payer: MEDICARE

## 2022-10-04 VITALS
WEIGHT: 169.6 LBS | HEIGHT: 71 IN | HEART RATE: 93 BPM | TEMPERATURE: 97.4 F | DIASTOLIC BLOOD PRESSURE: 82 MMHG | OXYGEN SATURATION: 97 % | SYSTOLIC BLOOD PRESSURE: 110 MMHG | BODY MASS INDEX: 23.74 KG/M2 | RESPIRATION RATE: 16 BRPM

## 2022-10-04 DIAGNOSIS — F09 COGNITIVE DISORDER: ICD-10-CM

## 2022-10-04 DIAGNOSIS — R11.2 NAUSEA AND VOMITING, UNSPECIFIED VOMITING TYPE: ICD-10-CM

## 2022-10-04 DIAGNOSIS — R79.89 ABNORMAL TSH: ICD-10-CM

## 2022-10-04 DIAGNOSIS — R63.4 WEIGHT LOSS: Primary | ICD-10-CM

## 2022-10-04 DIAGNOSIS — I10 ESSENTIAL HYPERTENSION: ICD-10-CM

## 2022-10-04 DIAGNOSIS — Z78.9 POOR HISTORIAN: ICD-10-CM

## 2022-10-04 DIAGNOSIS — Z23 NEEDS FLU SHOT: ICD-10-CM

## 2022-10-04 DIAGNOSIS — R09.81 NASAL CONGESTION: ICD-10-CM

## 2022-10-04 DIAGNOSIS — R05.9 COUGH: ICD-10-CM

## 2022-10-04 DIAGNOSIS — G93.1 ANOXIC BRAIN DAMAGE (HCC): ICD-10-CM

## 2022-10-04 DIAGNOSIS — R63.4 WEIGHT LOSS: ICD-10-CM

## 2022-10-04 DIAGNOSIS — G80.9 CEREBRAL PALSY, UNSPECIFIED TYPE (HCC): ICD-10-CM

## 2022-10-04 DIAGNOSIS — J06.9 URTI (ACUTE UPPER RESPIRATORY INFECTION): ICD-10-CM

## 2022-10-04 DIAGNOSIS — J30.9 ALLERGIC RHINITIS, UNSPECIFIED SEASONALITY, UNSPECIFIED TRIGGER: ICD-10-CM

## 2022-10-04 DIAGNOSIS — T14.8XXA SKIN ABRASION: ICD-10-CM

## 2022-10-04 LAB
ALBUMIN SERPL-MCNC: 3.8 G/DL (ref 3.4–5)
ALBUMIN/GLOB SERPL: 1.1 {RATIO} (ref 0.8–1.7)
ALP SERPL-CCNC: 67 U/L (ref 45–117)
ALT SERPL-CCNC: 19 U/L (ref 16–61)
ANION GAP SERPL CALC-SCNC: 3 MMOL/L (ref 3–18)
AST SERPL-CCNC: 13 U/L (ref 10–38)
BASOPHILS # BLD: 0 K/UL (ref 0–0.1)
BASOPHILS NFR BLD: 0 % (ref 0–2)
BILIRUB SERPL-MCNC: 0.5 MG/DL (ref 0.2–1)
BUN SERPL-MCNC: 15 MG/DL (ref 7–18)
BUN/CREAT SERPL: 15 (ref 12–20)
CALCIUM SERPL-MCNC: 9.3 MG/DL (ref 8.5–10.1)
CHLORIDE SERPL-SCNC: 103 MMOL/L (ref 100–111)
CO2 SERPL-SCNC: 31 MMOL/L (ref 21–32)
CREAT SERPL-MCNC: 0.98 MG/DL (ref 0.6–1.3)
DIFFERENTIAL METHOD BLD: NORMAL
EOSINOPHIL # BLD: 0.1 K/UL (ref 0–0.4)
EOSINOPHIL NFR BLD: 1 % (ref 0–5)
ERYTHROCYTE [DISTWIDTH] IN BLOOD BY AUTOMATED COUNT: 13.1 % (ref 11.6–14.5)
GLOBULIN SER CALC-MCNC: 3.4 G/DL (ref 2–4)
GLUCOSE SERPL-MCNC: 89 MG/DL (ref 74–99)
HCT VFR BLD AUTO: 43.2 % (ref 36–48)
HGB BLD-MCNC: 14.3 G/DL (ref 13–16)
IMM GRANULOCYTES # BLD AUTO: 0 K/UL (ref 0–0.04)
IMM GRANULOCYTES NFR BLD AUTO: 0 % (ref 0–0.5)
LYMPHOCYTES # BLD: 2 K/UL (ref 0.9–3.6)
LYMPHOCYTES NFR BLD: 23 % (ref 21–52)
MCH RBC QN AUTO: 30.4 PG (ref 24–34)
MCHC RBC AUTO-ENTMCNC: 33.1 G/DL (ref 31–37)
MCV RBC AUTO: 91.9 FL (ref 78–100)
MONOCYTES # BLD: 0.6 K/UL (ref 0.05–1.2)
MONOCYTES NFR BLD: 8 % (ref 3–10)
NEUTS SEG # BLD: 5.8 K/UL (ref 1.8–8)
NEUTS SEG NFR BLD: 68 % (ref 40–73)
NRBC # BLD: 0 K/UL (ref 0–0.01)
NRBC BLD-RTO: 0 PER 100 WBC
PLATELET # BLD AUTO: 305 K/UL (ref 135–420)
PMV BLD AUTO: 9.5 FL (ref 9.2–11.8)
POTASSIUM SERPL-SCNC: 4.5 MMOL/L (ref 3.5–5.5)
PROT SERPL-MCNC: 7.2 G/DL (ref 6.4–8.2)
RBC # BLD AUTO: 4.7 M/UL (ref 4.35–5.65)
SODIUM SERPL-SCNC: 137 MMOL/L (ref 136–145)
T4 FREE SERPL-MCNC: 1 NG/DL (ref 0.7–1.5)
TSH SERPL DL<=0.05 MIU/L-ACNC: 3.14 UIU/ML (ref 0.36–3.74)
WBC # BLD AUTO: 8.5 K/UL (ref 4.6–13.2)

## 2022-10-04 PROCEDURE — G0008 ADMIN INFLUENZA VIRUS VAC: HCPCS | Performed by: LEGAL MEDICINE

## 2022-10-04 PROCEDURE — G8754 DIAS BP LESS 90: HCPCS | Performed by: LEGAL MEDICINE

## 2022-10-04 PROCEDURE — 36415 COLL VENOUS BLD VENIPUNCTURE: CPT

## 2022-10-04 PROCEDURE — G8420 CALC BMI NORM PARAMETERS: HCPCS | Performed by: LEGAL MEDICINE

## 2022-10-04 PROCEDURE — 3017F COLORECTAL CA SCREEN DOC REV: CPT | Performed by: LEGAL MEDICINE

## 2022-10-04 PROCEDURE — 84439 ASSAY OF FREE THYROXINE: CPT

## 2022-10-04 PROCEDURE — G8752 SYS BP LESS 140: HCPCS | Performed by: LEGAL MEDICINE

## 2022-10-04 PROCEDURE — G8427 DOCREV CUR MEDS BY ELIG CLIN: HCPCS | Performed by: LEGAL MEDICINE

## 2022-10-04 PROCEDURE — 90686 IIV4 VACC NO PRSV 0.5 ML IM: CPT | Performed by: LEGAL MEDICINE

## 2022-10-04 PROCEDURE — G9717 DOC PT DX DEP/BP F/U NT REQ: HCPCS | Performed by: LEGAL MEDICINE

## 2022-10-04 PROCEDURE — 99214 OFFICE O/P EST MOD 30 MIN: CPT | Performed by: LEGAL MEDICINE

## 2022-10-04 PROCEDURE — 80053 COMPREHEN METABOLIC PANEL: CPT

## 2022-10-04 PROCEDURE — 85025 COMPLETE CBC W/AUTO DIFF WBC: CPT

## 2022-10-04 RX ORDER — BACITRACIN ZINC 500 UNIT/G
OINTMENT (GRAM) TOPICAL 2 TIMES DAILY
Qty: 15 G | Refills: 0 | Status: SHIPPED | OUTPATIENT
Start: 2022-10-04

## 2022-10-04 RX ORDER — GUAIFENESIN 600 MG/1
600 TABLET, EXTENDED RELEASE ORAL 2 TIMES DAILY
Qty: 20 TABLET | Refills: 5 | Status: SHIPPED | OUTPATIENT
Start: 2022-10-04

## 2022-10-04 RX ORDER — ACETAMINOPHEN 500 MG
500 TABLET ORAL
Qty: 30 TABLET | Refills: 2 | Status: SHIPPED | OUTPATIENT
Start: 2022-10-04

## 2022-10-04 RX ORDER — MULTIVITAMIN/IRON/FOLIC ACID 18MG-0.4MG
1 TABLET ORAL DAILY
Qty: 90 TABLET | Refills: 3 | Status: SHIPPED | OUTPATIENT
Start: 2022-10-04 | End: 2023-01-02

## 2022-10-04 RX ORDER — MULTIVITAMIN/IRON/FOLIC ACID 18MG-0.4MG
TABLET ORAL
COMMUNITY
Start: 2022-09-14 | End: 2022-10-04 | Stop reason: SDUPTHER

## 2022-10-04 RX ORDER — FLUTICASONE PROPIONATE 50 MCG
2 SPRAY, SUSPENSION (ML) NASAL DAILY
Qty: 1 EACH | Refills: 5 | Status: SHIPPED | OUTPATIENT
Start: 2022-10-04

## 2022-10-04 NOTE — PROGRESS NOTES
Greg Doss is a 47 y.o. male (: 1970) presenting to address:    Chief Complaint   Patient presents with    Vomiting     Follow up        Vitals:    10/04/22 0952   BP: 110/82   Pulse: 93   Resp: 16   Temp: 97.4 °F (36.3 °C)   TempSrc: Temporal   SpO2: 97%   Weight: 169 lb 9.6 oz (76.9 kg)   Height: 5' 11\" (1.803 m)   PainSc:   0 - No pain       Hearing/Vision:   No results found. Learning Assessment:     Learning Assessment 9/10/2019   PRIMARY LEARNER Patient   HIGHEST LEVEL OF EDUCATION - PRIMARY LEARNER  -   BARRIERS PRIMARY LEARNER -   CO-LEARNER CAREGIVER -   PRIMARY LANGUAGE ENGLISH   LEARNER PREFERENCE PRIMARY READING     LISTENING     VIDEOS   ANSWERED BY patient   RELATIONSHIP SELF     Depression Screening:     3 most recent PHQ Screens 10/4/2022   PHQ Not Done Functional capacity motivation limits accuracy   Little interest or pleasure in doing things -   Feeling down, depressed, irritable, or hopeless -   Total Score PHQ 2 -     Fall Risk Assessment:     Fall Risk Assessment, last 12 mths 2022   Able to walk? Yes   Fall in past 12 months? 0   Do you feel unsteady? 0   Are you worried about falling 0   Number of falls in past 12 months -   Fall with injury? -     Abuse Screening:     Abuse Screening Questionnaire 2022   Do you ever feel afraid of your partner? N   Are you in a relationship with someone who physically or mentally threatens you? N   Is it safe for you to go home?  Y     ADL Assessment:     ADL Assessment 2021   Feeding yourself No Help Needed   Getting from bed to chair No Help Needed   Getting dressed No Help Needed   Bathing or showering No Help Needed   Walk across the room (includes cane/walker) No Help Needed   Using the telphone Help Needed   Taking your medications Help Needed   Preparing meals Help Needed   Managing money (expenses/bills) Help Needed   Moderately strenuous housework (laundry) Help Needed   Shopping for personal items (toiletries/medicines) Help Needed   Shopping for groceries Help Needed   Driving Help Needed   Climbing a flight of stairs Help Needed   Getting to places beyond walking distances Help Needed        Coordination of Care Questionaire:   1. \"Have you been to the ER, urgent care clinic since your last visit? Hospitalized since your last visit? \" No    2. \"Have you seen or consulted any other health care providers outside of the [address] [place] [address] [place] since your last visit? \" No     3. For patients aged 39-70: Has the patient had a colonoscopy / FIT/ Cologuard? No      If the patient is female:    4. For patients aged 41-77: Has the patient had a mammogram within the past 2 years? NA - based on age or sex  See top three    5. For patients aged 21-65: Has the patient had a pap smear? NA - based on age or sex    Advanced Directive:   1. Do you have an Advanced Directive? NO    2. Would you like information on Advanced Directives?  NO

## 2022-10-04 NOTE — PROGRESS NOTES
Kaitlynn Smith     Chief Complaint   Patient presents with    Vomiting     Follow up      Vitals:    10/04/22 0952   BP: 110/82   Pulse: 93   Resp: 16   Temp: 97.4 °F (36.3 °C)   TempSrc: Temporal   SpO2: 97%   Weight: 169 lb 9.6 oz (76.9 kg)   Height: 5' 11\" (1.803 m)   PainSc:   0 - No pain         HPI:Patient is here with facility staff     Has been having weight loss  about 15 lbs   He has normal  appetite , improved nausea and he is vomiting not sure since he is living by himself, he is already historian and has mental disability    He lost 5 lbs since last visit  Urologist has stopped [employer] thinking that has been causing nausea and vomiting he stopped the medication about 3 weeks ago  And he lost weight since last visit in August 4        Past Medical History:   Diagnosis Date    [facility] ([facility]     Depression 02/01/2005    recurrent episode, Major Depression    Psychotic disorder ([facility] 12/07/2007    Intellectual disability     Vertigo 03/17/2018     Past Surgical History:   Procedure Laterality Date    HX TRACHEOSTOMY N/A 1985    HX UROLOGICAL  05/12/2022    CYSTOSCOPY; TRANSPERINEAL PROSTATE BIOPSY; Dr. Aris Alonso History     Tobacco Use    Smoking status: Never    Smokeless tobacco: Never   Substance Use Topics    Alcohol use: Yes       Family History   Family history unknown: Yes       Review of Systems   Constitutional:  Positive for weight loss. Negative for chills, diaphoresis, fever and malaise/fatigue. HENT:  Negative for congestion, ear discharge, ear pain, hearing loss, nosebleeds, sinus pain, sore throat and tinnitus. Eyes:  Negative for blurred vision, double vision, photophobia, pain and discharge. Respiratory:  Negative for cough, hemoptysis, sputum production, shortness of breath, wheezing and stridor. Cardiovascular:  Negative for chest pain, palpitations, claudication and leg swelling.    Gastrointestinal:  Negative for abdominal pain, blood in stool, constipation, diarrhea, nausea and vomiting. Genitourinary:  Negative for dysuria, flank pain, frequency, hematuria and urgency. Musculoskeletal:  Negative for back pain, falls, joint pain, myalgias and neck pain. Skin:  Negative for itching and rash. Neurological:  Negative for dizziness, tingling, tremors, sensory change, speech change, focal weakness, weakness and headaches. Psychiatric/Behavioral:  Negative for depression, hallucinations, memory loss, substance abuse and suicidal ideas. The patient is not nervous/anxious and does not have insomnia. Physical Exam  Vitals and nursing note reviewed. Exam conducted with a chaperone present. Constitutional:       General: He is not in acute distress. Appearance: Normal appearance. He is well-developed. He is not ill-appearing, toxic-appearing or diaphoretic. HENT:      Head: Normocephalic and atraumatic. Eyes:      General: No scleral icterus. Right eye: No discharge. Left eye: No discharge. Conjunctiva/sclera: Conjunctivae normal.   Neck:      Thyroid: No thyromegaly. Cardiovascular:      Rate and Rhythm: Normal rate and regular rhythm. Heart sounds: Normal heart sounds. Pulmonary:      Effort: Pulmonary effort is normal. No respiratory distress. Breath sounds: Normal breath sounds. No stridor. No wheezing, rhonchi or rales. Chest:      Chest wall: No tenderness. Abdominal:      General: Bowel sounds are normal. There is no distension. Palpations: Abdomen is soft. There is no mass. Tenderness: There is no abdominal tenderness. There is no right CVA tenderness, left CVA tenderness, guarding or rebound. Hernia: No hernia is present. Musculoskeletal:         General: No tenderness or deformity. Normal range of motion. Cervical back: Normal range of motion. No rigidity or tenderness. Right lower leg: No edema. Left lower leg: No edema.    Lymphadenopathy:      Cervical: No cervical adenopathy. Skin:     General: Skin is warm and dry. Findings: No erythema or rash. Neurological:      Mental Status: He is alert and oriented to person, place, and time. Mental status is at baseline. Psychiatric:         Mood and Affect: Mood normal.         Behavior: Behavior normal.         Thought Content: Thought content normal.         Judgment: Judgment normal.        Assessment and plan     Plan of care has been discussed with the patient, he agrees to the plan and verbalized understanding. All his questions were answered  More than 50% of the time spent in this visit was counseling the patient about  illness and treatment options         1. Weight loss    - TSH AND FREE T4; Future  - US ABD COMP; Future  - REFERRAL TO GASTROENTEROLOGY    2. Needs flu shot  Vaccine was given with no complications  - INFLUENZA, FLUARIX, FLULAVAL, FLUZONE (AGE 6 MO+), AFLURIA(AGE 3Y+) IM, PF, 0.5 ML    3. Skin abrasion  Only as needed he does not have any open skin abrasion right now  - bacitracin zinc (BACITRACIN) ointment; Apply  to affected area two (2) times a day. Dispense: 15 g; Refill: 0    4. Allergic rhinitis, unspecified seasonality, unspecified trigger    - fluticasone propionate (FLONASE) 50 mcg/actuation nasal spray; 2 Sprays by Both Nostrils route daily. Dispense: 1 Each; Refill: 5    5. URTI (acute upper respiratory infection)  This medication use as needed if he get a mild cold or cough  - acetaminophen (TYLENOL) 500 mg tablet; Take 1 Tablet by mouth every six (6) hours as needed for Pain. And for fever or headaches  Indications: pain  Dispense: 30 Tablet; Refill: 2  - guaiFENesin ER (MUCINEX) 600 mg ER tablet; Take 1 Tablet by mouth two (2) times a day. As needed for cough  Dispense: 20 Tablet; Refill: 5    6. Cough  Only as needed  - guaiFENesin ER (MUCINEX) 600 mg ER tablet; Take 1 Tablet by mouth two (2) times a day. As needed for cough  Dispense: 20 Tablet; Refill: 5    7.  Nasal congestion  Only as needed  - sodium chloride (OCEAN) 0.65 % nasal squeeze bottle; 0.05 mL by Both Nostrils route as needed for Congestion. Dispense: 104 mL; Refill: 11  - REFERRAL TO GASTROENTEROLOGY    8. Essential hypertension  Blood pressure well controlled  - METABOLIC PANEL, COMPREHENSIVE; Future  - CBC WITH AUTOMATED DIFF; Future    9. Anoxic brain damage (HCC)    - US ABD COMP; Future  - REFERRAL TO GASTROENTEROLOGY    10. Cognitive disorder      11. Cerebral palsy, unspecified type ([address]    - US ABD COMP; Future  - REFERRAL TO GASTROENTEROLOGY    12. Abnormal TSH      13. Nausea and vomiting, unspecified vomiting type  Patient needing ultrasound to rule out any gallbladder disease causing nausea and vomiting  Also he need evaluation by gastroenterology for weight loss  - US ABD COMP; Future  - REFERRAL TO GASTROENTEROLOGY    14. Poor historian    - US ABD COMP; Future  - REFERRAL TO GASTROENTEROLOGY  Current Outpatient Medications   Medication Sig Dispense Refill    bacitracin zinc (BACITRACIN) ointment Apply  to affected area two (2) times a day. 15 g 0    fluticasone propionate (FLONASE) 50 mcg/actuation nasal spray 2 Sprays by Both Nostrils route daily. 1 Each 5    acetaminophen (TYLENOL) 500 mg tablet Take 1 Tablet by mouth every six (6) hours as needed for Pain. And for fever or headaches  Indications: pain 30 Tablet 2    guaiFENesin ER (MUCINEX) 600 mg ER tablet Take 1 Tablet by mouth two (2) times a day. As needed for cough 20 Tablet 5    sodium chloride (OCEAN) 0.65 % nasal squeeze bottle 0.05 mL by Both Nostrils route as needed for Congestion. 508 mL 11    Certavite-Antioxidant  mg-mcg tab tablet Take 1 Tablet by mouth daily for 90 days. 90 Tablet 3    mirabegron ER (Myrbetriq) 50 mg ER tablet Take 1 Tablet by mouth daily.  Indications: an increased need to urinate often, urinary urgency, or the sudden urge to urinate 30 Tablet 2    SF 5000 Plus 1.1 % crea       ARIPiprazole (ABILIFY) 5 mg tablet       temazepam (RESTORIL) 15 mg capsule       FLUoxetine (PROZAC) 20 mg capsule TK 1 C PO QD  5       Patient Active Problem List    Diagnosis Date Noted    Abnormal TSH 09/10/2019    Urine frequency 09/10/2019    Anoxic brain damage ([facility] 06/13/2018    H/O traumatic brain injury 06/13/2018    Ataxia 03/15/2018    [facility] ([facility] 03/15/2018    Vertigo 03/15/2018    Cognitive disorder 04/13/2016    Insomnia, unspecified 04/13/2016    Major depressive disorder 04/13/2016    CP (cerebral palsy) ([facility] 12/01/2011    Essential hypertension 01/01/1901    Psychotic disorder ([facility] 01/01/1901    Sleep disorder 01/01/1901    Syncope and collapse 01/01/1901     Results for orders placed or performed in visit on 09/14/22   AMB POC URINALYSIS DIP STICK AUTO W/O MICRO   Result Value Ref Range    Color (UA POC) Yellow     Clarity (UA POC) Clear     Glucose (UA POC) Negative Negative    Bilirubin (UA POC) Negative Negative    Ketones (UA POC) Negative Negative    Specific gravity (UA POC) 1.010 1.001 - 1.035    Blood (UA POC) Negative Negative    pH (UA POC) 6.0 4.6 - 8.0    Protein (UA POC) Negative Negative    Urobilinogen (UA POC) 0.2 mg/dL 0.2 - 1    Nitrites (UA POC) Negative Negative    Leukocyte esterase (UA POC) Negative Negative     Office Visit on 09/14/2022   Component Date Value Ref Range Status    Color (UA POC) 09/14/2022 Yellow   Final    Clarity (UA POC) 09/14/2022 Clear   Final    Glucose (UA POC) 09/14/2022 Negative  Negative Final    Bilirubin (UA POC) 09/14/2022 Negative  Negative Final    Ketones (UA POC) 09/14/2022 Negative  Negative Final    Specific gravity (UA POC) 09/14/2022 1.010  1.001 - 1.035 Final    Blood (UA POC) 09/14/2022 Negative  Negative Final    pH (UA POC) 09/14/2022 6.0  4.6 - 8.0 Final    Protein (UA POC) 09/14/2022 Negative  Negative Final    Urobilinogen (UA POC) 09/14/2022 0.2 mg/dL  0.2 - 1 Final    Nitrites (UA POC) 09/14/2022 Negative  Negative Final    Leukocyte esterase (UA POC) 09/14/2022 Negative  Negative Final   Hospital Outpatient Visit on 08/04/2022   Component Date Value Ref Range Status    WBC 08/04/2022 5.3  4.6 - 13.2 K/uL Final    RBC 08/04/2022 4.54  4.35 - 5.65 M/uL Final    HGB 08/04/2022 14.1  13.0 - 16.0 g/dL Final    HCT 08/04/2022 42.0  36.0 - 48.0 % Final    MCV 08/04/2022 92.5  78.0 - 100.0 FL Final    MCH 08/04/2022 31.1  24.0 - 34.0 PG Final    MCHC 08/04/2022 33.6  31.0 - 37.0 g/dL Final    RDW 08/04/2022 12.7  11.6 - 14.5 % Final    PLATELET 14/15/1323 449  135 - 420 K/uL Final    MPV 08/04/2022 9.1 (A)  9.2 - 11.8 FL Final    NRBC 08/04/2022 0.0  0  WBC Final    ABSOLUTE NRBC 08/04/2022 0.00  0.00 - 0.01 K/uL Final    NEUTROPHILS 08/04/2022 55  40 - 73 % Final    LYMPHOCYTES 08/04/2022 34  21 - 52 % Final    MONOCYTES 08/04/2022 9  3 - 10 % Final    EOSINOPHILS 08/04/2022 1  0 - 5 % Final    BASOPHILS 08/04/2022 0  0 - 2 % Final    IMMATURE GRANULOCYTES 08/04/2022 0  0.0 - 0.5 % Final    ABS. NEUTROPHILS 08/04/2022 2.9  1.8 - 8.0 K/UL Final    ABS. LYMPHOCYTES 08/04/2022 1.8  0.9 - 3.6 K/UL Final    ABS. MONOCYTES 08/04/2022 0.5  0.05 - 1.2 K/UL Final    ABS. EOSINOPHILS 08/04/2022 0.1  0.0 - 0.4 K/UL Final    ABS. BASOPHILS 08/04/2022 0.0  0.0 - 0.1 K/UL Final    ABS. IMM.  GRANS. 08/04/2022 0.0  0.00 - 0.04 K/UL Final    DF 08/04/2022 AUTOMATED    Final    Sodium 08/04/2022 142  136 - 145 mmol/L Final    Potassium 08/04/2022 4.5  3.5 - 5.5 mmol/L Final    Chloride 08/04/2022 108  100 - 111 mmol/L Final    CO2 08/04/2022 31  21 - 32 mmol/L Final    Anion gap 08/04/2022 3  3.0 - 18 mmol/L Final    Glucose 08/04/2022 98  74 - 99 mg/dL Final    BUN 08/04/2022 11  7.0 - 18 MG/DL Final    Creatinine 08/04/2022 0.93  0.6 - 1.3 MG/DL Final    BUN/Creatinine ratio 08/04/2022 12  12 - 20   Final    GFR est AA 08/04/2022 >60  >60 ml/min/1.73m2 Final    GFR est non-AA 08/04/2022 >60  >60 ml/min/1.73m2 Final    Comment: (NOTE)  Estimated GFR is calculated using the Modification of Diet in Renal   Disease (MDRD) Study equation, reported for both  Americans   (GFRAA) and non- Americans (GFRNA), and normalized to 1.73m2   body surface area. The physician must decide which value applies to   the patient. The MDRD study equation should only be used in   individuals age 25 or older. It has not been validated for the   following: pregnant women, patients with serious comorbid conditions,   or on certain medications, or persons with extremes of body size,   muscle mass, or nutritional status. Calcium 08/04/2022 8.6  8.5 - 10.1 MG/DL Final    Bilirubin, total 08/04/2022 0.3  0.2 - 1.0 MG/DL Final    ALT (SGPT) 08/04/2022 22  16 - 61 U/L Final    AST (SGOT) 08/04/2022 17  10 - 38 U/L Final    Alk. phosphatase 08/04/2022 67  45 - 117 U/L Final    Protein, total 08/04/2022 7.1  6.4 - 8.2 g/dL Final    Albumin 08/04/2022 3.8  3.4 - 5.0 g/dL Final    Globulin 08/04/2022 3.3  2.0 - 4.0 g/dL Final    A-G Ratio 08/04/2022 1.2  0.8 - 1.7   Final    LIPID PROFILE 08/04/2022        Final    Cholesterol, total 08/04/2022 130  <200 MG/DL Final    Triglyceride 08/04/2022 180 (A)  <150 MG/DL Final    Comment: The drugs N-acetylcysteine (NAC) and  Metamiszole have been found to cause falsely  low results in this chemical assay. Please  be sure to submit blood samples obtained  BEFORE administration of either of these  drugs to assure correct results.       HDL Cholesterol 08/04/2022 33 (A)  40 - 60 MG/DL Final    LDL, calculated 08/04/2022 61  0 - 100 MG/DL Final    VLDL, calculated 08/04/2022 36  MG/DL Final    CHOL/HDL Ratio 08/04/2022 3.9  0 - 5.0   Final    TSH 08/04/2022 2.41  0.36 - 3.74 uIU/mL Final    T4, Free 08/04/2022 0.8  0.7 - 1.5 NG/DL Final    Hemoglobin A1c 08/04/2022 5.4  4.2 - 5.6 % Final    Comment: (NOTE)  HbA1C Interpretive Ranges  <5.7              Normal  5.7 - 6.4         Consider Prediabetes  >6.5              Consider Diabetes

## 2022-10-07 ENCOUNTER — TELEPHONE (OUTPATIENT)
Dept: FAMILY MEDICINE CLINIC | Age: 52
End: 2022-10-07

## 2022-10-17 ENCOUNTER — TELEPHONE (OUTPATIENT)
Dept: FAMILY MEDICINE CLINIC | Age: 52
End: 2022-10-17

## 2022-10-17 NOTE — TELEPHONE ENCOUNTER
Pablo Carrera  called on pt's behalf. Joshua is requesting documentation stating that the pt's bacitracin has been discontinued and when the medication was discontinued. Joshua stated that when the staff brought the pt in for appt they did not retrieve the script. Joshua also needs an updated order for the pt BP. Pt's bp is taken daily and the current order runs out 10/21/22 and his bp needs continual monitoring daily.  --Fax number [phone]

## 2022-10-18 ENCOUNTER — TELEPHONE (OUTPATIENT)
Dept: FAMILY MEDICINE CLINIC | Age: 52
End: 2022-10-18

## 2022-10-18 NOTE — TELEPHONE ENCOUNTER
Allen Suarez will fax over form for DC of medication for bacitracin. However Allen Suarez stated that she still needs an updated order for the pt BP. Pt's bp is taken daily and the current order runs out 10/21/22 and his bp needs continual monitoring daily.

## 2022-11-04 ENCOUNTER — TELEPHONE (OUTPATIENT)
Dept: FAMILY MEDICINE CLINIC | Age: 52
End: 2022-11-04

## 2022-11-04 DIAGNOSIS — R09.81 NASAL CONGESTION: ICD-10-CM

## 2022-11-04 NOTE — TELEPHONE ENCOUNTER
----- Message from Pearl Hall sent at 11/3/2022  9:16 AM EDT -----  Subject: Message to Provider    QUESTIONS  Information for Provider? A e-script was sent for the pt, for sodium   chloride (OCEAN) 0.65 % nasal squeeze bottle. . They said it needs to be   adjusted it says 0.05 ml by bth nostrils. It is a spray so it need to say   how many sprays per nostril. Please call back to update this. It also   needs to say how many times pt needs to use it a dy like1 x a day or how   many. Please resend order. ---------------------------------------------------------------------------  --------------  [employer] INFO  [phone]; Do not leave any message, patient will call back for answer  ---------------------------------------------------------------------------  --------------  SCRIPT ANSWERS  Relationship to Patient? Third Party  Third Party Type? Pharmacy?    Representative Name? [facility] pharmacy

## 2022-11-09 ENCOUNTER — OFFICE VISIT (OUTPATIENT)
Dept: FAMILY MEDICINE CLINIC | Age: 52
End: 2022-11-09
Payer: MEDICARE

## 2022-11-09 VITALS
HEIGHT: 71 IN | BODY MASS INDEX: 22.6 KG/M2 | TEMPERATURE: 97.6 F | HEART RATE: 62 BPM | DIASTOLIC BLOOD PRESSURE: 60 MMHG | RESPIRATION RATE: 15 BRPM | SYSTOLIC BLOOD PRESSURE: 110 MMHG | OXYGEN SATURATION: 98 % | WEIGHT: 161.4 LBS

## 2022-11-09 DIAGNOSIS — H61.23 BILATERAL IMPACTED CERUMEN: ICD-10-CM

## 2022-11-09 DIAGNOSIS — I10 ESSENTIAL HYPERTENSION: ICD-10-CM

## 2022-11-09 DIAGNOSIS — N39.41 URGE INCONTINENCE OF URINE: ICD-10-CM

## 2022-11-09 DIAGNOSIS — Z91.89 NEED FOR DENTAL CARE: ICD-10-CM

## 2022-11-09 DIAGNOSIS — Z87.820 H/O TRAUMATIC BRAIN INJURY: ICD-10-CM

## 2022-11-09 DIAGNOSIS — R73.09 ABNORMAL BLOOD SUGAR: ICD-10-CM

## 2022-11-09 DIAGNOSIS — R63.4 WEIGHT LOSS: ICD-10-CM

## 2022-11-09 DIAGNOSIS — G80.9 CEREBRAL PALSY, UNSPECIFIED TYPE (HCC): ICD-10-CM

## 2022-11-09 DIAGNOSIS — Z00.00 MEDICARE ANNUAL WELLNESS VISIT, SUBSEQUENT: Primary | ICD-10-CM

## 2022-11-09 PROCEDURE — G8752 SYS BP LESS 140: HCPCS | Performed by: LEGAL MEDICINE

## 2022-11-09 PROCEDURE — G8754 DIAS BP LESS 90: HCPCS | Performed by: LEGAL MEDICINE

## 2022-11-09 PROCEDURE — 3078F DIAST BP <80 MM HG: CPT | Performed by: LEGAL MEDICINE

## 2022-11-09 PROCEDURE — 3017F COLORECTAL CA SCREEN DOC REV: CPT | Performed by: LEGAL MEDICINE

## 2022-11-09 PROCEDURE — G9717 DOC PT DX DEP/BP F/U NT REQ: HCPCS | Performed by: LEGAL MEDICINE

## 2022-11-09 PROCEDURE — G8420 CALC BMI NORM PARAMETERS: HCPCS | Performed by: LEGAL MEDICINE

## 2022-11-09 PROCEDURE — G8427 DOCREV CUR MEDS BY ELIG CLIN: HCPCS | Performed by: LEGAL MEDICINE

## 2022-11-09 PROCEDURE — 3074F SYST BP LT 130 MM HG: CPT | Performed by: LEGAL MEDICINE

## 2022-11-09 PROCEDURE — G0439 PPPS, SUBSEQ VISIT: HCPCS | Performed by: LEGAL MEDICINE

## 2022-11-09 RX ORDER — FLUDROCORTISONE ACETATE 0.1 MG/1
TABLET ORAL
COMMUNITY
Start: 2022-10-12

## 2022-11-09 RX ORDER — 1.1% SODIUM FLUORIDE PRESCRIPTION DENTAL CREAM 5 MG/G
CREAM DENTAL
Qty: 51 G | Refills: 3 | Status: SHIPPED | OUTPATIENT
Start: 2022-11-09

## 2022-11-09 RX ORDER — MULTIVITAMIN/IRON/FOLIC ACID 18MG-0.4MG
1 TABLET ORAL DAILY
Qty: 90 TABLET | Refills: 3 | Status: SHIPPED | OUTPATIENT
Start: 2022-11-09 | End: 2023-11-08

## 2022-11-09 NOTE — PROGRESS NOTES
(AWV) The Initial Medicare Annual Wellness Exam PROGRESS NOTE    This is an Initial Medicare Annual Wellness Exam (AWV) (Performed 12 months after IPPE or effective date of Medicare Part B enrollment, Once in a lifetime)    I have reviewed the patient's medical history in detail and updated the computerized patient record. Mushtaq Luke is a 47 y.o.  male and presents for an annual wellness exam   He is accompanied today by behavioral specialist Alexander Clayton    There has been a concern with the patient and continue his weight loss, despite normal appetite    He lost about 22 pounds in the last 6 months    Patient Active Problem List   Diagnosis Code    Anoxic brain damage ([facility] G93.1    H/O traumatic brain injury Z87.820    Abnormal TSH R79.89    Urine frequency R35.0    Ataxia R27.0    Cognitive disorder F09    CP (cerebral palsy) ([facility] G80.9    Insomnia, unspecified G47.00    Major depressive disorder F32.9    Anoxic brain injury ([facility] G93.1    Vertigo R42    Essential hypertension I10    Psychotic disorder ([address] F29    Sleep disorder G47.9    Syncope and collapse R55     Patient Active Problem List    Diagnosis Date Noted    Abnormal TSH 09/10/2019    Urine frequency 09/10/2019    Anoxic brain damage ([facility] 06/13/2018    H/O traumatic brain injury 06/13/2018    Ataxia 03/15/2018    Anoxic brain injury ([address] 03/15/2018    Vertigo 03/15/2018    Cognitive disorder 04/13/2016    Insomnia, unspecified 04/13/2016    Major depressive disorder 04/13/2016    CP (cerebral palsy) ([address] 12/01/2011    Essential hypertension 01/01/1901    Psychotic disorder ([address] 01/01/1901    Sleep disorder 01/01/1901    Syncope and collapse 01/01/1901     Current Outpatient Medications   Medication Sig Dispense Refill    fludrocortisone (FLORINEF) 0.1 mg tablet       Certavite-Antioxidant  mg-mcg tab tablet Take 1 Tablet by mouth daily for 364 days.  90 Tablet 3    SF 5000 Plus 1.1 % crea Toothpaste use 2 times per day 51 g 3    sodium chloride (OCEAN) 0.65 % nasal squeeze bottle 0.05 mL by Both Nostrils route two (2) times daily as needed for Congestion for up to 30 days. 104 mL 11    fluticasone propionate (FLONASE) 50 mcg/actuation nasal spray 2 Sprays by Both Nostrils route daily. 1 Each 5    acetaminophen (TYLENOL) 500 mg tablet Take 1 Tablet by mouth every six (6) hours as needed for Pain. And for fever or headaches  Indications: pain 30 Tablet 2    guaiFENesin ER (MUCINEX) 600 mg ER tablet Take 1 Tablet by mouth two (2) times a day. As needed for cough 20 Tablet 5    mirabegron ER (Myrbetriq) 50 mg ER tablet Take 1 Tablet by mouth daily. Indications: an increased need to urinate often, urinary urgency, or the sudden urge to urinate 30 Tablet 2    ARIPiprazole (ABILIFY) 5 mg tablet       temazepam (RESTORIL) 15 mg capsule       FLUoxetine (PROZAC) 20 mg capsule TK 1 C PO QD  5    bacitracin zinc (BACITRACIN) ointment Apply  to affected area two (2) times a day.  (Patient not taking: Reported on 11/9/2022) 15 g 0     No Known Allergies  Past Medical History:   Diagnosis Date    Brain anoxic injury ([facility]     Depression 02/01/2005    recurrent episode, Major Depression    Psychotic disorder ([facility] 12/07/2007    Intellectual disability     Vertigo 03/17/2018     Past Surgical History:   Procedure Laterality Date    HX TRACHEOSTOMY N/A 1985    HX UROLOGICAL  05/12/2022    CYSTOSCOPY; TRANSPERINEAL PROSTATE BIOPSY; Dr. Sapphire Mahoney     Family History   Family history unknown: Yes     Social History     Tobacco Use    Smoking status: Never    Smokeless tobacco: Never   Substance Use Topics    Alcohol use: Yes       ROS   History obtained from   and the patient and Devan Antonio specialist   General ROS: negative for - chills, fatigue or fever  Psychological ROS: positive for - anxiety, behavioral disorder, concentration difficulties, hostility, memory difficulties and mood swings  negative for - suicidal ideation  Ophthalmic ROS: negative for - blurry vision, decreased vision or double vision  ENT ROS: negative for - epistaxis or headaches  Allergy and Immunology ROS: negative  negative for - hives or itchy/watery eyes  Hematological and Lymphatic ROS: negative for - bleeding problems, blood clots, blood transfusions or bruisin     Respiratory ROS: no cough, shortness of breath, or wheezing  Cardiovascular ROS: no chest pain or dyspnea on exertion  Gastrointestinal ROS: has abdominal pain, no change in bowel habits, or black or bloody stools  Genito-Urinary ROS: positive for - incontinence and urinary frequency/urgency     Musculoskeletal ROS: positive for - joint pain, joint stiffness and muscle pain  negative for - joint swelling  Neurological ROS: no TIA or stroke symptoms  Dermatological ROS: negative for - mole changes or skin lesion changes           History     Past Medical History:   Diagnosis Date    [facility]     Depression 02/01/2005    recurrent episode, Major Depression    Psychotic disorder ([facility] 12/07/2007    Intellectual disability     Vertigo 03/17/2018      Past Surgical History:   Procedure Laterality Date    HX TRACHEOSTOMY N/A 1985    HX UROLOGICAL  05/12/2022    CYSTOSCOPY; TRANSPERINEAL PROSTATE BIOPSY; Dr. Tracy Mendieta     Current Outpatient Medications   Medication Sig Dispense Refill    fludrocortisone (FLORINEF) 0.1 mg tablet       Certavite-Antioxidant  mg-mcg tab tablet Take 1 Tablet by mouth daily for 364 days. 90 Tablet 3    SF 5000 Plus 1.1 % crea Toothpaste use 2 times per day 51 g 3    sodium chloride (OCEAN) 0.65 % nasal squeeze bottle 0.05 mL by Both Nostrils route two (2) times daily as needed for Congestion for up to 30 days. 104 mL 11    fluticasone propionate (FLONASE) 50 mcg/actuation nasal spray 2 Sprays by Both Nostrils route daily. 1 Each 5    acetaminophen (TYLENOL) 500 mg tablet Take 1 Tablet by mouth every six (6) hours as needed for Pain.  And for fever or headaches  Indications: pain 30 Tablet 2    guaiFENesin ER (MUCINEX) 600 mg ER tablet Take 1 Tablet by mouth two (2) times a day. As needed for cough 20 Tablet 5    mirabegron ER (Myrbetriq) 50 mg ER tablet Take 1 Tablet by mouth daily. Indications: an increased need to urinate often, urinary urgency, or the sudden urge to urinate 30 Tablet 2    ARIPiprazole (ABILIFY) 5 mg tablet       temazepam (RESTORIL) 15 mg capsule       FLUoxetine (PROZAC) 20 mg capsule TK 1 C PO QD  5    bacitracin zinc (BACITRACIN) ointment Apply  to affected area two (2) times a day. (Patient not taking: Reported on 11/9/2022) 15 g 0     No Known Allergies  Family History   Family history unknown: Yes     Social History     Tobacco Use    Smoking status: Never    Smokeless tobacco: Never   Substance Use Topics    Alcohol use: Yes     Patient Active Problem List   Diagnosis Code    [facility] ([facility] G93.1    H/O traumatic brain injury Z87.820    Abnormal TSH R79.89    Urine frequency R35.0    Ataxia R27.0    Cognitive disorder F09    CP (cerebral palsy) ([facility] G80.9    Insomnia, unspecified G47.00    Major depressive disorder F32.9    Anoxic brain injury ([facility] G93.1    Vertigo R42    Essential hypertension I10    Psychotic disorder ([facility] F29    Sleep disorder G47.9    Syncope and collapse R55       Health Maintenance History  Immunizations reviewed,   colonoscopy: need to be scheduled   Chest CT:N/A   Eye exam:Need to be scheduled           Depression Risk Factor Screening:      Patient Health Questionnaire (PHQ-2)   Over the last 2 weeks, how often have you been bothered by any of the following problems? Little interest or pleasure in doing things? Not at all. [0]  Feeling down, depressed, or hopeless? Not at all. [0]    Total Score: 0/6  PHQ-2 Assessment Scoring:   A score of 2 or more requires further screening with the PHQ-9    Alcohol Risk Factor Screening:     Women:  On any occasion during the past 3 months, have you had more than 3 drinks containing alcohol? No    Do you average more than 7 drinks per week? No   Men: On any occasion during the past 3 months, have you had more than 4 drinks containing alcohol? Do you average more than 14 drinks per week? Functional Ability and Level of Safety:     Hearing Loss    Hearing is good. Activities of Daily Living   Partial assistance. Requires assistance with: dressing and no ADLs    Fall Risk   History of fall(s) in the past 3 months (25 pts)  Secondary diagnoses (15 pts)  Gait weak (10 pts)  Impaired (20 pts)    Abuse Screen   Patient is not abused  None    Examination   Physical Examination  Vitals:    11/09/22 1044   BP: 110/60   Pulse: 62   Resp: 15   Temp: 97.6 °F (36.4 °C)   TempSrc: Temporal   SpO2: 98%   Weight: 161 lb 6.4 oz (73.2 kg)   Height: 5' 11\" (1.803 m)   PainSc:   0 - No pain      Body mass index is 22.51 kg/m².      Evaluation of Cognitive Function:  Mood/affect:Good   Appearance:well dressed   Family member/caregiver input:history was taken from Kuldeep Prabhakar and the patient     alert, well appearing, and in no distress    Patient Care Team:  Damion Ceballos MD as PCP - General (Internal Medicine Physician)  Damion Ceballos MD as PCP - [facility] Empaneled Provider  Tatiana Humphrey DPM (Podiatry)  Scott Ibarra MD (Ophthalmology)  Corliss Runner, MD (Psychiatry)  Amna Wong MD (Urology)  Kemal Carr MD (Neurology)    End-of-life planning  Advanced Directive in the case than an injury or illness causes the patient to be unable to make health care decisions    Health Care Directive or Living Will: yes    Advice/Referrals/Counselling/Plan:   Education and counseling provided:  Are appropriate based on today's review and evaluation  Influenza Vaccine  Prostate cancer screening tests (PSA, covered annually)  Colorectal cancer screening tests  Screening for glaucoma  Include in education list (weight loss, physical activity, smoking cessation, fall prevention, and nutrition)    ICD-10-CM ICD-9-CM    1. Medicare annual wellness visit, subsequent  Z00.00 V70.0       2. Weight loss  R63.4 783.21       3. Essential hypertension  I10 401.9       4. Cerebral palsy, unspecified type ([facility]  G80.9 343.9       5. H/O traumatic brain injury  Z87.820 V15.52       6. Urge incontinence of urine  N39.41 788.31       7. Abnormal blood sugar  R73.09 790.29       8. Need for dental care  Z91.89 V15.89 SF 5000 Plus 1.1 % crea      9. Bilateral impacted cerumen  H61.23 380.4         Encounter Diagnoses   Name Primary? Medicare annual wellness visit, subsequent Yes    Weight loss     Essential hypertension     Cerebral palsy, unspecified type ([facility]     H/O traumatic brain injury     Urge incontinence of urine     Abnormal blood sugar     Need for dental care     Bilateral impacted cerumen      Orders Placed This Encounter    fludrocortisone (FLORINEF) 0.1 mg tablet    Certavite-Antioxidant  mg-mcg tab tablet    SF 5000 Plus 1.1 % crea     Orders Placed This Encounter    Certavite-Antioxidant  mg-mcg tab tablet     Sig: Take 1 Tablet by mouth daily for 364 days. Dispense:  90 Tablet     Refill:  3    SF 5000 Plus 1.1 % crea     Sig: Toothpaste use 2 times per day     Dispense:  51 g     Refill:  3     Orders Placed This Encounter    Certavite-Antioxidant  mg-mcg tab tablet    SF 5000 Plus 1.1 % crea     Diagnoses and all orders for this visit:    1. Medicare annual wellness visit, subsequent    2. Weight loss  Awaiting abdominal ultrasound  Patient need to schedule appointment with gastroenterologist to get colonoscopy    3. Essential hypertension  Blood pressures well controlled on current medications  4. Cerebral palsy, unspecified type ([facility]    5. H/O traumatic brain injury    6. Urge incontinence of urine    7. Abnormal blood sugar    8. Need for dental care  -     SF 5000 Plus 1.1 % crea; Toothpaste use 2 times per day    9.  Bilateral impacted cerumen   [address] will schedule appointment for ear flushing  Other orders  -     Certavite-Antioxidant  mg-mcg tab tablet; Take 1 Tablet by mouth daily for 364 days. Follow-up and Dispositions    Return in about 3 months (around 2/9/2023). current treatment plan is effective, no change in therapy  the following changes in treatment are made: none  lab results and schedule of future lab studies reviewed with patient. Brief written plan, checklist    I have discussed the diagnosis with the patient and the intended plan as seen in the above orders. The patient has received an after-visit summary and questions were answered concerning future plans. I have discussed medication side effects and warnings with the patient as well. I have reviewed the plan of care with the patient, accepted their input and they are in agreement with the treatment goals. Follow-up and Dispositions    Return in about 3 months (around 2/9/2023).            ____________________________________________________________    Problem Assessment    for treatment of   Chief Complaint   Patient presents with    Annual Wellness Visit

## 2022-11-09 NOTE — PROGRESS NOTES
Anai Sanchez is a 47 y.o. male (: 1970) presenting to address:    Chief Complaint   Patient presents with    Annual Wellness Visit       Vitals:    22 1044   BP: 110/60   Pulse: 62   Resp: 15   Temp: 97.6 °F (36.4 °C)   TempSrc: Temporal   SpO2: 98%   Weight: 161 lb 6.4 oz (73.2 kg)   Height: 5' 11\" (1.803 m)   PainSc:   0 - No pain       Hearing/Vision:   No results found. Learning Assessment:     Learning Assessment 9/10/2019   PRIMARY LEARNER Patient   HIGHEST LEVEL OF EDUCATION - PRIMARY LEARNER  -   BARRIERS PRIMARY LEARNER -   CO-LEARNER CAREGIVER -   PRIMARY LANGUAGE ENGLISH   LEARNER PREFERENCE PRIMARY READING     LISTENING     VIDEOS   ANSWERED BY patient   RELATIONSHIP SELF     Depression Screening:     3 most recent PHQ Screens 2022   PHQ Not Done -   Little interest or pleasure in doing things Not at all   Feeling down, depressed, irritable, or hopeless Not at all   Total Score PHQ 2 0     Fall Risk Assessment:     Fall Risk Assessment, last 12 mths 2022   Able to walk? Yes   Fall in past 12 months? 0   Do you feel unsteady? 0   Are you worried about falling 0   Number of falls in past 12 months -   Fall with injury? -     Abuse Screening:     Abuse Screening Questionnaire 2022   Do you ever feel afraid of your partner? N   Are you in a relationship with someone who physically or mentally threatens you? N   Is it safe for you to go home?  Y     ADL Assessment:     ADL Assessment 2021   Feeding yourself No Help Needed   Getting from bed to chair No Help Needed   Getting dressed No Help Needed   Bathing or showering No Help Needed   Walk across the room (includes cane/walker) No Help Needed   Using the telphone Help Needed   Taking your medications Help Needed   Preparing meals Help Needed   Managing money (expenses/bills) Help Needed   Moderately strenuous housework (laundry) Help Needed   Shopping for personal items (toiletries/medicines) Help Needed   Shopping for groceries Help Needed   Driving Help Needed   Climbing a flight of stairs Help Needed   Getting to places beyond walking distances Help Needed        Coordination of Care Questionaire:   1. \"Have you been to the ER, urgent care clinic since your last visit? Hospitalized since your last visit? \" No    2. \"Have you seen or consulted any other health care providers outside of the [address] [place] [address] [place] since your last visit? \"  neurology      3. For patients aged 39-70: Has the patient had a colonoscopy / FIT/ Cologuard? No, pt refuses    If the patient is female:    4. For patients aged 41-77: Has the patient had a mammogram within the past 2 years? NA - based on age or sex  See top three    5. For patients aged 21-65: Has the patient had a pap smear? NA - based on age or sex    Advanced Directive:   1. Do you have an Advanced Directive? YES    2. Would you like information on Advanced Directives?  NO

## 2022-11-15 ENCOUNTER — CLINICAL SUPPORT (OUTPATIENT)
Dept: FAMILY MEDICINE CLINIC | Age: 52
End: 2022-11-15
Payer: MEDICARE

## 2022-11-15 VITALS — TEMPERATURE: 98.3 F

## 2022-11-15 DIAGNOSIS — H61.23 BILATERAL IMPACTED CERUMEN: Primary | ICD-10-CM

## 2022-11-15 PROCEDURE — 69209 REMOVE IMPACTED EAR WAX UNI: CPT | Performed by: LEGAL MEDICINE

## 2022-11-17 ENCOUNTER — TELEPHONE (OUTPATIENT)
Dept: FAMILY MEDICINE CLINIC | Age: 52
End: 2022-11-17

## 2022-11-22 ENCOUNTER — TELEPHONE (OUTPATIENT)
Dept: FAMILY MEDICINE CLINIC | Age: 52
End: 2022-11-22

## 2022-11-22 DIAGNOSIS — R09.81 NASAL CONGESTION: Primary | ICD-10-CM

## 2022-11-22 RX ORDER — SODIUM CHLORIDE 0.65 %
1 AEROSOL, SPRAY (ML) NASAL AS NEEDED
Qty: 30 ML | Refills: 11 | Status: SHIPPED | OUTPATIENT
Start: 2022-11-22 | End: 2022-11-23 | Stop reason: SDUPTHER

## 2022-11-22 NOTE — TELEPHONE ENCOUNTER
Mitch Tinsley from [place] [address]  called to follow up the Prior Auth. Also she said she wants to follow up the previous msges that she left and no one call her back yet. Please call her back. .  CBN: [phone]    Fax #:  [phone]

## 2022-11-22 NOTE — TELEPHONE ENCOUNTER
James Jones 81 called on behalf of the pt stating that the nasal spray(sodium chloride) can not be filled by the pharmacy for 0.05 ml they have no way of measuring the amount. Description or instructions should say however many sprays the provider would think that 0.05 ml would equal up to. Please resend e script with new instructions.

## 2022-11-22 NOTE — TELEPHONE ENCOUNTER
Nasal spray was not sent by urology. I can not resend that medication. Needs to be sent by provider who prescribed the medication.

## 2022-11-23 ENCOUNTER — TELEPHONE (OUTPATIENT)
Dept: FAMILY MEDICINE CLINIC | Age: 52
End: 2022-11-23

## 2022-11-23 DIAGNOSIS — R09.81 NASAL CONGESTION: ICD-10-CM

## 2022-11-23 RX ORDER — SODIUM CHLORIDE 0.65 %
AEROSOL, SPRAY (ML) NASAL
Qty: 50 ML | Refills: 5 | Status: SHIPPED | OUTPATIENT
Start: 2022-11-23

## 2022-11-23 NOTE — TELEPHONE ENCOUNTER
James Jones 81 called about the nasal spray again. The did receieve the new script from lasy night however the instructions are still not what they are needing. They are requesting the 0.05ml and the \"up to 30 days\" to be taken out of the instructions. The are wanting it to say more like \"Take 1 spray in both nostrils route as needed for congestion\" Please advise.

## 2022-11-29 ENCOUNTER — TELEPHONE (OUTPATIENT)
Dept: FAMILY MEDICINE CLINIC | Age: 52
End: 2022-11-29

## 2022-11-29 DIAGNOSIS — J06.9 URTI (ACUTE UPPER RESPIRATORY INFECTION): ICD-10-CM

## 2022-11-29 DIAGNOSIS — R05.9 COUGH: ICD-10-CM

## 2022-11-29 RX ORDER — ACETAMINOPHEN 500 MG
500 TABLET ORAL
Qty: 30 TABLET | Refills: 2 | Status: SHIPPED | OUTPATIENT
Start: 2022-11-29

## 2022-11-29 RX ORDER — GUAIFENESIN 600 MG/1
600 TABLET, EXTENDED RELEASE ORAL 2 TIMES DAILY
Qty: 20 TABLET | Refills: 5 | Status: SHIPPED | OUTPATIENT
Start: 2022-11-29

## 2022-12-14 ENCOUNTER — TELEPHONE (OUTPATIENT)
Dept: FAMILY MEDICINE CLINIC | Age: 52
End: 2022-12-14

## 2022-12-14 NOTE — TELEPHONE ENCOUNTER
----- Message from Michell Rajwinder sent at 12/13/2022  1:43 PM EST -----  Subject: Appointment Request    Reason for Call: Established Patient Appointment needed: Routine ED Follow   Up Visit    QUESTIONS    Reason for appointment request? Available appointments did not meet   patient need     Additional Information for Provider? Patient seen in [address] on   12/13/22 with dizziness and fell. needs a follow up for 3-5 days Also had   questions about the Tylenol script sent home with patient ER wrote for   650mg Tylenol every 4-6 but need specific directions either 4 or 6 no   range.  Would need a new script for this   ---------------------------------------------------------------------------  --------------  Sushil SERRANO  [phone]; OK to leave message on voicemail  ---------------------------------------------------------------------------  --------------  SCRIPT ANSWERS  SHELIAID Screen: Fatemeh Patel

## 2022-12-14 NOTE — TELEPHONE ENCOUNTER
Pt went  to [facility] [place] [facility] for fall on 12/13/22; pt was given Tylenol 650 mg to take as needed for pain; pt already has a Rx for Tylenol 500 mg; the staff needs clarification if it is safe to give pt the higher dose.

## 2022-12-14 NOTE — TELEPHONE ENCOUNTER
Returning a call to [facility] staff. I informed her that he already had an appt on 1/3 for an appt and that was the soonest that she had. She also had questions about the tylenol script that the ER gave him.  I transferred call to Mildred Unger to try to assist.

## 2022-12-15 ENCOUNTER — TELEPHONE (OUTPATIENT)
Dept: FAMILY MEDICINE CLINIC | Age: 52
End: 2022-12-15

## 2022-12-15 DIAGNOSIS — I95.1 ORTHOSTATIC HYPOTENSION: Primary | ICD-10-CM

## 2022-12-15 DIAGNOSIS — Z91.81 AT RISK FOR FALLING: ICD-10-CM

## 2022-12-15 DIAGNOSIS — G93.1 ANOXIC BRAIN DAMAGE (HCC): ICD-10-CM

## 2022-12-15 NOTE — TELEPHONE ENCOUNTER
Referral has been generated to cardiology as requested, as far as the prescription for Tylenol they need to send me the order that I need to sign

## 2022-12-15 NOTE — TELEPHONE ENCOUNTER
Joshua  called on behlaf of the pt stating that she had forgot to ask if Dr Andrade Damon could fax over something stating that the pt is taking  500 mg Tylenol for pain.  Document can be faxed to [phone]

## 2022-12-15 NOTE — TELEPHONE ENCOUNTER
Informed pt's , Bita Hernández, pt is to take Tylenol 500 mg PRN for pain, not the Tylenol 650 mg Rx's at hospital.

## 2022-12-15 NOTE — TELEPHONE ENCOUNTER
Ms Svetlana Osei from [facility] called to request a referral for the patient to a Cardiologist. They fell like he needs to be seen them for his orthostatic hypotension and him falling a lot. She gave the number to the patient to call w/ the info.  Please advise

## 2022-12-28 DIAGNOSIS — G80.9 CEREBRAL PALSY, UNSPECIFIED TYPE (HCC): ICD-10-CM

## 2022-12-28 DIAGNOSIS — R11.2 NAUSEA AND VOMITING, UNSPECIFIED VOMITING TYPE: ICD-10-CM

## 2022-12-28 DIAGNOSIS — Z78.9 POOR HISTORIAN: ICD-10-CM

## 2022-12-28 DIAGNOSIS — R63.4 WEIGHT LOSS: ICD-10-CM

## 2022-12-28 DIAGNOSIS — G93.1 ANOXIC BRAIN DAMAGE (HCC): ICD-10-CM

## 2023-01-03 ENCOUNTER — OFFICE VISIT (OUTPATIENT)
Dept: FAMILY MEDICINE CLINIC | Age: 53
End: 2023-01-03
Payer: MEDICARE

## 2023-01-03 VITALS
HEART RATE: 84 BPM | DIASTOLIC BLOOD PRESSURE: 76 MMHG | BODY MASS INDEX: 22.82 KG/M2 | RESPIRATION RATE: 14 BRPM | TEMPERATURE: 97.4 F | HEIGHT: 71 IN | SYSTOLIC BLOOD PRESSURE: 110 MMHG | OXYGEN SATURATION: 98 % | WEIGHT: 163 LBS

## 2023-01-03 DIAGNOSIS — I95.1 ORTHOSTATIC HYPOTENSION: ICD-10-CM

## 2023-01-03 DIAGNOSIS — C61 PROSTATE CANCER (HCC): Primary | ICD-10-CM

## 2023-01-03 DIAGNOSIS — N31.9 NEUROGENIC BLADDER: ICD-10-CM

## 2023-01-03 DIAGNOSIS — G80.9 CEREBRAL PALSY, UNSPECIFIED TYPE (HCC): ICD-10-CM

## 2023-01-03 DIAGNOSIS — N39.41 URGE INCONTINENCE OF URINE: ICD-10-CM

## 2023-01-03 DIAGNOSIS — G93.1 ANOXIC BRAIN DAMAGE (HCC): ICD-10-CM

## 2023-01-03 DIAGNOSIS — R26.81 UNSTEADY GAIT WHEN WALKING: ICD-10-CM

## 2023-01-03 DIAGNOSIS — F29 PSYCHOSIS, UNSPECIFIED PSYCHOSIS TYPE (HCC): ICD-10-CM

## 2023-01-03 DIAGNOSIS — R63.4 WEIGHT LOSS: ICD-10-CM

## 2023-01-03 DIAGNOSIS — Z91.81 AT RISK FOR FALLING: ICD-10-CM

## 2023-01-03 DIAGNOSIS — F09 COGNITIVE DISORDER: ICD-10-CM

## 2023-01-03 PROCEDURE — G9717 DOC PT DX DEP/BP F/U NT REQ: HCPCS | Performed by: LEGAL MEDICINE

## 2023-01-03 PROCEDURE — 3074F SYST BP LT 130 MM HG: CPT | Performed by: LEGAL MEDICINE

## 2023-01-03 PROCEDURE — 3078F DIAST BP <80 MM HG: CPT | Performed by: LEGAL MEDICINE

## 2023-01-03 PROCEDURE — 99214 OFFICE O/P EST MOD 30 MIN: CPT | Performed by: LEGAL MEDICINE

## 2023-01-03 PROCEDURE — G8427 DOCREV CUR MEDS BY ELIG CLIN: HCPCS | Performed by: LEGAL MEDICINE

## 2023-01-03 PROCEDURE — G8420 CALC BMI NORM PARAMETERS: HCPCS | Performed by: LEGAL MEDICINE

## 2023-01-03 PROCEDURE — 3017F COLORECTAL CA SCREEN DOC REV: CPT | Performed by: LEGAL MEDICINE

## 2023-01-03 RX ORDER — OMEPRAZOLE 40 MG/1
40 CAPSULE, DELAYED RELEASE ORAL DAILY
COMMUNITY
Start: 2022-12-13

## 2023-01-03 NOTE — PROGRESS NOTES
[address]hy[address]     Chief Complaint   Patient presents with    Weight Management    Follow-up     Patient passed out and fell 12/13/2022     Vitals:    01/03/23 1017   BP: 110/76   Pulse: 84   Resp: 14   Temp: 97.4 °F (36.3 °C)   TempSrc: Temporal   SpO2: 98%   Weight: 163 lb (73.9 kg)   Height: 5' 11\" (1.803 m)   PainSc:   0 - No pain         HPI: Juan Fall is here for follow up on weight loss . He fell and went to the ER 12/13/22 , he was found by the staff after he fell with swelling of right temple   He has appointment with cardiology on 1/24 to rule out any arrhythmias   ER note was reviewed   No weight loss since last visit   Normal appetite   Had seen Gastroenterology he os on omeprazole , on feb 23 he has appointment for upper GI and endoscopy     Patient is accompanied by one of the employer where he lives at, requesting for adult diapers, urinal , toilet seat riser to elevate his toilet seat ,to help with his daily living    He will need evaluation by home care for home safety    Past Medical History:   Diagnosis Date    Brain anoxic injury ([facility]     Depression 02/01/2005    recurrent episode, Major Depression    Psychotic disorder ([facility] [address] 12/07/2007    Intellectual disability     Vertigo 03/17/2018     Past Surgical History:   Procedure Laterality Date    HX TRACHEOSTOMY N/A 1985    HX UROLOGICAL  05/12/2022    CYSTOSCOPY; TRANSPERINEAL PROSTATE BIOPSY; Dr. Marcus Cutler History     Tobacco Use    Smoking status: Never    Smokeless tobacco: Never   Substance Use Topics    Alcohol use: Yes       Family History   Family history unknown: Yes       Review of Systems   Constitutional:  Negative for chills, diaphoresis, fever, malaise/fatigue and weight loss. HENT:  Negative for congestion, ear discharge, ear pain, hearing loss, nosebleeds, sinus pain and sore throat. Eyes:  Negative for blurred vision, double vision and discharge.    Respiratory:  Negative for cough, hemoptysis, sputum production, shortness of breath, wheezing and stridor. Cardiovascular:  Negative for chest pain, palpitations, claudication and leg swelling. Gastrointestinal:  Negative for abdominal pain, constipation, diarrhea, nausea and vomiting. Genitourinary:  Negative for dysuria, flank pain, frequency, hematuria and urgency. Musculoskeletal:  Positive for falls. Negative for back pain, joint pain, myalgias and neck pain. Skin:  Negative for itching and rash. Neurological:  Positive for dizziness and loss of consciousness. Negative for tingling, sensory change, speech change, focal weakness, seizures, weakness and headaches. Physical Exam  Vitals and nursing note reviewed. Constitutional:       General: He is not in acute distress. Appearance: Normal appearance. He is well-developed. He is not ill-appearing, toxic-appearing or diaphoretic. HENT:      Head: Normocephalic and atraumatic. Eyes:      General: No scleral icterus. Neck:      Thyroid: No thyromegaly. Cardiovascular:      Rate and Rhythm: Regular rhythm. Tachycardia present. Pulses: Normal pulses. Heart sounds: Normal heart sounds. Pulmonary:      Effort: Pulmonary effort is normal. No respiratory distress. Breath sounds: Normal breath sounds. No rales. Abdominal:      General: There is no distension. Palpations: Abdomen is soft. There is no mass. Tenderness: There is no abdominal tenderness. There is no rebound. Musculoskeletal:         General: No tenderness or deformity. Normal range of motion. Cervical back: Normal range of motion. No rigidity or tenderness. Right lower leg: No edema. Left lower leg: No edema. Lymphadenopathy:      Cervical: No cervical adenopathy. Skin:     General: Skin is warm and dry. Findings: No erythema or rash. Neurological:      Mental Status: He is alert and oriented to person, place, and time. Cranial Nerves: No cranial nerve deficit.       Coordination: Coordination normal.      Gait: Gait normal.   Psychiatric:      Comments: At baseline        Assessment and plan     Plan of care has been discussed with the patient, he agrees to the plan and verbalized understanding. All his questions were answered  More than 50% of the time spent in this visit was counseling the patient about  illness and treatment options         1. Prostate cancer [facility])  He does follow-up with urologist was last seen on November 22    ASSESSMENT:     1. cT1c [place] 3+3 CaP (1/12 cores), Vol 32, PSA 4.67. dx 5/12/22              MRI pelvis 8/12/21: [facility] 2. Prostate volume 46.1cc               Most recent PSA was 4.67 ng/mL on 3/8/22. Prior PSA was 4.7 on 3/10/21     2. Neurogenic bladder 2/2 toTBI. Notes Urinary Frequency/Urinary Urgency               Benefit from [address] but had to d/c due to nausea and vomiting. Currently on Myrbetriq 50 mg daily with no benefit. 3. Hx TBI, polydipsia, orthostatic hypotension. No obvious DI on BMP          2. Psychosis, unspecified psychosis type ([address]  Stable with no acute changes    3. Cerebral palsy, unspecified type [facility])  He does follow-up with neurologist will get latest note from neurology and discuss with her further management and collaboration  - AMB SUPPLY ORDER  - REFERRAL TO HOME HEALTH    4. Anoxic brain damage (HCC)    - AMB SUPPLY ORDER  - REFERRAL TO HOME HEALTH    5. Cognitive disorder      6. At risk for falling  Need home health evaluation for home safety  - AMB SUPPLY ORDER  - AMB SUPPLY ORDER  - REFERRAL TO HOME HEALTH    7. Orthostatic hypotension    - AMB SUPPLY ORDER  - AMB SUPPLY ORDER  - AMB SUPPLY ORDER  - REFERRAL TO HOME HEALTH    8. Unsteady gait when walking    - AMB SUPPLY ORDER  - AMB SUPPLY ORDER  - AMB SUPPLY ORDER  - REFERRAL TO HOME HEALTH    9.  Urge Incontinence of urine  Patient has severe urge incontinence and very frequent neurological bladder  Is very difficult for him to reach the bathroom before soiling himself  Also has unsteady gait dizziness and orthostatic hypotension  - AMB SUPPLY ORDER  - AMB SUPPLY ORDER  - REFERRAL TO HOME HEALTH    10. Neurogenic bladder    - AMB SUPPLY ORDER  - AMB SUPPLY ORDER  - REFERRAL TO HOME HEALTH    11. Weight loss  His weight has been stable with no loss since last visit  Current Outpatient Medications   Medication Sig Dispense Refill    omeprazole (PRILOSEC) 40 mg capsule       guaiFENesin ER (MUCINEX) 600 mg ER tablet Take 1 Tablet by mouth two (2) times a day. As needed for cough 20 Tablet 5    acetaminophen (TYLENOL) 500 mg tablet Take 1 Tablet by mouth every six (6) hours as needed for Pain. And for fever or headaches  Indications: pain 30 Tablet 2    sodium chloride (Ocean Nasal) 0.65 % nasal squeeze bottle Take 1 spray in both nostrils route as needed for congestion 50 mL 5    fesoterodine (TOVIAZ) 4 mg SR tablet Take 1 Tablet by mouth daily. 90 Tablet 3    fludrocortisone (FLORINEF) 0.1 mg tablet       Certavite-Antioxidant  mg-mcg tab tablet Take 1 Tablet by mouth daily for 364 days. 90 Tablet 3    SF 5000 Plus 1.1 % crea Toothpaste use 2 times per day 51 g 3    sodium chloride (OCEAN) 0.65 % nasal squeeze bottle 0.05 mL by Both Nostrils route two (2) times daily as needed for Congestion for up to 30 days. 104 mL 11    fluticasone propionate (FLONASE) 50 mcg/actuation nasal spray 2 Sprays by Both Nostrils route daily. 1 Each 5    temazepam (RESTORIL) 15 mg capsule       FLUoxetine (PROZAC) 20 mg capsule TK 1 C PO QD  5    ARIPiprazole (ABILIFY) 5 mg tablet 2 mg daily.          Patient Active Problem List    Diagnosis Date Noted    Prostate cancer ([facility] [address] 01/03/2023    Abnormal TSH 09/10/2019    Urine frequency 09/10/2019    Anoxic brain damage ([facility] [address] 06/13/2018    H/O traumatic brain injury 06/13/2018    Ataxia 03/15/2018    Anoxic brain injury ([facility] [address] 03/15/2018    Vertigo 03/15/2018    Cognitive disorder 04/13/2016    Insomnia, unspecified 04/13/2016 Major depressive disorder 04/13/2016    CP (cerebral palsy) ([facility] [address] 12/01/2011    Essential hypertension 01/01/1901    Psychotic disorder ([facility] [address] 01/01/1901    Sleep disorder 01/01/1901    Syncope and collapse 01/01/1901     Results for orders placed or performed in visit on 11/11/22   AMB POC PVR, SID,POST-VOID RES,US,NON-IMAGING   Result Value Ref Range    PVR 22 cc     Office Visit on 11/11/2022   Component Date Value Ref Range Status    PVR 11/11/2022 22  cc Final   Hospital Outpatient Visit on 10/04/2022   Component Date Value Ref Range Status    Sodium 10/04/2022 137  136 - 145 mmol/L Final    Potassium 10/04/2022 4.5  3.5 - 5.5 mmol/L Final    Chloride 10/04/2022 103  100 - 111 mmol/L Final    CO2 10/04/2022 31  21 - 32 mmol/L Final    Anion gap 10/04/2022 3  3.0 - 18 mmol/L Final    Glucose 10/04/2022 89  74 - 99 mg/dL Final    BUN 10/04/2022 15  7.0 - 18 MG/DL Final    Creatinine 10/04/2022 0.98  0.6 - 1.3 MG/DL Final    BUN/Creatinine ratio 10/04/2022 15  12 - 20   Final    eGFR 10/04/2022 >60  >60 ml/min/1.73m2 Final    Comment:      Pediatric calculator link: Gavin.at. org/professionals/kdoqi/gfr_calculatorped       Effective Oct 3, 2022       These results are not intended for use in patients <25years of age. eGFR results are calculated without a race factor using  the 2021 CKD-EPI equation. Careful clinical correlation is recommended, particularly when comparing to results calculated using previous equations. The CKD-EPI equation is less accurate in patients with extremes of muscle mass, extra-renal metabolism of creatinine, excessive creatine ingestion, or following therapy that affects renal tubular secretion. Calcium 10/04/2022 9.3  8.5 - 10.1 MG/DL Final    Bilirubin, total 10/04/2022 0.5  0.2 - 1.0 MG/DL Final    ALT (SGPT) 10/04/2022 19  16 - 61 U/L Final    AST (SGOT) 10/04/2022 13  10 - 38 U/L Final    Alk.  phosphatase 10/04/2022 67  45 - 117 U/L Final    Protein, total 10/04/2022 7.2  6.4 - 8.2 g/dL Final    Albumin 10/04/2022 3.8  3.4 - 5.0 g/dL Final    Globulin 10/04/2022 3.4  2.0 - 4.0 g/dL Final    A-G Ratio 10/04/2022 1.1  0.8 - 1.7   Final    WBC 10/04/2022 8.5  4.6 - 13.2 K/uL Final    RBC 10/04/2022 4.70  4.35 - 5.65 M/uL Final    HGB 10/04/2022 14.3  13.0 - 16.0 g/dL Final    HCT 10/04/2022 43.2  36.0 - 48.0 % Final    MCV 10/04/2022 91.9  78.0 - 100.0 FL Final    MCH 10/04/2022 30.4  24.0 - 34.0 PG Final    MCHC 10/04/2022 33.1  31.0 - 37.0 g/dL Final    RDW 10/04/2022 13.1  11.6 - 14.5 % Final    PLATELET 95/80/0419 979  135 - 420 K/uL Final    MPV 10/04/2022 9.5  9.2 - 11.8 FL Final    NRBC 10/04/2022 0.0  0  WBC Final    ABSOLUTE NRBC 10/04/2022 0.00  0.00 - 0.01 K/uL Final    NEUTROPHILS 10/04/2022 68  40 - 73 % Final    LYMPHOCYTES 10/04/2022 23  21 - 52 % Final    MONOCYTES 10/04/2022 8  3 - 10 % Final    EOSINOPHILS 10/04/2022 1  0 - 5 % Final    BASOPHILS 10/04/2022 0  0 - 2 % Final    IMMATURE GRANULOCYTES 10/04/2022 0  0.0 - 0.5 % Final    ABS. NEUTROPHILS 10/04/2022 5.8  1.8 - 8.0 K/UL Final    ABS. LYMPHOCYTES 10/04/2022 2.0  0.9 - 3.6 K/UL Final    ABS. MONOCYTES 10/04/2022 0.6  0.05 - 1.2 K/UL Final    ABS. EOSINOPHILS 10/04/2022 0.1  0.0 - 0.4 K/UL Final    ABS. BASOPHILS 10/04/2022 0.0  0.0 - 0.1 K/UL Final    ABS. IMM. GRANS.  10/04/2022 0.0  0.00 - 0.04 K/UL Final    DF 10/04/2022 AUTOMATED    Final    TSH 10/04/2022 3.14  0.36 - 3.74 uIU/mL Final    T4, Free 10/04/2022 1.0  0.7 - 1.5 NG/DL Final

## 2023-01-03 NOTE — PROGRESS NOTES
Presley Pratt is a 47 y.o. male (: 1970) presenting to address:    Chief Complaint   Patient presents with    Weight Management    Follow-up     Patient passed out and fell 2022       Vitals:    23 1017   BP: 110/76   Pulse: 84   Resp: 14   Temp: 97.4 °F (36.3 °C)   TempSrc: Temporal   SpO2: 98%   Weight: 163 lb (73.9 kg)   Height: 5' 11\" (1.803 m)   PainSc:   0 - No pain       Hearing/Vision:   No results found. Learning Assessment:     Learning Assessment 9/10/2019   PRIMARY LEARNER Patient   HIGHEST LEVEL OF EDUCATION - PRIMARY LEARNER  -   BARRIERS PRIMARY LEARNER -   CO-LEARNER CAREGIVER -   PRIMARY LANGUAGE ENGLISH   LEARNER PREFERENCE PRIMARY READING     LISTENING     VIDEOS   ANSWERED BY patient   RELATIONSHIP SELF     Depression Screening:     3 most recent PHQ Screens 1/3/2023   PHQ Not Done -   Little interest or pleasure in doing things Not at all   Feeling down, depressed, irritable, or hopeless Not at all   Total Score PHQ 2 0     Fall Risk Assessment:     Fall Risk Assessment, last 12 mths 2022   Able to walk? Yes   Fall in past 12 months? 1   Do you feel unsteady? 1   Are you worried about falling 1   Is the gait abnormal? 1   Number of falls in past 12 months -   Fall with injury? 0     Abuse Screening:     Abuse Screening Questionnaire 2022   Do you ever feel afraid of your partner? N   Are you in a relationship with someone who physically or mentally threatens you? N   Is it safe for you to go home?  Y     ADL Assessment:     ADL Assessment 2022   Feeding yourself No Help Needed   Getting from bed to chair No Help Needed   Getting dressed Help Needed   Bathing or showering No Help Needed   Walk across the room (includes cane/walker) No Help Needed   Using the telphone Help Needed   Taking your medications Help Needed   Preparing meals Help Needed   Managing money (expenses/bills) Help Needed   Moderately strenuous housework (laundry) Help Needed   Shopping for personal items (toiletries/medicines) Help Needed   Shopping for groceries Help Needed   Driving Help Needed   Climbing a flight of stairs Help Needed   Getting to places beyond walking distances Help Needed        Coordination of Care Questionaire:   1. \"Have you been to the ER, urgent care clinic since your last visit? Hospitalized since your last visit? \" Yes When: 12/13/2022 Where: [facility]  Reason for visit: passed out    2. \"Have you seen or consulted any other health care providers outside of the [facility] [place] [address] [place] since your last visit? \" Yes When: 2022 Where: GI Reason for visit: follow up      3. For patients aged 39-70: Has the patient had a colonoscopy? No Patient is scheduled for 2/23/23    If the patient is female:    4. For patients aged 41-77: Has the patient had a mammogram within the past 2 years? NA - based on age    11. For patients aged 21-65: Has the patient had a pap smear? No    Advanced Directive:   1. Do you have an Advanced Directive? YES    2. Would you like information on Advanced Directives?  NO

## 2023-01-04 ENCOUNTER — TELEPHONE (OUTPATIENT)
Dept: FAMILY MEDICINE CLINIC | Age: 53
End: 2023-01-04

## 2023-01-05 ENCOUNTER — TELEPHONE (OUTPATIENT)
Dept: FAMILY MEDICINE CLINIC | Age: 53
End: 2023-01-05

## 2023-01-05 ENCOUNTER — HOME HEALTH ADMISSION (OUTPATIENT)
Dept: HOME HEALTH SERVICES | Facility: HOME HEALTH | Age: 53
End: 2023-01-05
Payer: MEDICARE

## 2023-01-05 NOTE — TELEPHONE ENCOUNTER
----- Message from Helenximena Allie sent at 2023 12:08 PM EST -----  [address] afternoon Dr. Bryan Dolan, thank you for sending a home health referral for Mr. uLz Reza. Kindly add SN PT to the home health orders. We look forward to service [facility] health needs.     Sundar Lopez RN  [facility]  [phone]

## 2023-01-06 ENCOUNTER — HOME CARE VISIT (OUTPATIENT)
Dept: HOME HEALTH SERVICES | Facility: HOME HEALTH | Age: 53
End: 2023-01-06

## 2023-01-06 ENCOUNTER — HOME CARE VISIT (OUTPATIENT)
Dept: SCHEDULING | Facility: HOME HEALTH | Age: 53
End: 2023-01-06
Payer: MEDICARE

## 2023-01-06 VITALS
TEMPERATURE: 97 F | OXYGEN SATURATION: 97 % | HEART RATE: 97 BPM | SYSTOLIC BLOOD PRESSURE: 123 MMHG | DIASTOLIC BLOOD PRESSURE: 68 MMHG

## 2023-01-06 PROCEDURE — G0299 HHS/HOSPICE OF RN EA 15 MIN: HCPCS

## 2023-01-06 NOTE — HOME HEALTH
Skilled services/Home bound verification:     Skilled Reason for admission/summary of clinical condition:Psychosis, unspecified psychosis type ([facility] [address] ,Cerebral palsy, unspecified type ([facility] [address] Cognitive  disorder,At risk for falling . Mayi Burgos case/clinical manager present to as historian d/t mental disabilities. reported that doesnt need nursing. Staff at the residential group home manages disease and medications. needs therapy d/t multiple falls and needs reccomendations in home to prevent falls and reported that has balance/ gait abnormalities that resulted in witnessned and unwitnessed falls. This patient is homebound for the following reasons Requires the assistance of 1 or more persons to leave the home  and Decreased mental status requiring 24 hour supervision . Caregiver: group home staff. Caregiver assists with ADLS/IADLS/ MEDICATIONS AND MEAL PREP/TRANSPORTATION THROUGHT THE [facility]. Medications reconciled and all medications are available in the home this visit. The following education was provided regarding medications:  reviewed all medication bottles in the home for purpose, side effects and frequency. verbalized understanding    Medications  are effective at this time. High risk medication teaching regarding anticoagulants, hyperglycemic agents or opiod narcotics performed (specify) N/A,        Home health supplies by type and quantity ordered/delivered this visit include: N/A    Patient education provided this visit to include: Reviewed fall precautions and safety:dangle, uses assistive device at all times, non skid foot wear,and night light. Verbalized understanding. reviewed HTN: to notify home health/provider for: Feeling faint, dizzy, confused, or drowsy; continually high blood pressure readings despite taking medications; questions or concerns about condition or care.  Notify emergency services for signs of a heart attack: Squeezing, pressure, or pain in chest; discomfort or pain in back, neck, jaw, stomach, or arm; shortness of breath; nausea or vomiting; lightheadedness or a sudden cold sweat. Notify emergency services for signs of a stroke: Numbness or drooping on one side of face, weakness in an arm or leg, confusion or difficulty speaking, dizziniess, severe headache, or vision loss. NARENDRA reported that HTN is controlled. reported that refuses to use any assistive device. Patient level of understanding of education provided: verbalized understanding and repeat back      Sharps Education Provided: N/A  Patient response to procedure performed:  N/A    Home exercise program/Homework provided: Group home will cont to administer medications, take to medical appointments. Pt/Caregiver instructed on plan of care and are agreeable to plan of care at this time. Physician Dr Mauricio Bolaños  notified of patient admission to home health and plan of care including anticipated frequency of 1 week 1and treatments/interventions/modalities of Psychosis, unspecified psychosis type ([address] ,Cerebral palsy, unspecified type ([address] Cognitive  disorder,At risk for falling. Discharge planning discussed with patient and caregiver. Discharge planning as follows: nursing eval only. Pt/Caregiver did verbalize understanding of discharge planning. Next MD appointment 1/24/2023 (date) with Melonie Ferrell MD  Patient/caregiver encouraged/instructed to keep appointment as lack of follow through with physician appointment could result in discontinuation of home care services for non-compliance. narendra called back after visit to report that has an advance directive. will provide copy to therapy.

## 2023-01-06 NOTE — Clinical Note
Nursing eval only. CG reported that  PT/OT is needed to help prevent falls. requesting OT eval and treat. thank you.

## 2023-01-07 DIAGNOSIS — R26.81 UNSTEADY GAIT WHEN WALKING: ICD-10-CM

## 2023-01-07 DIAGNOSIS — Z91.81 AT RISK FOR FALLING: ICD-10-CM

## 2023-01-07 DIAGNOSIS — G80.9 CEREBRAL PALSY, UNSPECIFIED TYPE (HCC): Primary | ICD-10-CM

## 2023-01-08 ENCOUNTER — HOME CARE VISIT (OUTPATIENT)
Dept: SCHEDULING | Facility: HOME HEALTH | Age: 53
End: 2023-01-08
Payer: MEDICARE

## 2023-01-08 VITALS
TEMPERATURE: 98.4 F | HEART RATE: 88 BPM | DIASTOLIC BLOOD PRESSURE: 70 MMHG | SYSTOLIC BLOOD PRESSURE: 118 MMHG | RESPIRATION RATE: 16 BRPM | OXYGEN SATURATION: 97 %

## 2023-01-08 PROCEDURE — G0151 HHCP-SERV OF PT,EA 15 MIN: HCPCS

## 2023-01-08 NOTE — HOME HEALTH
Pt is referred to HHPT by his PCP due to recent falls  PMHx: Prostate cancer ([facility] [address]    Psychosis, unspecified psychosis type ([facility] [address]    Cerebral palsy, unspecified type ([facility] [address]    Anoxic brain damage ([facility] [address]    Cognitive  disorder    At risk for falling    Orthostatic hypotension    Unsteady gait when walking    Urge incontinence of urine    Neurogenic bladder    Weight loss     SUBJECTIVE: \"I don't know why I've been falling\"  LIVING SITUATION/PLOF: Pt lives in a supported residential living with one other person. It is a first floor apt. Staff is present 7AM - 9PM. Pt goes to supported work program MWF and day program T/Th. PTA pt was I with mobility w/o AD  CAREGIVER INVOLVEMENT/ ASSISTANCE NEEDED FOR: Transportation, financial management, meal prep, med management, household chores  [address]: Meds reviewed with no changes. NEXT MD APPT: 1/24 with Dr Gasper Seip    ROM: Decreased hip flexion and extension, decreased knee extension. Influence of LE extensor tone noted. Pt has scoliosis with R concave curve  STRENGTH: B hips grossly 3+/5, knees and ankles 5/5  WOUNDS: No wounds  BED MOBILITY: I supine<>sit and rolling L/R  TRANSFERS: I sit<>stand from varied surfaces in the home  GAIT: Pt amb with flexed hips and knees. Trunk leans to R. No AD used, pt needs S for safe amb indoors and outdoors on uneven surface  STAIRS: CG up/down 3 steps with rail outside   BALANCE: GUTIERREZ score 33/56 = moderate fall risk    PATIENT EDUCATION PROVIDED THIS VISIT: safety, HEP, walking, deep breathing    PATIENT RESPONSE TO EDUCATION PROVIDED:Pt verbalizes understanding of all information and demo's back as appropriate  PATIENT RESPONSE TO EVALUATION/TREATMENT: Pt demo'd positive response to PT shown by full participation w/o pain and with stable vitals    HEP consisting of:  1. Walking every hour during the day with S  Patient/caregiver verbalized understanding but will need reinforcement for safety. [facility] Bound ASSESSMENT AND CONTINUED NEED FOR THE FOLLOWING SKILLS: Pt with frequent falls presents with impaired balance, decreased strength and influence of extensor tone. HHPT is medically necessary in order to improve functional mobility/quality of life, decrease burden of care, reduce risk for re-hospitalization, work towards patient's personal goals of return to PLOF w decrease risk for falls. Goals established for increased independence in the home, safe mobility in the home, improvement in strength and ROM - all designed to reduce fall risk and progress toward independence. Patient will benefit from continued PT intervention to progress toward meeting all established goals     PLAN: PT 3W1 2W1. Pt will be seen to establish and upgrade HEP, gait training on flat and uneven surfaces, stair training, dynamic standing balance re -education. Reinforece general safety precautions. DISCHARGE PLANNING DISCUSSED: Discharge to self and family under MD supervision once all goals have been met or patient has reached max potential. Pt plans to resume outpt PT after home care discharge.

## 2023-01-08 NOTE — PROGRESS NOTES
Referral generated     DAV Darden MD  Requesting an order for Occupational Therapy evaluation.  Please fax order to [phone]

## 2023-01-11 ENCOUNTER — TELEPHONE (OUTPATIENT)
Dept: FAMILY MEDICINE CLINIC | Age: 53
End: 2023-01-11

## 2023-01-11 ENCOUNTER — HOME CARE VISIT (OUTPATIENT)
Dept: SCHEDULING | Facility: HOME HEALTH | Age: 53
End: 2023-01-11
Payer: MEDICARE

## 2023-01-11 ENCOUNTER — HOME CARE VISIT (OUTPATIENT)
Dept: HOME HEALTH SERVICES | Facility: HOME HEALTH | Age: 53
End: 2023-01-11
Payer: MEDICARE

## 2023-01-11 VITALS
TEMPERATURE: 98 F | HEART RATE: 85 BPM | RESPIRATION RATE: 16 BRPM | DIASTOLIC BLOOD PRESSURE: 79 MMHG | OXYGEN SATURATION: 98 % | SYSTOLIC BLOOD PRESSURE: 124 MMHG

## 2023-01-11 PROCEDURE — G0157 HHC PT ASSISTANT EA 15: HCPCS

## 2023-01-11 NOTE — TELEPHONE ENCOUNTER
Received call from [facility] requesting order clarification for pt; the order is for physical therapy.

## 2023-01-11 NOTE — HOME HEALTH
SUBJECTIVE: Patient felt good today and had no c/o pain. CAREGIVER INVOLVEMENT/ASSISTANCE NEEDED FOR: HRT assists with transportation. HOME HEALTH SUPPLIES BY TYPE AND QUANTITY ORDERED/DELIVERED THIS VISIT INCLUDE: None needed for this visit. OBJECTIVE:  See interventions. PATIENT RESPONSE TO TREATMENT: Pt felt good after waling and exercising. PATIENT LEVEL OF UNDERSTANDING OF EDUCATION PROVIDED: Patient verbalized understanding on pain management techniques and fall prevention. ASSESSMENT OF PROGRESS TOWARD GOALS: Patient is progressing toward goals for gait and stairs. Pt continues to need Supervision to ambulate safely but progressed to walking longer distance outside today over uneven surfaces. Patient ambulated with good pace but with decreased foot clearance. Gave multiple vc's for pt to lift B foot higher to clear floor safely. Pt needs reinforcement for safety with gait and to increase foot clearance. Pt also previously needed CGA to go up and down stairs safely but progressed SBA for stairs training today. Pt alternated between R LE and L LE to go up and down stairs. He demonstrated good stability with stairs negotiation using handrail. In addition, instructed pt on standing therapeutic exercises and to comply with HEP. Pt has poor memory and would need assistance to complete HEP. Moreover, patient demonstrated good ability and body control to get up from the floor with Supervision. He slowly got on the ground by himself safely. Discussed with patient/CG plan to discharge pt from [facility] PT services next week. Pt/CG verbalized understanding and is in agreement with discharge plan. CONTINUED NEED FOR THE FOLLOWING SKILLS: Gait Training, Stairs Training, Transfer Training, [employer], Balance Training, and Therapeutic Exercises. PLAN FOR NEXT VISIT: Patient will be seen 1w1 2w1 to increase safety with gait, to improve transfer technique, and to increase strength of B LE.   DISCHARGE PLANNING DISCUSSED WITH PATIENT/CAREGIVER: Discharge patient to family with MD supervision when all goals are met. Pt/Caregiver did verbalize understanding of discharge planning.

## 2023-01-13 ENCOUNTER — HOME CARE VISIT (OUTPATIENT)
Dept: SCHEDULING | Facility: HOME HEALTH | Age: 53
End: 2023-01-13
Payer: MEDICARE

## 2023-01-13 VITALS
DIASTOLIC BLOOD PRESSURE: 66 MMHG | OXYGEN SATURATION: 98 % | SYSTOLIC BLOOD PRESSURE: 122 MMHG | RESPIRATION RATE: 16 BRPM | HEART RATE: 78 BPM | TEMPERATURE: 97.7 F

## 2023-01-13 PROCEDURE — G0157 HHC PT ASSISTANT EA 15: HCPCS

## 2023-01-13 NOTE — HOME HEALTH
SUBJECTIVE: Patient felt good today and had no c/o pain. CAREGIVER INVOLVEMENT/ASSISTANCE NEEDED FOR: HRT assists with transportation. HOME HEALTH SUPPLIES BY TYPE AND QUANTITY ORDERED/DELIVERED THIS VISIT INCLUDE: None needed for this visit. OBJECTIVE:  See interventions. PATIENT RESPONSE TO TREATMENT: Pt felt good after walking and exercising. He had no c/o pain. PATIENT LEVEL OF UNDERSTANDING OF EDUCATION PROVIDED: Patient needs reinforcement on pain management techniques and fall prevention. ASSESSMENT OF PROGRESS TOWARD GOALS: Patient is progressing toward goals for gait and stairs. Pt previously needed Supervision to ambulate safely but progressed to distant S for gait training outside today over uneven surfaces. Patient ambulated with good pace and demonstrated improved stability while walking longer distance with no AD. Pt also previously needed SBA to go up and down stairs safely but progressed S for stairs training today. Pt alternated between R LE and L LE to go up and down stairs. He demonstrated good stability with stairs negotiation. Reinforced using handrail for safety at all times when using stairs. In addition, pt performed therapeutic exercises but needed multiple vc's with demonstration for proper technique. Advised CG that he needs assistance to comply with HEP. Moreover, patient demonstrated good ability to get up from the ground with no assistance. He needed vc's to go to use couch to get up easier. Discussed with patient/CG plan to discharge pt from [facility] PT services next week. Pt/CG verbalized understanding and is in agreement with discharge plan. CONTINUED NEED FOR THE FOLLOWING SKILLS: Gait Training, Stairs Training, Transfer Training, [employer], Balance Training, and Therapeutic Exercises. PLAN FOR NEXT VISIT: Patient will be seen 2w1 to increase safety with gait, to improve transfer technique, and to increase strength of B LE.   DISCHARGE PLANNING DISCUSSED WITH PATIENT/CAREGIVER: Discharge patient to family with MD supervision when all goals are met. Pt/Caregiver did verbalize understanding of discharge planning.

## 2023-01-17 ENCOUNTER — TELEPHONE (OUTPATIENT)
Dept: FAMILY MEDICINE CLINIC | Age: 53
End: 2023-01-17

## 2023-01-17 NOTE — TELEPHONE ENCOUNTER
Best Rodriguez from [facility] [employer] called in regards to pt. Requests that PCP, TONG pt's SF 5000 Plus 1.1% crea medication with date. Pt went to the dentist and was told he no longer needed it.  Please advise

## 2023-01-18 ENCOUNTER — TELEPHONE (OUTPATIENT)
Dept: FAMILY MEDICINE CLINIC | Age: 53
End: 2023-01-18

## 2023-01-18 ENCOUNTER — HOME CARE VISIT (OUTPATIENT)
Dept: HOME HEALTH SERVICES | Facility: HOME HEALTH | Age: 53
End: 2023-01-18
Payer: MEDICARE

## 2023-01-18 NOTE — TELEPHONE ENCOUNTER
Spoke w/Vesna from [facility]; pt presents w/a fever of 101.0 degrees; advised to give pt Tylenol; if temp continues to spike, take pt to urgent care/ER for further evaluation. ,

## 2023-01-18 NOTE — TELEPHONE ENCOUNTER
Prudence Brewer from [facility] called on behalf of the patient for two things. The first to check on if we received a fax that was sent to us which I did confirm was received this morning. She also stated that the patient had a temp of 101.0 and they wanted to know what what point to gave him the tylenol. I spoke to Freddy who stated that they should give it to him now and if it continues to spike then he needs to be brought to an urgent care. She stated that they would do that however her supervisor is going to want an updated script for that tylenol stating at what temp should they give the patient that medication. She stated that there was no rush and that it can be picked up at any time and that we can call the patient for that.

## 2023-01-18 NOTE — TELEPHONE ENCOUNTER
Miguel A Torres called on behalf of the pt stating that the   Plus 1.1%(Toothpaste)needs to be DC by Dr Godwin Lange and then faxed over to the pharmacy as well so they can DC the medication. Tasia Claude would also like a copy faxed over to her office because they have to continue giving meds until the order is DC.  Please to  [phone] ASAP

## 2023-01-20 ENCOUNTER — HOME CARE VISIT (OUTPATIENT)
Dept: SCHEDULING | Facility: HOME HEALTH | Age: 53
End: 2023-01-20
Payer: MEDICARE

## 2023-01-20 PROCEDURE — G0151 HHCP-SERV OF PT,EA 15 MIN: HCPCS

## 2023-01-20 PROCEDURE — G0152 HHCP-SERV OF OT,EA 15 MIN: HCPCS

## 2023-01-20 NOTE — TELEPHONE ENCOUNTER
As requested by the group home  for German Sorensen toothpaste has been discontinued on January 17, 2023 based on recent dentist recommendations    SF 5000 Plus 1.1 % crea [[id number]]  DISCONTINUED    I have printed a discontinuation prescription    Or we can send him a letter with above

## 2023-01-20 NOTE — TELEPHONE ENCOUNTER
Per our discussion yesterday, how would you like to document and send confirmation that the toothpaste has been discontinued per the current dentist's documentation?

## 2023-01-21 VITALS — SYSTOLIC BLOOD PRESSURE: 108 MMHG | RESPIRATION RATE: 18 BRPM | DIASTOLIC BLOOD PRESSURE: 74 MMHG | TEMPERATURE: 98.2 F

## 2023-01-21 NOTE — HOME HEALTH
SUBJECTIVE: \"I still feel a little sick\"  REQUIRES CAREGIVER ASSISTANCE FOR: Transportation, med management  MEDICATIONS REVIEWED AND RECONCILED: Meds reviewed and ondansetron added by OT   NEXT MD APPT: 1/24/23 cardiology      Pt is seen for reassessment vs discharge. Pt went to urgent care 1/18 due to nausea and vomiting with fever. Was given Ondansetron and tested for flu and COVID. Still unknown etiology but per notes possibly dehydration related. Pt's CG reports pt has been less steady since being sick. Pt was visibly less steady while amb in the home and outside on grass and sidewalk. He had a few instances of what appeared be festenating gait with pt having difficulty initiating gait particularly in small spaces such as the bathroom. Pt was able to demo floor transfer w/o A but needed some verbal cuing for correct technique. Youngblood balance 34/56 essentially unchanged from eval, 33/56. Pt also needed increased repetition of cues for safety and to follow commands for the assessment. Overall pt cont to need SBA/S and cues for safe mobility. Recommendations: training with AD-rollator, additional PT, outdoor ramp to home entry due to steps. PATIENT RESPONE TO TX: Pt demo'd positive response to PT shown by full participation w/o pain and with stable vitals   PATIENT EDUCATION PROVIDED THIS VISIT: safety, HEP, walking/mobility        PATIENT LEVEL OF UNDERSTANDING OF EDUCATION PROVIDED: Pt verbalizes understanding and will need cuing and repetition for carryover    HEP consisting of:    1. Walking every hour during the daytime for 3-5 minutes. 2. Standing: HR/TR, mini squats, and hip flexion x 20 repetitions each 3x/day. Written instructions issued. Patient/caregiver verbalized understanding. Pt is demonstrating inconsistent progress. His CG reports that his balance and cognition fluctuate depending on time of day and that he just has \"good and bad days\".   PT discussed use of AD with pt and he agreed to try a rollator and see if he likes it. Pt will benefit from additional PT for training with AD and increase carry over with safety recommendations  PT to contact Dr June Olvera for additional visits.

## 2023-01-23 ENCOUNTER — TELEPHONE (OUTPATIENT)
Dept: FAMILY MEDICINE CLINIC | Age: 53
End: 2023-01-23

## 2023-01-23 VITALS — DIASTOLIC BLOOD PRESSURE: 74 MMHG | RESPIRATION RATE: 17 BRPM | SYSTOLIC BLOOD PRESSURE: 108 MMHG | TEMPERATURE: 98.2 F

## 2023-01-23 NOTE — HOME HEALTH
Caregiver involvement: Susannemarcellus Donte (aide) is with pt during the day and provides S for pt safety with ADL and IADL tasks. Medications reviewed and all medications are available in the home this visit. The following education was provided regarding medications, medication interactions, and look alike medications (specify): Continue as directed by MD.    Medications  are effective at this time. Patient education provided this visit: see ADL note    Sharps education provided:  na    Patient level of understanding of education provided: see ADL note    Skilled Care Performed this visit: Completed OT evaluation and assessment for safety with ADL and mobility. Patient response to procedure performed:  Mr. Ioana Napoles had a positive response to therapy. He denies having pain and was agreeable to participate with therapy assessment. Patient's Progress towards personal goals: Mr. Ioana Napoles has good rehab potential with support from his caregiver daily. He currently requires CGA for safety with ambulation over uneven surfaces outside and inside bathroom due to unsafe nicolasa. CGA needed for safety with ADL/bathing due to inability to reach feet safely while standing. Goal is to advance pt to S level after pt and caregiver training on DME and energy conservation strategies. Home exercise program: B UE strengthening against gravity for shoulder flexion/extension, overhead press, chest press, ab/adduction and elbow flexion/extension    Continued need for the following skills: Physical Therapy and Occupational Therapy    Discharge Plans:  1w3 with plans to discharge to self once maximal potential has been achieved with self care and functional mobility in the home setting. CONTINUED NEED FOR THE FOLLOWING SKILLS: HH OT is medically necessary to address barriers of decreased balance, generalized weakness, impulsivity, decreased safety awareness and history of reoccurring falls.   These barriers limit pt's participation in ADL and functional mobility safely and would benefit from continued skilled OT to maximize independence and reduce burden on caregiver. List of Comorbidities:  Visit Diagnoses    Prostate cancer ([facility] [address]  Psychosis, unspecified psychosis type ([facility] [address]  Cerebral palsy, unspecified type ([facility] [address]  Anoxic brain damage ([facility] [address]  Cognitive disorder  At risk for falling  Orthostatic hypotension  Unsteady gait when walking  Urge incontinence of urine  Neurogenic bladder  Weight loss      FTF verified pay per visit 1/9/23 TS              Inpatient Notes         HPI: Vickie Pollack is here for follow up on weight loss . He fell and went to the ER 12/13/22 , he was found by the staff after he fell with swelling of right temple  He has appointment with cardiology on 1/24 to rule out any arrhythmias  ER note was reviewed  No weight loss since last visit  Normal appetite  Had seen Gastroenterology he os on omeprazole , on feb 23 he has appointment for upper GI and endoscopy   Jennifer freed is accompanied by one of the employer where he lives at, requesting for adult diapers, urinal , toilet seat riser to elevate his toilet seat ,to help with his daily living   He will need evaluation by home care for home safety    Assessment and plan   Plan of care has been discussed with the patient, he agrees to the plan and verbalized understanding.   All his questions were answered  More than 50% of the time spent in this visit was counseling the patient about illness and treatment options

## 2023-01-23 NOTE — TELEPHONE ENCOUNTER
Confirmed w/Hunter w/ home health, verbal given for pt to continue w/physical therapy; order will be chart for PCP to sign.

## 2023-01-23 NOTE — CASE COMMUNICATION
Occupational Therapy Evaluation    1w3    Mr. Elliott Antoine is ambulating without use of an AD and unassisted in the home. Observed pt having difficulty with ambulation outside of home due to uneven surfaces. Pt's caregiver offered handheld assistance for safety when going up/down curb and up grassy incline (avoided stairs because c/o being steep and fear of falling). Mr. Elliott Antoine has a history of falls and caregivers are asking for advice to  improve safety in pts home. He has a tub/shower combo with a steep tub wall. Mr. Elliott Antoine is using the towel bar above the toilet to help with balance when getting into tub. This is strongly discouraged as the towel bar is loose in the wall and only secured through the dry wall. Instead, suggested horizontal grab bar install over toilet. Suggested use of a tub transfer bench to help pt safely get in and out of tub. Order will be req ureddy from MD.  Mr. Elliott Antoine demonstrates bathing by only washing down to knees. He is unsafe to reach below knees while standing. Use of the tub bench would allow pt to sit safely while washing LEs and to reduce risk of falls. Other option would be for a roll-in handicap accessible shower with a built in seat. Oscar in bathroom is peeling up and presents as an increased risk for falls. [facility] as it could result in a pt fall and injury. Mr. Angela Mazariegos toilet seat is low. He has difficulty with safe sit <> stand and uses the sink ledge to help push self up. Suggested toilet seat riser/handicap height toilet with grab bar attachment to help with safe sit <> stand. He is dressing, bathing and toileting self daily; however, caregiver has concerns that pt is not performing his ADL safely due to his ongoi ng history of falls. Mr. Elliott Antoine has difficulty with recall of verbal instructions and needs cues as reminders.   Ongoing skilled OT is indicated to educate caregiver and pt on energy conservation strategies with ADL to maximize pt safety. Goal is for Mr. Keiry Sams to be able to carryout ADL participation with S from caregiver to provide ongoing cues for safety.

## 2023-01-23 NOTE — TELEPHONE ENCOUNTER
Julio Smith from [address] [place] [address] [employer] [facility] called on behalf of the pt to get further information in regards to the Cardiologist the pt was referred. Saul Antunez stated that he would jacqueline to know if Dr Natalee Sosa would be able to be referred to a cardiologist in [facility]. There in no one in specific in [place].  Please advise

## 2023-01-23 NOTE — TELEPHONE ENCOUNTER
Yolanda Hodge from [facility] called on behalf of the pt requesting additional PT visits. Yolanda Hodge stated that she is able to take a verbal and then send over documents.  Please advise

## 2023-01-27 ENCOUNTER — HOME CARE VISIT (OUTPATIENT)
Dept: SCHEDULING | Facility: HOME HEALTH | Age: 53
End: 2023-01-27
Payer: MEDICARE

## 2023-01-27 VITALS
HEART RATE: 90 BPM | SYSTOLIC BLOOD PRESSURE: 123 MMHG | OXYGEN SATURATION: 98 % | RESPIRATION RATE: 18 BRPM | TEMPERATURE: 97.8 F | DIASTOLIC BLOOD PRESSURE: 83 MMHG

## 2023-01-27 PROCEDURE — G0158 HHC OT ASSISTANT EA 15: HCPCS

## 2023-01-27 PROCEDURE — G0157 HHC PT ASSISTANT EA 15: HCPCS

## 2023-01-27 NOTE — HOME HEALTH
SUBJECTIVE: Upon arrival pt standing in living room and pts aide at kitchen table, pt reported he wanted to work on simulated shower rather than full ADL today. CAREGIVER ASSISTANCE NEEDED FOR: pts PIPE Travis present during visit for education and training  MEDICATIONS REVIEWED AND UPDATED: no medication changes reported this visit. .  OBJECTIVE: see interventions  PATIENT RESPONSE TO TREATMENT:  Pt demonstrated positive response to treatment with no increased pain and good participation. Pravin Damico PATIENT/CAREGIVER EDUCATION PROVIDED THIS VISIT: Therapist educated pt on safety with shower seat, non-skid mat and additional grab bar for shower safety. Educated on UB HEP and importance of daily exercise for strengthening. PATIENT LEVEL OF UNDERSTANDING OF EDUCATION PROVIDED: Pt verbalized he does not want a shower seat but was open to mat and bar placement, will discuss with . Pt demonstrated good teachback of HEP with use of worksheet as guide. ASSESSMENT AND PROGRESS TOWARD GOALS:  Pt demonstrated good progress towards ADL, transfer and HEP goals with decreased assistance and good understanding of recommendations. PLAN: next visit will be for HEP and ADL strategy review and OTDC. DISCHARGE PLANNING DISCUSSED: Discharge to self and family under MD supervision once all goals have been met or patient has reached maximum potential.  Frequency remaininw1 remaining with pt and CG aware of OTDC next week.

## 2023-01-28 NOTE — HOME HEALTH
SUBJECTIVE: Patient felt good today and had no c/o pain. CAREGIVER INVOLVEMENT/ASSISTANCE NEEDED FOR: HRT assists with transportation. HOME HEALTH SUPPLIES BY TYPE AND QUANTITY ORDERED/DELIVERED THIS VISIT INCLUDE: None needed for this visit. OBJECTIVE:  See interventions. PATIENT RESPONSE TO TREATMENT: Pt felt good after walking and exercising. He had no c/o pain. PATIENT LEVEL OF UNDERSTANDING OF EDUCATION PROVIDED: Patient needs reinforcement on pain management techniques and fall prevention. ASSESSMENT OF PROGRESS TOWARD GOALS: Patient is progressing toward goals for gait and stairs. Pt continues to need distant S for gait training outside today to ambulate safely over uneven surfaces. Patient ambulated with decreased foot clearance and leans to his right. Gave multiple vc's for pt to lift B foot higher to clear floor safely and to maintain erect posture. Pt has difficulty lifting B foot higher and maintaining upright posture. He refused to use Rollator walker for stability. Pt also previously needed S to go up and down stairs safely but progressed distant S for stairs training today. Pt alternated between his R LE and L LE to go up and down stairs. Reinforced using handrail to go up and down stairs for safety. Pt verbalized understanding. In addition, reviewed HEP and instructed to comply with follow-up. Discussed with patient/CG plan to discharge pt from [facility] PT services next week. Pt/CG verbalized understanding and is in agreement with discharge plan. CONTINUED NEED FOR THE FOLLOWING SKILLS: Gait Training, Stairs Training, Transfer Training, [employer], Balance Training, and Therapeutic Exercises. PLAN FOR NEXT VISIT: Patient will be seen 2w1 to increase safety with gait, to improve transfer technique, and to increase strength of B LE. DISCHARGE PLANNING DISCUSSED WITH PATIENT/CAREGIVER: Discharge patient to family with MD supervision when all goals are met.  Pt/Caregiver did verbalize understanding of discharge planning.

## 2023-01-30 ENCOUNTER — HOME CARE VISIT (OUTPATIENT)
Dept: SCHEDULING | Facility: HOME HEALTH | Age: 53
End: 2023-01-30
Payer: MEDICARE

## 2023-01-30 VITALS
TEMPERATURE: 98 F | OXYGEN SATURATION: 98 % | HEART RATE: 98 BPM | DIASTOLIC BLOOD PRESSURE: 83 MMHG | RESPIRATION RATE: 16 BRPM | SYSTOLIC BLOOD PRESSURE: 123 MMHG

## 2023-01-30 VITALS
SYSTOLIC BLOOD PRESSURE: 127 MMHG | RESPIRATION RATE: 16 BRPM | OXYGEN SATURATION: 96 % | DIASTOLIC BLOOD PRESSURE: 81 MMHG | TEMPERATURE: 97.6 F | HEART RATE: 92 BPM

## 2023-01-30 PROCEDURE — G0152 HHCP-SERV OF OT,EA 15 MIN: HCPCS

## 2023-01-30 PROCEDURE — G0157 HHC PT ASSISTANT EA 15: HCPCS

## 2023-01-30 NOTE — HOME HEALTH
SUBJECTIVE: Patient cannot remember therapeutic exercises and needs assistance to follow-up with HEP. CAREGIVER INVOLVEMENT/ASSISTANCE NEEDED FOR: HRT assists with transportation. HOME HEALTH SUPPLIES BY TYPE AND QUANTITY ORDERED/DELIVERED THIS VISIT INCLUDE: None needed for this visit. OBJECTIVE:  See interventions. PATIENT RESPONSE TO TREATMENT: Pt stated he felt tired after walking and exercising since he also had OT prior to Routine PT visit. PATIENT LEVEL OF UNDERSTANDING OF EDUCATION PROVIDED: Patient is progressed well toward goals for gait and stairs. Pt previously needed distant S to ambulate safely but progressed to I for gait training outside today over uneven surfaces. Pt ambulated with R trunk lean but demonstrated improved stability and good safety awareness. Pt refused to use any AD when ambulating. Pt also previously needed distant S to go up and down stairs safely but progressed Mod I for stairs training today. Pt alternated between his R LE and L LE to go up and down stairs. Pt demonstrated good ability to negotiate stairs and stated he will comply with using handrail when going up and down stairs. In addition, pt demonstrated improved ability to perform standing therapeutic exercises with no rest breaks but lacks carryover due to poor memory. Instructed pt and CG to comply with follow-up for HEP. Discussed with patient/CG plan to discharge pt from [facility] PT services next visit. Pt/CG verbalized understanding and is in agreement with discharge plan. CONTINUED NEED FOR THE FOLLOWING SKILLS: None. PLAN FOR NEXT VISIT: Patient will be seen 1w1 to reassess safety with gait/stairs, transfers, balance, and strength of B LE. DISCHARGE PLANNING DISCUSSED WITH PATIENT/CAREGIVER: Discharge patient to family with MD supervision when all goals are met. Pt/Caregiver did verbalize understanding of discharge planning.     Assessment and Summary of Care:  Patient's current functional status before discharge is as follows  Strength: Progressing. ROM: NT  Bed Mobility: Mod I  Transfers: Mod I  Gait/WC mobility: Independent. Pt refuses to use any AD for ambulation. Stairs: Negotiated up and down 2 stairs using handrail Mod I  Special Tests: NT  Recommendations: Discharge patient to self under MD Supervision.

## 2023-01-30 NOTE — Clinical Note
Mr Ronak Woodson was seen by [facility] for 3 visits to address increased safety and I with ADLs, transfers, HEP education and safety education. Pt CGs present during visits for carry over due to pt impaired memory and cognition. Pt distant S for bathing, dressing, toileting and grooming with report of daily performance prior to going to work program and no falls reported. Pt distant S for chair, commode, bed and shower transfers with good hand placement, safety and no AD support. Pt verbalized understanding of additional grab bar placement recommendation and use of non-skid floor mat. Pt demonstrated good dynamic standing balance reaching in overhead cabinets while searching for food to prepare and good static standing balance with no LOB and no AD. Pt reports ability to complete UE HEP and demonstrates exercises with no complaints of pain with S from CGs to remind him to initiate, pt 4/5 on MMT for BUE strength. MD and care team aware of OTDC this visit with pt discharge to self.
Thanks for seeing him Doreen Zamarripa.  ----- Message -----  From: Cindy Metz  Sent: 1/30/2023   5:09 PM EST  To: Shona Cash OT, Winnie Jorgensen MD, *      Mr Gooden was seen by [facility] for 3 visits to address increased safety and I with ADLs, transfers, HEP education and safety education. Pt CGs present during visits for carry over due to pt impaired memory and cognition. Pt distant S for bathing, dressing, toileting and grooming with report of daily performance prior to going to work program and no falls reported. Pt distant S for chair, commode, bed and shower transfers with good hand placement, safety and no AD support. Pt verbalized understanding of additional grab bar placement recommendation and use of non-skid floor mat. Pt demonstrated good dynamic standing balance reaching in overhead cabinets while searching for food to prepare and good static standing balance with no LOB and no AD. Pt reports ability to complete UE HEP and demonstrates exercises with no complaints of pain with S from CGs to remind him to initiate, pt 4/5 on MMT for BUE strength. MD and care team aware of OTDC this visit with pt discharge to self.
No lesions; no rash

## 2023-01-30 NOTE — HOME HEALTH
Pt to be discharged this visit as he has met all goals at this time and is ready for discharge, see DC summary for details.

## 2023-01-31 ENCOUNTER — HOME CARE VISIT (OUTPATIENT)
Dept: HOME HEALTH SERVICES | Facility: HOME HEALTH | Age: 53
End: 2023-01-31
Payer: MEDICARE

## 2023-01-31 NOTE — CASE COMMUNICATION
reports patient has been taken to Urgent care due to fever and increased problems with balance. Patient lives in a group home. Missed visit for Physical Therapy.

## 2023-02-01 ENCOUNTER — HOME CARE VISIT (OUTPATIENT)
Dept: SCHEDULING | Facility: HOME HEALTH | Age: 53
End: 2023-02-01
Payer: MEDICARE

## 2023-02-01 VITALS
SYSTOLIC BLOOD PRESSURE: 120 MMHG | OXYGEN SATURATION: 98 % | DIASTOLIC BLOOD PRESSURE: 70 MMHG | HEART RATE: 91 BPM | RESPIRATION RATE: 16 BRPM | TEMPERATURE: 97.7 F

## 2023-02-01 PROCEDURE — G0151 HHCP-SERV OF PT,EA 15 MIN: HCPCS

## 2023-02-01 NOTE — Clinical Note
Dr Prieto Degree - please order outpt PT for Mr Vonda Lancaster. He would like to go to [facility]. Discharge plan: Discharge from [facility] services as all goals have been met. Pt is requesting a referral to [facility]. Dr Quintne Ornelas office has been notified of DC from home care with goals met/resolved. Patient is s/p referral to home care PT due to recent falls with Dx cerebral palsy and has been treated for strengthening, gait training, stair training, HEP training, safety training, and balance training. Pt has made progress and met/resolved PT goals. He is limited by fluctuating performance per his CG with pt needing S/set up at times especially in the morning or if he is not feeling well. ROM: Decreased hip flexion and extension, decreased knee extension. Influence of LE extensor tone noted. Pt has scoliosis with R concave curve. ROM at eval: Decreased hip flexion and extension, decreased knee extension. Influence of LE extensor tone noted. Pt has scoliosis with R concave curve  STRENGTH: 5 STS = 11 sec. B hips 4/5; knees and ankles 5/5. Strength at eval: B hips grossly 3+/5, knees and ankles 5/5  WOUNDS:No wounds  Wound status at eval: No wounds  BED MOBILITY:I supine<>sit and rolling L/R. Bed mobility at eval: I supine<>sit and rolling L/R  TRANSFERS:I sit<>stand from varied surfaces in the home. I floor transfer. Transfers at eval: I sit<>stand from varied surfaces in the home  GAIT: Pt amb in his apt I w/o AD. Pt amb outside MOD I on uneven surface. Per LPTA note pt was MOD I with amb outside on uneven surface last visit. Per pt's CG his status fluctuates and at times he is unsteady on uneven surface. Pt has declined to use AD when PT attempted to teach him to use a rollator. Gait characterized by flexed hips/knees, R trunk lean. Gait at eval: Pt amb with flexed hips and knees. Trunk leans to R.   No AD used, pt needs S for safe amb indoors and outdoors on uneven surface  STAIRS: Pt amb up/down 3 steps with rail and S. Stairs at eval CG up/down 3 steps with rail outside   BALANCE: Pt scored 41/56 on GUTIERREZ Balance Assessment placing him at low risk for falls. Balance at eval: GUTIERREZ score 33/56 = moderate fall risk  OT Summary:  Mr Tamy Faulkner was seen by [facility] for 3 visits to address increased safety and I with ADLs, transfers, HEP education and safety education. Pt CGs present during visits for carry over due to pt impaired memory and cognition. Pt distant S for bathing, dressing, toileting and grooming with report of daily performance prior to going to work program and no falls reported. Pt distant S for chair, commode, bed and shower transfers with good hand placement, safety and no AD support. Pt verbalized understanding of additional grab bar placement recommendation and use of non-skid floor mat. Pt demonstrated good dynamic standing balance reaching in overhead cabinets while searching for food to prepare and good static standing balance with no LOB and no AD. Pt reports ability to complete UE HEP and demonstrates exercises with no complaints of pain with S from CGs to remind him to initiate, pt 4/5 on MMT for BUE strength. MD and care team aware of OTDC this visit with pt discharge to self.

## 2023-02-01 NOTE — HOME HEALTH
SUBJECTIVE: \"I think I'm doing very well\"  REQUIRES CAREGIVER ASSISTANCE FOR: Transportation, IADL's, med management  MEDICATIONS REVIEWED AND RECONCILED: MEd rec done with no changes  NEXT MD APPT: TBD    ROM: Decreased hip flexion and extension, decreased knee extension. Influence of LE extensor tone noted. Pt has scoliosis with R concave curve. ROM at eval: Decreased hip flexion and extension, decreased knee extension. Influence of LE extensor tone noted. Pt has scoliosis with R concave curve  STRENGTH: 5 STS = 11 sec. B hips 4/5; knees and ankles 5/5. Strength at eval: B hips grossly 3+/5, knees and ankles 5/5  WOUNDS:No wounds  Wound status at [facility]: No wounds  BED MOBILITY:I supine<>sit and rolling L/R. Bed mobility at [facility]: I supine<>sit and rolling L/R  TRANSFERS:I sit<>stand from varied surfaces in the home. I floor transfer. Transfers at [facility]: I sit<>stand from varied surfaces in the home  GAIT: Pt amb in his apt I w/o AD. Pt amb outside MOD I on uneven surface. Per LPTA note pt was MOD I with amb outside on uneven surface last visit. Per pt's CG his status fluctuates and at times he is unsteady on uneven surface. Pt has declined to use AD when PT attempted to teach him to use a rollator. Gait characterized by flexed hips/knees, R trunk lean. Gait at eval: Pt amb with flexed hips and knees. Trunk leans to R. No AD used, pt needs S for safe amb indoors and outdoors on uneven surface  STAIRS: Pt amb up/down 3 steps with rail and S. Stairs at [facility] CG up/down 3 steps with rail outside   BALANCE: Pt scored 41/56 on GUTIERREZ Balance Assessment placing him at low risk for falls.   Balance at [facility]: GUTIERREZ score 33/56 = moderate fall risk    PATIENT RESPONE TO TX: Pt demo'd positive response to PT shown by full participation w/o pain and with stable vitals   PATIENT EDUCATION PROVIDED THIS VISIT: safety, HEP, walking, deep breathing        PATIENT LEVEL OF UNDERSTANDING OF EDUCATION PROVIDED:  Pt verbalizes understanding of all information and demo's back as appropriate    HEP consisting of:    1. Walking every hour during the daytime for 3-5 minutes. 2. Standing: HR/TR, mini squats, hip flexion, hip abduction, and hamstring curls x 20 repetitions each 3x/day. Written instructions issued. Patient/caregiver verbalized understanding. Patient is s/p referral to home care PT due to recent falls with Dx cerebral palsy and has been treated for strengthening, gait training, stair training, HEP training, safety training, and balance training. Pt has made progress and met/resolved PT goals. He is limited by fluctuating performance per his CG with pt needing S/set up at times especially in the morning or if he is not feeling well. ROM: Decreased hip flexion and extension, decreased knee extension. Influence of LE extensor tone noted. Pt has scoliosis with R concave curve. ROM at eval: Decreased hip flexion and extension, decreased knee extension. Influence of LE extensor tone noted. Pt has scoliosis with R concave curve  STRENGTH: 5 STS = 11 sec. B hips 4/5; knees and ankles 5/5. Strength at eval: B hips grossly 3+/5, knees and ankles 5/5  WOUNDS:No wounds  Wound status at eval: No wounds  BED MOBILITY:I supine<>sit and rolling L/R. Bed mobility at eval: I supine<>sit and rolling L/R  TRANSFERS:I sit<>stand from varied surfaces in the home. I floor transfer. Transfers at eval: I sit<>stand from varied surfaces in the home  GAIT: Pt amb in his apt I w/o AD. Pt amb outside MOD I on uneven surface. Per LPTA note pt was MOD I with amb outside on uneven surface last visit. Per pt's CG his status fluctuates and at times he is unsteady on uneven surface. Pt has declined to use AD when PT attempted to teach him to use a rollator. Gait characterized by flexed hips/knees, R trunk lean. Gait at eval: Pt amb with flexed hips and knees. Trunk leans to R.   No AD used, pt needs S for safe amb indoors and outdoors on uneven surface  STAIRS: Pt amb up/down 3 steps with rail and S. Stairs at eval CG up/down 3 steps with rail outside   BALANCE: Pt scored 41/56 on GUTIERREZ Balance Assessment placing him at low risk for falls. Balance at [facility]: GUTIERREZ score 33/56 = moderate fall risk  Discharge plan: Discharge from [facility] services as all goals have been met. Dr Viri Nunez office has been notified of DC from home care with goals met/resolved.

## 2023-02-13 DIAGNOSIS — I95.1 ORTHOSTATIC HYPOTENSION: Primary | ICD-10-CM

## 2023-02-14 DIAGNOSIS — G80.9 CEREBRAL PALSY, UNSPECIFIED TYPE (HCC): Primary | ICD-10-CM

## 2023-02-14 DIAGNOSIS — R26.81 UNSTEADY GAIT WHEN WALKING: ICD-10-CM

## 2023-02-16 ENCOUNTER — TELEPHONE (OUTPATIENT)
Dept: FAMILY MEDICINE CLINIC | Facility: CLINIC | Age: 53
End: 2023-02-16

## 2023-02-16 NOTE — LETTER
2/17/2023        [address] [address]        To Whom It May Concern:    Brigido Clifton, date of birth 1970, is under the care of [address] [place] [address]. Patient is to hold his multivitamins from 02/17/2023 until 02/23/2023 for his upcoming colonoscopy procedure on 02/21/2023. Please contact our office if there are questions or concerns.       Sincerely,      Juve Gabriel MD

## 2023-02-16 NOTE — TELEPHONE ENCOUNTER
Ms Gaspar called from [facility] asking for us to fax over the hold order for his multivitmains asap. Pt is having a colonoscopy on 2/23/23 and the gastro doctor may want that to be on hold from 2/17/2023 to 2/23/2023. Chasity stated that she would tomorrow with an update in regards to that however she would still like that faxed over.     Fax [phone]

## 2023-02-16 NOTE — TELEPHONE ENCOUNTER
Hold order for multi-vitamins from 2/17/23 / until 2/23/23. Pt is having colonoscopy on the [address]. Galina Cano stated that they could come and  the order once finished.  Please advise

## 2023-02-17 NOTE — TELEPHONE ENCOUNTER
Please generate a letter saying that it is okay for him to hold multivitamins starting today February 17 to his upcoming procedure on February 23

## 2023-04-21 PROBLEM — M41.9 SCOLIOSIS DEFORMITY OF SPINE: Status: ACTIVE | Noted: 2023-04-20

## 2023-05-02 ENCOUNTER — TELEPHONE (OUTPATIENT)
Dept: FAMILY MEDICINE CLINIC | Facility: CLINIC | Age: 53
End: 2023-05-02

## 2023-05-02 ENCOUNTER — OFFICE VISIT (OUTPATIENT)
Dept: FAMILY MEDICINE CLINIC | Facility: CLINIC | Age: 53
End: 2023-05-02

## 2023-05-02 ENCOUNTER — HOSPITAL ENCOUNTER (OUTPATIENT)
Facility: HOSPITAL | Age: 53
Setting detail: SPECIMEN
Discharge: HOME | End: 2023-05-05
Payer: MEDICARE

## 2023-05-02 VITALS
HEART RATE: 85 BPM | OXYGEN SATURATION: 95 % | SYSTOLIC BLOOD PRESSURE: 92 MMHG | TEMPERATURE: 98.4 F | WEIGHT: 153.6 LBS | HEIGHT: 71 IN | RESPIRATION RATE: 16 BRPM | BODY MASS INDEX: 21.5 KG/M2 | DIASTOLIC BLOOD PRESSURE: 68 MMHG

## 2023-05-02 DIAGNOSIS — G93.1 ANOXIC BRAIN DAMAGE, NOT ELSEWHERE CLASSIFIED (HCC): ICD-10-CM

## 2023-05-02 DIAGNOSIS — G80.9 CEREBRAL PALSY, UNSPECIFIED TYPE (HCC): ICD-10-CM

## 2023-05-02 DIAGNOSIS — C61 MALIGNANT NEOPLASM OF PROSTATE (HCC): ICD-10-CM

## 2023-05-02 DIAGNOSIS — R63.4 WEIGHT LOSS, NON-INTENTIONAL: ICD-10-CM

## 2023-05-02 DIAGNOSIS — R63.4 WEIGHT LOSS, NON-INTENTIONAL: Primary | ICD-10-CM

## 2023-05-02 PROCEDURE — 3078F DIAST BP <80 MM HG: CPT | Performed by: LEGAL MEDICINE

## 2023-05-02 PROCEDURE — 99214 OFFICE O/P EST MOD 30 MIN: CPT | Performed by: LEGAL MEDICINE

## 2023-05-02 PROCEDURE — 80307 DRUG TEST PRSMV CHEM ANLYZR: CPT

## 2023-05-02 PROCEDURE — 3074F SYST BP LT 130 MM HG: CPT | Performed by: LEGAL MEDICINE

## 2023-05-02 RX ORDER — CALORIC SUPPLEMENT
1 LIQUID (ML) ORAL 2 TIMES DAILY
Qty: 60 EACH | Refills: 5 | Status: SHIPPED | OUTPATIENT
Start: 2023-05-02 | End: 2023-06-01

## 2023-05-02 RX ORDER — CALORIC SUPPLEMENT
1 LIQUID (ML) ORAL 2 TIMES DAILY
Qty: 60 EACH | Refills: 5 | Status: SHIPPED | OUTPATIENT
Start: 2023-05-02 | End: 2023-05-02 | Stop reason: SDUPTHER

## 2023-05-02 SDOH — ECONOMIC STABILITY: INCOME INSECURITY: HOW HARD IS IT FOR YOU TO PAY FOR THE VERY BASICS LIKE FOOD, HOUSING, MEDICAL CARE, AND HEATING?: NOT HARD AT ALL

## 2023-05-02 SDOH — ECONOMIC STABILITY: FOOD INSECURITY: WITHIN THE PAST 12 MONTHS, YOU WORRIED THAT YOUR FOOD WOULD RUN OUT BEFORE YOU GOT MONEY TO BUY MORE.: NEVER TRUE

## 2023-05-02 SDOH — ECONOMIC STABILITY: HOUSING INSECURITY
IN THE LAST 12 MONTHS, WAS THERE A TIME WHEN YOU DID NOT HAVE A STEADY PLACE TO SLEEP OR SLEPT IN A SHELTER (INCLUDING NOW)?: NO

## 2023-05-02 SDOH — ECONOMIC STABILITY: FOOD INSECURITY: WITHIN THE PAST 12 MONTHS, THE FOOD YOU BOUGHT JUST DIDN'T LAST AND YOU DIDN'T HAVE MONEY TO GET MORE.: NEVER TRUE

## 2023-05-02 ASSESSMENT — ENCOUNTER SYMPTOMS
EYE PAIN: 0
SINUS PRESSURE: 0
ABDOMINAL PAIN: 0
EYE ITCHING: 0
VOMITING: 0
EYE DISCHARGE: 0
CHOKING: 0
VOICE CHANGE: 0
CONSTIPATION: 0
EYE REDNESS: 0
FACIAL SWELLING: 0
SHORTNESS OF BREATH: 0
CHEST TIGHTNESS: 0
RECTAL PAIN: 0
BLOOD IN STOOL: 0
TROUBLE SWALLOWING: 0
NAUSEA: 0
SINUS PAIN: 0
DIARRHEA: 0
BACK PAIN: 0
APNEA: 0
ANAL BLEEDING: 0
COUGH: 0
WHEEZING: 0
SORE THROAT: 0
STRIDOR: 0

## 2023-05-02 NOTE — TELEPHONE ENCOUNTER
Spoke w/Abby, pt's caregiver, BP reading today was: 88/67; pt has lost 13-14 lbs in last week or so; pt is scheduled on:   Future Appointments   Date Time Provider Kirt Smith   5/2/2023  2:45 PM Lizet Gonzalez MD [facility] AMB   5/4/2023  8:30 AM Mikayla Green MD [facility] AMB   6/2/2023  1:00 PM Eric Adamson PA-C [facility] [place] Sched

## 2023-05-02 NOTE — TELEPHONE ENCOUNTER
Rere Vincent called on behalf of the pt wanting to get a sooner appt for the pt. Rere Vincent stated that the pt has lost quite a bit of weight from 4/22/23 to now he has lost 13 lbs also his blood pressure today was 80/50 but they have been monitoring his pressure. Rere Vincent wanted to inform Dr Jonnathan Zaavla about what has been going on with the pt.

## 2023-05-02 NOTE — PROGRESS NOTES
Esteban Conn is a 47 y.o. male (: 1970) presenting to address:    Chief Complaint   Patient presents with    Blood Pressure Check     Weight loss         Vitals:    23 1504   BP: 92/68   Pulse: 85   Resp: 16   Temp: 98.4 °F (36.9 °C)   SpO2: 95%       Coordination of Care Questionaire:   1. \"Have you been to the ER, urgent care clinic since your last visit? Hospitalized since your last visit? \"  Amos Barba    2. \"Have you seen or consulted any other health care providers outside of the [address] [place] [address] since your last visit? \" No     3. For patients aged 39-70: Has the patient had a colonoscopy / FIT/ Cologuard? Yes - no Care Gap present      If the patient is female:    4. For patients aged 41-77: Has the patient had a mammogram within the past 2 years? NA - based on age or sex      11. For patients aged 21-65: Has the patient had a pap smear? NA - based on age or sex    Advanced Directive:   1. Do you have an Advanced Directive? Yes    2. Would you like information on Advanced Directives?  No

## 2023-05-02 NOTE — PROGRESS NOTES
Shabnam Pelayo     Chief Complaint   Patient presents with    Blood Pressure Check     Weight loss       BP 92/68   Pulse 85   Temp 98.4 °F (36.9 °C) (Temporal)   Resp 16   Ht 5' 11\" (1.803 m)   Wt 153 lb 9.6 oz (69.7 kg)   SpO2 95%   BMI 21.42 kg/m²         HPI:  Shabnam Pelayo  is here accompanied with behavioral specialist Abby    Lost 9 lbs since last month ,is seeing neurology with [facility] has been stopped on April 7 by neurologist  I have called Dr. Donny Bush Discussed his case with her as far as weight loss she does not suspect there is an underlying neurological reason behind weight loss  Patient lost about 30 pounds over the course of 1 year, I discussed this with his behavioral specialist that accompanied him today, he is eating at least 1800 alvin/day if not more he needs to be full meals and snacks as well and the staff observing the patient eating. He had work-up by gastroenterology, and also had multiple times blood work abdominal ultrasound and abdominal CT scan with no significant of any malignancy. Past Medical History:   Diagnosis Date    Brain anoxic injury ([facility] [address]     Depression 02/01/2005    recurrent episode, Major Depression    Psychotic disorder ([facility] [address] 12/07/2007    Intellectual disability     Vertigo 03/17/2018     Past Surgical History:   Procedure Laterality Date    TRACHEOSTOMY N/A 1985    UROLOGICAL SURGERY  05/12/2022    CYSTOSCOPY; TRANSPERINEAL PROSTATE BIOPSY; Dr. Suad Schultz History     Tobacco Use    Smoking status: Never    Smokeless tobacco: Never   Substance Use Topics    Alcohol use: Yes       No family history on file. Review of Systems   Constitutional:  Negative for activity change, appetite change, chills, diaphoresis, fatigue and fever.    HENT:  Negative for congestion, ear discharge, ear pain, facial swelling, hearing loss, sinus pressure, sinus pain, sneezing, sore throat, tinnitus, trouble swallowing and

## 2023-05-03 ENCOUNTER — HOSPITAL ENCOUNTER (OUTPATIENT)
Facility: HOSPITAL | Age: 53
Setting detail: SPECIMEN
Discharge: HOME | End: 2023-05-06
Payer: COMMERCIAL

## 2023-05-03 DIAGNOSIS — R63.4 WEIGHT LOSS, NON-INTENTIONAL: ICD-10-CM

## 2023-05-03 LAB
T4 FREE SERPL-MCNC: 1 NG/DL (ref 0.7–1.5)
TSH SERPL DL<=0.05 MIU/L-ACNC: 2.64 UIU/ML (ref 0.36–3.74)

## 2023-05-03 PROCEDURE — 84443 ASSAY THYROID STIM HORMONE: CPT

## 2023-05-03 PROCEDURE — 36415 COLL VENOUS BLD VENIPUNCTURE: CPT

## 2023-05-03 PROCEDURE — 84439 ASSAY OF FREE THYROXINE: CPT

## 2023-05-05 LAB
AMPHETAMINES UR QL SCN: NEGATIVE NG/ML
BARBITURATES UR QL: NEGATIVE NG/ML
BENZODIAZ UR QL: NEGATIVE NG/ML
BZE UR QL: NEGATIVE NG/ML
CANNABINOIDS UR QL SCN: NEGATIVE NG/ML
CREAT UR-MCNC: 184.1 MG/DL (ref 20–300)
ETHANOL UR-MCNC: NEGATIVE %
MEPERIDINE UR QL: NEGATIVE NG/ML
METHADONE UR QL: NEGATIVE NG/ML
OPIATES UR QL SCN: NEGATIVE NG/ML
OXYCODONE+OXYMORPHONE UR QL SCN: NEGATIVE NG/ML
PCP UR QL: NEGATIVE NG/ML
PROPOXYPH UR QL: NEGATIVE NG/ML
TRAMADOL UR QL SCN: NEGATIVE NG/ML

## 2023-07-20 ENCOUNTER — OFFICE VISIT (OUTPATIENT)
Dept: FAMILY MEDICINE CLINIC | Facility: CLINIC | Age: 53
End: 2023-07-20
Payer: MEDICARE

## 2023-07-20 ENCOUNTER — HOSPITAL ENCOUNTER (OUTPATIENT)
Facility: HOSPITAL | Age: 53
Setting detail: SPECIMEN
Discharge: HOME | End: 2023-07-20
Payer: MEDICARE

## 2023-07-20 VITALS
BODY MASS INDEX: 22.15 KG/M2 | OXYGEN SATURATION: 96 % | DIASTOLIC BLOOD PRESSURE: 68 MMHG | WEIGHT: 158.2 LBS | RESPIRATION RATE: 15 BRPM | SYSTOLIC BLOOD PRESSURE: 104 MMHG | TEMPERATURE: 97.9 F | HEART RATE: 98 BPM | HEIGHT: 71 IN

## 2023-07-20 DIAGNOSIS — R35.0 URINE FREQUENCY: ICD-10-CM

## 2023-07-20 DIAGNOSIS — G80.9 CEREBRAL PALSY, UNSPECIFIED TYPE (HCC): ICD-10-CM

## 2023-07-20 DIAGNOSIS — Z01.818 PRE-OP EVALUATION: ICD-10-CM

## 2023-07-20 DIAGNOSIS — Z87.820 H/O TRAUMATIC BRAIN INJURY: ICD-10-CM

## 2023-07-20 DIAGNOSIS — C61 MALIGNANT NEOPLASM OF PROSTATE (HCC): ICD-10-CM

## 2023-07-20 DIAGNOSIS — Z01.818 PRE-OP EVALUATION: Primary | ICD-10-CM

## 2023-07-20 LAB
ANION GAP SERPL CALC-SCNC: 1 MMOL/L (ref 3–18)
APPEARANCE UR: CLEAR
BASOPHILS # BLD: 0 K/UL (ref 0–0.1)
BASOPHILS NFR BLD: 0 % (ref 0–2)
BILIRUB UR QL: NEGATIVE
BUN SERPL-MCNC: 15 MG/DL (ref 7–18)
BUN/CREAT SERPL: 16 (ref 12–20)
CALCIUM SERPL-MCNC: 8.8 MG/DL (ref 8.5–10.1)
CHLORIDE SERPL-SCNC: 105 MMOL/L (ref 100–111)
CO2 SERPL-SCNC: 32 MMOL/L (ref 21–32)
COLOR UR: YELLOW
CREAT SERPL-MCNC: 0.94 MG/DL (ref 0.6–1.3)
DIFFERENTIAL METHOD BLD: ABNORMAL
EOSINOPHIL # BLD: 0 K/UL (ref 0–0.4)
EOSINOPHIL NFR BLD: 1 % (ref 0–5)
ERYTHROCYTE [DISTWIDTH] IN BLOOD BY AUTOMATED COUNT: 13.2 % (ref 11.6–14.5)
GLUCOSE SERPL-MCNC: 116 MG/DL (ref 74–99)
GLUCOSE UR STRIP.AUTO-MCNC: NEGATIVE MG/DL
HCT VFR BLD AUTO: 39.5 % (ref 36–48)
HGB BLD-MCNC: 13.1 G/DL (ref 13–16)
HGB UR QL STRIP: NEGATIVE
IMM GRANULOCYTES # BLD AUTO: 0 K/UL (ref 0–0.04)
IMM GRANULOCYTES NFR BLD AUTO: 0 % (ref 0–0.5)
KETONES UR QL STRIP.AUTO: NEGATIVE MG/DL
LEUKOCYTE ESTERASE UR QL STRIP.AUTO: NEGATIVE
LYMPHOCYTES # BLD: 1.7 K/UL (ref 0.9–3.6)
LYMPHOCYTES NFR BLD: 23 % (ref 21–52)
MCH RBC QN AUTO: 31.3 PG (ref 24–34)
MCHC RBC AUTO-ENTMCNC: 33.2 G/DL (ref 31–37)
MCV RBC AUTO: 94.5 FL (ref 78–100)
MONOCYTES # BLD: 0.6 K/UL (ref 0.05–1.2)
MONOCYTES NFR BLD: 8 % (ref 3–10)
NEUTS SEG # BLD: 5 K/UL (ref 1.8–8)
NEUTS SEG NFR BLD: 68 % (ref 40–73)
NITRITE UR QL STRIP.AUTO: NEGATIVE
NRBC # BLD: 0 K/UL (ref 0–0.01)
NRBC BLD-RTO: 0 PER 100 WBC
PH UR STRIP: 6.5 (ref 5–8)
PLATELET # BLD AUTO: 262 K/UL (ref 135–420)
PMV BLD AUTO: 9.3 FL (ref 9.2–11.8)
POTASSIUM SERPL-SCNC: 4.1 MMOL/L (ref 3.5–5.5)
PROT UR STRIP-MCNC: NEGATIVE MG/DL
RBC # BLD AUTO: 4.18 M/UL (ref 4.35–5.65)
SODIUM SERPL-SCNC: 138 MMOL/L (ref 136–145)
SP GR UR REFRACTOMETRY: 1.02 (ref 1–1.03)
UROBILINOGEN UR QL STRIP.AUTO: 0.2 EU/DL (ref 0.2–1)
WBC # BLD AUTO: 7.3 K/UL (ref 4.6–13.2)

## 2023-07-20 PROCEDURE — 3078F DIAST BP <80 MM HG: CPT | Performed by: LEGAL MEDICINE

## 2023-07-20 PROCEDURE — G8420 CALC BMI NORM PARAMETERS: HCPCS | Performed by: LEGAL MEDICINE

## 2023-07-20 PROCEDURE — G8427 DOCREV CUR MEDS BY ELIG CLIN: HCPCS | Performed by: LEGAL MEDICINE

## 2023-07-20 PROCEDURE — 36415 COLL VENOUS BLD VENIPUNCTURE: CPT

## 2023-07-20 PROCEDURE — 85025 COMPLETE CBC W/AUTO DIFF WBC: CPT

## 2023-07-20 PROCEDURE — 1036F TOBACCO NON-USER: CPT | Performed by: LEGAL MEDICINE

## 2023-07-20 PROCEDURE — 87086 URINE CULTURE/COLONY COUNT: CPT

## 2023-07-20 PROCEDURE — 99214 OFFICE O/P EST MOD 30 MIN: CPT | Performed by: LEGAL MEDICINE

## 2023-07-20 PROCEDURE — 81003 URINALYSIS AUTO W/O SCOPE: CPT

## 2023-07-20 PROCEDURE — 80048 BASIC METABOLIC PNL TOTAL CA: CPT

## 2023-07-20 PROCEDURE — 3074F SYST BP LT 130 MM HG: CPT | Performed by: LEGAL MEDICINE

## 2023-07-20 PROCEDURE — 3017F COLORECTAL CA SCREEN DOC REV: CPT | Performed by: LEGAL MEDICINE

## 2023-07-20 RX ORDER — POLYETHYLENE GLYCOL 3350 17 G/17G
POWDER, FOR SOLUTION ORAL
COMMUNITY
Start: 2023-06-08

## 2023-07-20 ASSESSMENT — ENCOUNTER SYMPTOMS
DIARRHEA: 0
WHEEZING: 0
FACIAL SWELLING: 0
EYE REDNESS: 0
NAUSEA: 0
EYE ITCHING: 0
VOMITING: 0
APNEA: 0
SORE THROAT: 0
ABDOMINAL PAIN: 0
BLOOD IN STOOL: 0
COUGH: 0
SHORTNESS OF BREATH: 0
CHEST TIGHTNESS: 0
CONSTIPATION: 0
BACK PAIN: 0
ANAL BLEEDING: 0
EYE DISCHARGE: 0
CHOKING: 0
EYE PAIN: 0

## 2023-07-21 LAB
BACTERIA SPEC CULT: NORMAL
CC UR VC: NORMAL
SERVICE CMNT-IMP: NORMAL

## 2023-07-24 NOTE — PROGRESS NOTES
Patent  weight to be done once a week   Ok to hold ensure the morning of the surgery 26/July /2023  Also to hold multivitamins  on 25 and 26 /July 2023

## 2023-08-24 ENCOUNTER — TELEPHONE (OUTPATIENT)
Dept: FAMILY MEDICINE CLINIC | Facility: CLINIC | Age: 53
End: 2023-08-24

## 2023-08-24 NOTE — TELEPHONE ENCOUNTER
----- Message from Pedro Hill sent at 8/22/2023 10:05 AM EDT -----  Subject: Appointment Request    Reason for Call: New Patient/New to Provider Appointment needed: New   Patient Request Appointment    QUESTIONS    Reason for appointment request? No appointments available during search     Additional Information for Provider? New Pt/Provider, Former Pt of [employer] [employer], who has left practice and Pt would like to est new care with any   PCP in this [facility] that is accepting Now NP's or stanton taken on Dr. Chito Mendez   Pt's. Please contact pt to schedule NP appt.  Please contact Ellis Pratt @ [phone]  ---------------------------------------------------------------------------  --------------  Celia ZAMAN  [phone]; OK to leave message on voicemail,Do not leave any message,   patient will call back for answer,OK to respond with electronic message   via [employer] portal (only for patients who have registered [employer] account)  ---------------------------------------------------------------------------  --------------  SCRIPT ANSWERS

## 2023-08-24 NOTE — TELEPHONE ENCOUNTER
Lvm informing pt of PCP's future departure from the practice as well as options for getting a new PCP.

## 2023-09-07 DIAGNOSIS — R63.4 WEIGHT LOSS, NON-INTENTIONAL: ICD-10-CM

## 2023-09-27 NOTE — TELEPHONE ENCOUNTER
(Gema) called on behalf of the pt stating that he is scheduled to see new pcp on the 23 th of next month and pt is needing a refill on medications. Mrs Bobby Barrios would like to know if the pt could get a 1 month supply until he is able to see the new provider. Bobby Barrios would like the scripts either faxed to her so that she could sent to the pharmacy or an updated copy of the script faxed to her at [phone].  Please advise

## 2023-09-28 RX ORDER — FLUTICASONE PROPIONATE 50 MCG
2 SPRAY, SUSPENSION (ML) NASAL DAILY
Qty: 16 G | Refills: 0 | Status: SHIPPED | OUTPATIENT
Start: 2023-09-28

## 2023-09-28 RX ORDER — FOLIC ACID/MV,IRON,MIN/LUTEIN 0.4-18-25
1 TABLET ORAL DAILY
Qty: 30 TABLET | Refills: 0 | Status: SHIPPED | OUTPATIENT
Start: 2023-09-28

## 2023-10-17 ENCOUNTER — TELEPHONE (OUTPATIENT)
Dept: FAMILY MEDICINE CLINIC | Facility: CLINIC | Age: 53
End: 2023-10-17

## 2023-10-17 NOTE — TELEPHONE ENCOUNTER
Rep from facility called in regards to pt. States pt went to ER and was given medication for nausea. Rep stated that pt was not complaining of nausea and didn't need to be on medication. Requested that there be a change on script. Advised rep to take pt back to ER due to our office not prescribing medication and [facility] is fully booked.

## 2023-11-08 RX ORDER — FOLIC ACID/MV,IRON,MIN/LUTEIN 0.4-18-25
1 TABLET ORAL DAILY
Qty: 30 TABLET | Refills: 5 | Status: SHIPPED | OUTPATIENT
Start: 2023-11-08

## 2023-11-08 NOTE — TELEPHONE ENCOUNTER
Received fax from [facility] for refill of:    Certavite tabs; qty 30 w/refills

## 2023-12-29 DIAGNOSIS — R63.4 WEIGHT LOSS, NON-INTENTIONAL: ICD-10-CM

## 2023-12-29 RX ORDER — LACTOSE-REDUCED FOOD
1 LIQUID (ML) ORAL 2 TIMES DAILY
Qty: 60 EACH | Refills: 5 | Status: SHIPPED | OUTPATIENT
Start: 2023-12-29 | End: 2024-01-28

## 2023-12-29 NOTE — TELEPHONE ENCOUNTER
[address] called requesting a refill to be sent. Message sent to the clinical staff to further assist. Called pt and LVM to get scheduled for a [facility] appt.     Requested Prescriptions     Pending Prescriptions Disp Refills    Nutritional Supplements (ENSURE ORIGINAL) LIQD 60 each 5     Sig: Take 1 Bottle by mouth in the morning and at bedtime

## 2024-01-04 RX ORDER — FLUTICASONE PROPIONATE 50 MCG
2 SPRAY, SUSPENSION (ML) NASAL DAILY
Qty: 16 G | Refills: 0 | Status: SHIPPED | OUTPATIENT
Start: 2024-01-04

## 2024-01-04 NOTE — TELEPHONE ENCOUNTER
Last visit: 7/27/23  Next visit:   Future Appointments   Date Time Provider Department Center   2/9/2024 10:45 AM Santos Carter MD [facility] [place] Sched     Last filled: 9/28/23; flonase spray; qty 16 g w/no refills

## 2024-04-04 NOTE — PROGRESS NOTES
Bilateral wax removal was done by the nurse Lai Trammell by irrigation
bilateral ear lavage/irrigation completed to remove impacted cerumen. Patient tolerated well and has no questions at this time.
cerumen
None

## 2024-05-01 RX ORDER — FOLIC ACID/MV,IRON,MIN/LUTEIN 0.4-18-25
1 TABLET ORAL DAILY
Qty: 28 TABLET | Refills: 0 | OUTPATIENT
Start: 2024-05-01

## 2024-05-08 RX ORDER — FOLIC ACID/MV,IRON,MIN/LUTEIN 0.4-18-25
1 TABLET ORAL DAILY
Qty: 28 TABLET | Refills: 0 | OUTPATIENT
Start: 2024-05-08